# Patient Record
Sex: MALE | Race: BLACK OR AFRICAN AMERICAN | NOT HISPANIC OR LATINO | URBAN - METROPOLITAN AREA
[De-identification: names, ages, dates, MRNs, and addresses within clinical notes are randomized per-mention and may not be internally consistent; named-entity substitution may affect disease eponyms.]

---

## 2021-06-06 ENCOUNTER — EMERGENCY (EMERGENCY)
Facility: HOSPITAL | Age: 51
LOS: 1 days | Discharge: HOME | End: 2021-06-06
Attending: EMERGENCY MEDICINE | Admitting: EMERGENCY MEDICINE
Payer: COMMERCIAL

## 2021-06-06 VITALS
HEIGHT: 67 IN | OXYGEN SATURATION: 100 % | SYSTOLIC BLOOD PRESSURE: 124 MMHG | DIASTOLIC BLOOD PRESSURE: 85 MMHG | RESPIRATION RATE: 18 BRPM | HEART RATE: 105 BPM | TEMPERATURE: 99 F

## 2021-06-06 VITALS
RESPIRATION RATE: 17 BRPM | TEMPERATURE: 98 F | HEART RATE: 92 BPM | OXYGEN SATURATION: 100 % | DIASTOLIC BLOOD PRESSURE: 84 MMHG | SYSTOLIC BLOOD PRESSURE: 122 MMHG

## 2021-06-06 LAB
ALBUMIN SERPL ELPH-MCNC: 4 G/DL — SIGNIFICANT CHANGE UP (ref 3.3–5)
ALP SERPL-CCNC: 89 U/L — SIGNIFICANT CHANGE UP (ref 40–120)
ALT FLD-CCNC: 16 U/L — SIGNIFICANT CHANGE UP (ref 4–41)
ANION GAP SERPL CALC-SCNC: 11 MMOL/L — SIGNIFICANT CHANGE UP (ref 7–14)
AST SERPL-CCNC: 11 U/L — SIGNIFICANT CHANGE UP (ref 4–40)
BASOPHILS # BLD AUTO: 0.04 K/UL — SIGNIFICANT CHANGE UP (ref 0–0.2)
BASOPHILS NFR BLD AUTO: 0.4 % — SIGNIFICANT CHANGE UP (ref 0–2)
BILIRUB SERPL-MCNC: 0.8 MG/DL — SIGNIFICANT CHANGE UP (ref 0.2–1.2)
BLOOD GAS VENOUS COMPREHENSIVE RESULT: SIGNIFICANT CHANGE UP
BUN SERPL-MCNC: 15 MG/DL — SIGNIFICANT CHANGE UP (ref 7–23)
CALCIUM SERPL-MCNC: 9 MG/DL — SIGNIFICANT CHANGE UP (ref 8.4–10.5)
CHLORIDE SERPL-SCNC: 101 MMOL/L — SIGNIFICANT CHANGE UP (ref 98–107)
CO2 SERPL-SCNC: 26 MMOL/L — SIGNIFICANT CHANGE UP (ref 22–31)
CREAT SERPL-MCNC: 0.91 MG/DL — SIGNIFICANT CHANGE UP (ref 0.5–1.3)
EOSINOPHIL # BLD AUTO: 0.04 K/UL — SIGNIFICANT CHANGE UP (ref 0–0.5)
EOSINOPHIL NFR BLD AUTO: 0.4 % — SIGNIFICANT CHANGE UP (ref 0–6)
GLUCOSE SERPL-MCNC: 175 MG/DL — HIGH (ref 70–99)
HCT VFR BLD CALC: 40.1 % — SIGNIFICANT CHANGE UP (ref 39–50)
HGB BLD-MCNC: 12.7 G/DL — LOW (ref 13–17)
IANC: 6.15 K/UL — SIGNIFICANT CHANGE UP (ref 1.5–8.5)
IMM GRANULOCYTES NFR BLD AUTO: 0.3 % — SIGNIFICANT CHANGE UP (ref 0–1.5)
LYMPHOCYTES # BLD AUTO: 2.55 K/UL — SIGNIFICANT CHANGE UP (ref 1–3.3)
LYMPHOCYTES # BLD AUTO: 26.6 % — SIGNIFICANT CHANGE UP (ref 13–44)
MCHC RBC-ENTMCNC: 28 PG — SIGNIFICANT CHANGE UP (ref 27–34)
MCHC RBC-ENTMCNC: 31.7 GM/DL — LOW (ref 32–36)
MCV RBC AUTO: 88.3 FL — SIGNIFICANT CHANGE UP (ref 80–100)
MONOCYTES # BLD AUTO: 0.79 K/UL — SIGNIFICANT CHANGE UP (ref 0–0.9)
MONOCYTES NFR BLD AUTO: 8.2 % — SIGNIFICANT CHANGE UP (ref 2–14)
NEUTROPHILS # BLD AUTO: 6.15 K/UL — SIGNIFICANT CHANGE UP (ref 1.8–7.4)
NEUTROPHILS NFR BLD AUTO: 64.1 % — SIGNIFICANT CHANGE UP (ref 43–77)
NRBC # BLD: 0 /100 WBCS — SIGNIFICANT CHANGE UP
NRBC # FLD: 0 K/UL — SIGNIFICANT CHANGE UP
PLATELET # BLD AUTO: 206 K/UL — SIGNIFICANT CHANGE UP (ref 150–400)
POTASSIUM SERPL-MCNC: 3.9 MMOL/L — SIGNIFICANT CHANGE UP (ref 3.5–5.3)
POTASSIUM SERPL-SCNC: 3.9 MMOL/L — SIGNIFICANT CHANGE UP (ref 3.5–5.3)
PROT SERPL-MCNC: 7.6 G/DL — SIGNIFICANT CHANGE UP (ref 6–8.3)
RBC # BLD: 4.54 M/UL — SIGNIFICANT CHANGE UP (ref 4.2–5.8)
RBC # FLD: 12 % — SIGNIFICANT CHANGE UP (ref 10.3–14.5)
SODIUM SERPL-SCNC: 138 MMOL/L — SIGNIFICANT CHANGE UP (ref 135–145)
WBC # BLD: 9.6 K/UL — SIGNIFICANT CHANGE UP (ref 3.8–10.5)
WBC # FLD AUTO: 9.6 K/UL — SIGNIFICANT CHANGE UP (ref 3.8–10.5)

## 2021-06-06 PROCEDURE — 73630 X-RAY EXAM OF FOOT: CPT | Mod: 26,LT

## 2021-06-06 PROCEDURE — 99284 EMERGENCY DEPT VISIT MOD MDM: CPT

## 2021-06-06 PROCEDURE — 73610 X-RAY EXAM OF ANKLE: CPT | Mod: 26,LT

## 2021-06-06 RX ADMIN — Medication 100 MILLIGRAM(S): at 17:28

## 2021-06-06 NOTE — ED ADULT NURSE NOTE - OBJECTIVE STATEMENT
Pt AOX4 c/o wound to dorsal aspect of Left foot; 4x4 cm round open wound noted, wound bed is red, granulation noted; no pus noted; pt denies pain; states he had a pedicure 2 days ago and yesterday a blister appeared and grew and when it popped today the wound became visible.

## 2021-06-06 NOTE — ED PROVIDER NOTE - NSFOLLOWUPINSTRUCTIONS_ED_ALL_ED_FT
Follow up with your primary care provider within 24 hours    Follow up with Podiatry and Dermatology- referral list given      Worsening, continued or new concerning symptoms, fever, redness, discharge, return to the emergency department.

## 2021-06-06 NOTE — ED PROVIDER NOTE - SKIN WOUND TYPE
small blister noted on anterior ankle. large 1st degree ulcer on dorsal foot, no discharge, no erythema, no streaking, no crepitus. left foot with mild edema

## 2021-06-06 NOTE — ED PROVIDER NOTE - PATIENT PORTAL LINK FT
You can access the FollowMyHealth Patient Portal offered by [facility] by registering at the following website: [email or web address]. By joining [employer]’s FollowMyHealth portal, you will also be able to view your health information using other applications (apps) compatible with our system.

## 2021-06-06 NOTE — ED ADULT TRIAGE NOTE - CCCP TRG CHIEF CMPLNT
left foot wound had a pedicure and afterwards pt had blistering next morning pt denies any injury/wound check

## 2021-06-06 NOTE — ED PROVIDER NOTE - CLINICAL SUMMARY MEDICAL DECISION MAKING FREE TEXT BOX
52 y/o male with pmhx of DM not on medication making lifestyle changes, presents to ED c/o left foot blistering x 1 week. Pt states he got a pedicure 1 week ago, and next day developed two blisters on top of his foot and ankle. Pt states a large blister on his foot formed and popped on its own. Pt states he has been keeping the wound clean. Pt denies any pain or history of neuropathy. Notes some mild foot swelling. No weakness, numbness, tingling, calf pain, fevers, chills, redness. on exam, small blister noted on anterior ankle. large 1st degree ulcer on dorsal foot, no discharge, no erythema, no streaking, no crepitus. left foot with mild edema. given swelling and diabetes hx will treat for possible cellulitis with doxycycline, check labs, foot and ankle XR. will refer to podiatry and dermatology outpatient

## 2021-06-06 NOTE — ED PROVIDER NOTE - PROGRESS NOTE DETAILS
PA Gerasimou- pt afebrile. no wbc count. pt amenable for dc home on doxycyline JONA Salehu- pt afebrile. no wbc count. pt amenable for dc home on doxycyline. placed wound dressing with bacitracin

## 2021-06-06 NOTE — ED PROVIDER NOTE - ATTENDING CONTRIBUTION TO CARE
51M had pedicure 1 week ago, then L foot 2 large blisters, one large on top of foot then one on ankle.  Yellow fluid.  Does not appear infected, no foul discharge.  Foot is swollen.  No fever or chills.  Oral temp 99 mild tachycardia.  Plan labs xray.  Does not have pain.    Has good sensation and pulses.  Unusual presentation but pt does not appear acutely ill.  Given uncontrolled DM would check labs.  Given swelling would check xray r/o FB.  Unclear source of blistering, given overall benign appearance and no crepitus or rapid progression do not suspect nec fasc.  However given swelling and DM would rx course of abx in case of occult infection.  Pt should f/u with derm, rx abx and metformin given glc of 175 on labs.  Pt advised to stay out of the sun while on doxy.  VS:  unremarkable except LG temp, mild tachycardia improving    GEN - NAD;   well appearing;   A+O x3   HEAD - NC/AT     ENT - PEERL, EOMI, mucous membranes    moist , no discharge      NECK: Neck supple, non-tender without lymphadenopathy, no masses, no JVD  PULM - CTA b/l,  symmetric breath sounds  COR -  normal heart sounds    ABD - , ND, NT, soft,  BACK - no CVA tenderness, nontender spine     EXTREMS - no edema, no deformity, warm and well perfused  except mild swelling of foot diffusely.   SKIN - no rash    or bruising    Except large ulceration (deroofed blister) to dorsum of L foot.  Base of ulcer has good granulation tissue and no purulence or crepitus.  Rim of ulcer is clean. Scattered tiny ulcers to anterior aspect of ankle, nonpurulent.  Minimal ttp of foot.   NEUROLOGIC - alert, face symmetric, speech fluent, sensation nl, motor no focal deficit.

## 2021-06-06 NOTE — ED ADULT NURSE NOTE - NSIMPLEMENTINTERV_GEN_ALL_ED
Implemented All Universal Safety Interventions:  [place] to call system. Call bell, personal items and telephone within reach. Instruct patient to call for assistance. Room bathroom lighting operational. Non-slip footwear when patient is off stretcher. Physically safe environment: no spills, clutter or unnecessary equipment. Stretcher in lowest position, wheels locked, appropriate side rails in place.

## 2021-06-06 NOTE — ED PROVIDER NOTE - OBJECTIVE STATEMENT
50 y/o male with pmhx of DM not on medication making lifestyle changes, presents to ED c/o left foot blistering x 1 week. Pt states he got a pedicure 1 week ago, and next day developed two blisters on top of his foot and ankle. Pt states a large blister on his foot formed and popped on its own. Pt states he has been keeping the wound clean. Pt denies any pain or history of neuropathy. Notes some mild foot swelling. No weakness, numbness, tingling, calf pain, fevers, chills, redness.

## 2021-06-07 RX ORDER — METFORMIN HYDROCHLORIDE 850 MG/1
1 TABLET ORAL
Qty: 60 | Refills: 0
Start: 2021-06-07 | End: 2021-07-06

## 2021-06-09 PROBLEM — Z00.00 ENCOUNTER FOR PREVENTIVE HEALTH EXAMINATION: Status: ACTIVE | Noted: 2021-06-09

## 2021-06-11 ENCOUNTER — APPOINTMENT (OUTPATIENT)
Dept: WOUND CARE | Facility: CLINIC | Age: 51
End: 2021-06-11
Payer: COMMERCIAL

## 2021-06-11 VITALS
TEMPERATURE: 96.8 F | HEART RATE: 90 BPM | RESPIRATION RATE: 16 BRPM | DIASTOLIC BLOOD PRESSURE: 95 MMHG | SYSTOLIC BLOOD PRESSURE: 144 MMHG

## 2021-06-11 DIAGNOSIS — E10.49 TYPE 1 DIABETES MELLITUS WITH OTHER DIABETIC NEUROLOGICAL COMPLICATION: ICD-10-CM

## 2021-06-11 DIAGNOSIS — L97.422 NON-PRESSURE CHRONIC ULCER OF LEFT HEEL AND MIDFOOT WITH FAT LAYER EXPOSED: ICD-10-CM

## 2021-06-11 PROBLEM — E11.9 TYPE 2 DIABETES MELLITUS WITHOUT COMPLICATIONS: Chronic | Status: ACTIVE | Noted: 2021-06-06

## 2021-06-11 PROCEDURE — 99203 OFFICE O/P NEW LOW 30 MIN: CPT | Mod: 25

## 2021-06-11 PROCEDURE — 99072 ADDL SUPL MATRL&STAF TM PHE: CPT

## 2021-06-11 PROCEDURE — 11042 DBRDMT SUBQ TIS 1ST 20SQCM/<: CPT

## 2021-06-11 RX ORDER — MUPIROCIN 20 MG/G
2 OINTMENT TOPICAL
Qty: 1 | Refills: 2 | Status: ACTIVE | COMMUNITY
Start: 2021-06-11 | End: 1900-01-01

## 2021-06-11 NOTE — PLAN
[FreeTextEntry1] : Full thickness debridement of ulceration left foot with sterile # 10 blade down tot he level of the subcutaneous tissue but not past, removal of fibrotic tissue dorsal left foot\par Rx mupirocin ointment with gauze and oswaldo\par recommend patient use surgical shoe left foot\par recommend patient stay off work for 2 weeks\par no need for oral antibiosis\par return 2 weeks or PRN

## 2021-06-11 NOTE — HISTORY OF PRESENT ILLNESS
[FreeTextEntry1] : 51 years old male [presents to wound center for evaluation of ulcer dorsal left foot\par patietn went to Pedicure 2 weeks ago and noticed blisters left foot. superficial wound dorsal 2,3,4 metatarsals with no signs of drainage no edema no ertythema and no cellulitis\par DP and PT palapble pedal pulses\par Patient has diabetic neuropathy with absent protective threshold bothf eet\par Possible burn, rule out blister\par no need for oral antibiosis\par

## 2021-06-25 ENCOUNTER — APPOINTMENT (OUTPATIENT)
Dept: WOUND CARE | Facility: CLINIC | Age: 51
End: 2021-06-25

## 2021-07-07 ENCOUNTER — APPOINTMENT (OUTPATIENT)
Dept: DERMATOLOGY | Facility: CLINIC | Age: 51
End: 2021-07-07

## 2022-08-29 ENCOUNTER — EMERGENCY (EMERGENCY)
Facility: HOSPITAL | Age: 52
LOS: 1 days | Discharge: HOME | End: 2022-08-29
Attending: STUDENT IN AN ORGANIZED HEALTH CARE EDUCATION/TRAINING PROGRAM | Admitting: STUDENT IN AN ORGANIZED HEALTH CARE EDUCATION/TRAINING PROGRAM

## 2022-08-29 VITALS
RESPIRATION RATE: 18 BRPM | SYSTOLIC BLOOD PRESSURE: 109 MMHG | HEART RATE: 119 BPM | OXYGEN SATURATION: 98 % | DIASTOLIC BLOOD PRESSURE: 68 MMHG | TEMPERATURE: 98 F | HEIGHT: 67 IN

## 2022-08-29 VITALS
RESPIRATION RATE: 19 BRPM | SYSTOLIC BLOOD PRESSURE: 153 MMHG | DIASTOLIC BLOOD PRESSURE: 90 MMHG | TEMPERATURE: 98 F | OXYGEN SATURATION: 100 % | HEART RATE: 92 BPM

## 2022-08-29 LAB
ALBUMIN SERPL ELPH-MCNC: 3.7 G/DL — SIGNIFICANT CHANGE UP (ref 3.3–5)
ALP SERPL-CCNC: 73 U/L — SIGNIFICANT CHANGE UP (ref 40–120)
ALT FLD-CCNC: 21 U/L — SIGNIFICANT CHANGE UP (ref 4–41)
ANION GAP SERPL CALC-SCNC: 10 MMOL/L — SIGNIFICANT CHANGE UP (ref 7–14)
AST SERPL-CCNC: 27 U/L — SIGNIFICANT CHANGE UP (ref 4–40)
BASOPHILS # BLD AUTO: 0.04 K/UL — SIGNIFICANT CHANGE UP (ref 0–0.2)
BASOPHILS NFR BLD AUTO: 0.6 % — SIGNIFICANT CHANGE UP (ref 0–2)
BILIRUB SERPL-MCNC: 2 MG/DL — HIGH (ref 0.2–1.2)
BLOOD GAS VENOUS COMPREHENSIVE RESULT: SIGNIFICANT CHANGE UP
BUN SERPL-MCNC: 13 MG/DL — SIGNIFICANT CHANGE UP (ref 7–23)
CALCIUM SERPL-MCNC: 8.3 MG/DL — LOW (ref 8.4–10.5)
CHLORIDE SERPL-SCNC: 92 MMOL/L — LOW (ref 98–107)
CO2 SERPL-SCNC: 25 MMOL/L — SIGNIFICANT CHANGE UP (ref 22–31)
CREAT SERPL-MCNC: 1.02 MG/DL — SIGNIFICANT CHANGE UP (ref 0.5–1.3)
EGFR: 88 ML/MIN/1.73M2 — SIGNIFICANT CHANGE UP
EOSINOPHIL # BLD AUTO: 0 K/UL — SIGNIFICANT CHANGE UP (ref 0–0.5)
EOSINOPHIL NFR BLD AUTO: 0 % — SIGNIFICANT CHANGE UP (ref 0–6)
GLUCOSE SERPL-MCNC: 161 MG/DL — HIGH (ref 70–99)
HCT VFR BLD CALC: 40.1 % — SIGNIFICANT CHANGE UP (ref 39–50)
HGB BLD-MCNC: 13.5 G/DL — SIGNIFICANT CHANGE UP (ref 13–17)
IANC: 4.77 K/UL — SIGNIFICANT CHANGE UP (ref 1.8–7.4)
IMM GRANULOCYTES NFR BLD AUTO: 0.8 % — SIGNIFICANT CHANGE UP (ref 0–1.5)
LIDOCAIN IGE QN: 36 U/L — SIGNIFICANT CHANGE UP (ref 7–60)
LYMPHOCYTES # BLD AUTO: 1.35 K/UL — SIGNIFICANT CHANGE UP (ref 1–3.3)
LYMPHOCYTES # BLD AUTO: 20.3 % — SIGNIFICANT CHANGE UP (ref 13–44)
MAGNESIUM SERPL-MCNC: 1.9 MG/DL — SIGNIFICANT CHANGE UP (ref 1.6–2.6)
MCHC RBC-ENTMCNC: 28.1 PG — SIGNIFICANT CHANGE UP (ref 27–34)
MCHC RBC-ENTMCNC: 33.7 GM/DL — SIGNIFICANT CHANGE UP (ref 32–36)
MCV RBC AUTO: 83.4 FL — SIGNIFICANT CHANGE UP (ref 80–100)
MONOCYTES # BLD AUTO: 0.45 K/UL — SIGNIFICANT CHANGE UP (ref 0–0.9)
MONOCYTES NFR BLD AUTO: 6.8 % — SIGNIFICANT CHANGE UP (ref 2–14)
NEUTROPHILS # BLD AUTO: 4.77 K/UL — SIGNIFICANT CHANGE UP (ref 1.8–7.4)
NEUTROPHILS NFR BLD AUTO: 71.5 % — SIGNIFICANT CHANGE UP (ref 43–77)
NRBC # BLD: 0 /100 WBCS — SIGNIFICANT CHANGE UP (ref 0–0)
NRBC # FLD: 0 K/UL — SIGNIFICANT CHANGE UP (ref 0–0)
PHOSPHATE SERPL-MCNC: 2.6 MG/DL — SIGNIFICANT CHANGE UP (ref 2.5–4.5)
PLATELET # BLD AUTO: 158 K/UL — SIGNIFICANT CHANGE UP (ref 150–400)
POTASSIUM SERPL-MCNC: 3.8 MMOL/L — SIGNIFICANT CHANGE UP (ref 3.5–5.3)
POTASSIUM SERPL-SCNC: 3.8 MMOL/L — SIGNIFICANT CHANGE UP (ref 3.5–5.3)
PROT SERPL-MCNC: 7.5 G/DL — SIGNIFICANT CHANGE UP (ref 6–8.3)
RBC # BLD: 4.81 M/UL — SIGNIFICANT CHANGE UP (ref 4.2–5.8)
RBC # FLD: 12.2 % — SIGNIFICANT CHANGE UP (ref 10.3–14.5)
SODIUM SERPL-SCNC: 127 MMOL/L — LOW (ref 135–145)
TROPONIN T, HIGH SENSITIVITY RESULT: 19 NG/L — SIGNIFICANT CHANGE UP
WBC # BLD: 6.66 K/UL — SIGNIFICANT CHANGE UP (ref 3.8–10.5)
WBC # FLD AUTO: 6.66 K/UL — SIGNIFICANT CHANGE UP (ref 3.8–10.5)

## 2022-08-29 PROCEDURE — 99285 EMERGENCY DEPT VISIT HI MDM: CPT

## 2022-08-29 PROCEDURE — 71045 X-RAY EXAM CHEST 1 VIEW: CPT | Mod: 26

## 2022-08-29 RX ORDER — FAMOTIDINE 10 MG/ML
1 INJECTION INTRAVENOUS
Qty: 20 | Refills: 0
Start: 2022-08-29 | End: 2022-09-07

## 2022-08-29 RX ORDER — SODIUM CHLORIDE 9 MG/ML
1000 INJECTION INTRAMUSCULAR; INTRAVENOUS; SUBCUTANEOUS ONCE
Refills: 0 | Status: COMPLETED | OUTPATIENT
Start: 2022-08-29 | End: 2022-08-29

## 2022-08-29 RX ORDER — FAMOTIDINE 10 MG/ML
20 INJECTION INTRAVENOUS ONCE
Refills: 0 | Status: DISCONTINUED | OUTPATIENT
Start: 2022-08-29 | End: 2022-08-29

## 2022-08-29 RX ORDER — FAMOTIDINE 10 MG/ML
20 INJECTION INTRAVENOUS ONCE
Refills: 0 | Status: COMPLETED | OUTPATIENT
Start: 2022-08-29 | End: 2022-08-29

## 2022-08-29 RX ADMIN — SODIUM CHLORIDE 1000 MILLILITER(S): 9 INJECTION INTRAMUSCULAR; INTRAVENOUS; SUBCUTANEOUS at 14:15

## 2022-08-29 RX ADMIN — FAMOTIDINE 20 MILLIGRAM(S): 10 INJECTION INTRAVENOUS at 16:11

## 2022-08-29 RX ADMIN — Medication 30 MILLILITER(S): at 16:11

## 2022-08-29 NOTE — ED PROVIDER NOTE - NSICDXPASTMEDICALHX_GEN_ALL_CORE_FT
PAST MEDICAL HISTORY:  Diabetes     No Past Medical History Patient has no seen a Dr. in 4 years so he is unaware of [facility]

## 2022-08-29 NOTE — ED ADULT NURSE NOTE - NSICDXPASTMEDICALHX_GEN_ALL_CORE_FT
PAST MEDICAL HISTORY:  Diabetes     No Past Medical History Patient has no seen a Dr. in 4 years so he is unaware of [facility]

## 2022-08-29 NOTE — ED ADULT NURSE NOTE - OBJECTIVE STATEMENT
pt received in rm 16 AAo x 3. pt c/o weakness, intermittent midsternal chest pain and abdominal pain x 3 days. pt denies sob, n/v/d/, fevers, chills. +cough noted. respirations even and unlabored. 20g iv placed to left ac. will continue to monitor.

## 2022-08-29 NOTE — ED PROVIDER NOTE - NS ED ROS FT
GENERAL: No fever, chills. + fatigue.  EYES: no vision changes, no discharge.   ENT: no difficulty swallowing or speaking   CARDIAC: no chest pain/pressure, SOB, lower extremity swelling  PULMONARY: no cough, SOB  GI: + abdominal discomfort, no n/v/d +  appetite  : no dysuria, no hematuria  SKIN: no rashes, no ecchymosis  NEURO: no headache, lightheadedness  MSK: No joint pain, myalgia, weakness.

## 2022-08-29 NOTE — ED ADULT TRIAGE NOTE - CHIEF COMPLAINT QUOTE
Pt with PMH of DM on Metformin, c/o abd pain and generalized weakness x 3 days. Pt states that he can't eat due to pain and lack of appetite, denies nausea. Denies chest pain, denies SOB.

## 2022-08-29 NOTE — ED PROVIDER NOTE - CLINICAL SUMMARY MEDICAL DECISION MAKING FREE TEXT BOX
53 yo M hx of DM on metformin, pw 3 days periumbilical abd discomfort, loss of appetite and fatigue. non tender abdomen. Pt states pain similar to when he was initially dx with DM. , ddx: electrolyte disturbance, PNA, UTI, lower suspicion for acs, but will obtain cardiac workup. Reassess for need for further labs or imaging.

## 2022-08-29 NOTE — ED PROVIDER NOTE - OBJECTIVE STATEMENT
53 yo M hx of DM on metformin, pw 3 days periumbilical abd discomfort, loss of appetite and fatigue. Abd discomfort is fullness, "moves around" sometimes radiates to epigastrium/lower sternal chest, no sob, fever chills, vomiting diarrhea, dysuria, urinary frequency, hematuria.

## 2022-08-29 NOTE — ED PROVIDER NOTE - PATIENT PORTAL LINK FT
You can access the FollowMyHealth Patient Portal offered by [facility] by registering at the following website: [email or web address]. By joining [employer]’s FollowMyHealth portal, you will also be able to view your health information using other applications (apps) compatible with our system.

## 2022-08-29 NOTE — ED PROVIDER NOTE - NSFOLLOWUPINSTRUCTIONS_ED_ALL_ED_FT
** You are prescribed pepcid. Take as directed by your pharmacists.   ** Take over the counter maalox for your pain -- follow dosing directions on original bottle.    ** Follow up with your primary care doctor in the next 72 hours.   ** A rapid follow up appointment has been requested for gastroenterology. The coordinator will call you with an appointment time.   ** Go to the nearest Emergency Department if you experience any new or concerning symptoms, such as:   - chest pain  - difficulty breathing  - passing out  - unable to eat or drink due to pain.   - unable to move or feel part of your body  - fever, chills

## 2022-08-29 NOTE — ED PROVIDER NOTE - PROGRESS NOTE DETAILS
Angelic Mendoza M.D. Tox Fellow  Pt reassessed, non tender abdomen, feels better after maalox. agreeable to oupt GI followup. Pt tolerated PO in the ED. Angelic Mendoza M.D. Tox Fellow  Pt reassessed, non tender abdomen, feels better after maalox. agreeable to oupt GI followup. Pt endorse he is tolerting PO at home.

## 2022-08-29 NOTE — ED PROVIDER NOTE - ATTENDING CONTRIBUTION TO CARE
53 yo M hx of DM on metformin, pw 3 days of generalized weakness and "stomach weakness" , loss of appetite and fatigue. Abd discomfort is fullness, "moves around" sometimes radiates to epigastrium/lower sternal chest, no sob, fever chills, vomiting diarrhea, dysuria, urinary frequency, hematuria.n ofevers, no cough, no uri sx  pt well appearing, initialyl tachy and now rate in 90s  abd nontender, lungs clear, neuro intact  will check basic labs, xzr, ua, trop, fluids, reassess

## 2025-06-09 ENCOUNTER — INPATIENT (INPATIENT)
Facility: HOSPITAL | Age: 55
LOS: 9 days | Discharge: HOME HEALTH | End: 2025-06-19
Attending: HOSPITALIST | Admitting: HOSPITALIST
Payer: MEDICAID

## 2025-06-09 VITALS
DIASTOLIC BLOOD PRESSURE: 100 MMHG | HEART RATE: 126 BPM | OXYGEN SATURATION: 100 % | TEMPERATURE: 98 F | RESPIRATION RATE: 22 BRPM | SYSTOLIC BLOOD PRESSURE: 176 MMHG

## 2025-06-09 DIAGNOSIS — R33.9 RETENTION OF URINE, UNSPECIFIED: ICD-10-CM

## 2025-06-09 DIAGNOSIS — I10 ESSENTIAL (PRIMARY) HYPERTENSION: ICD-10-CM

## 2025-06-09 DIAGNOSIS — A41.9 SEPSIS, UNSPECIFIED ORGANISM: ICD-10-CM

## 2025-06-09 DIAGNOSIS — Z29.9 ENCOUNTER FOR PROPHYLACTIC MEASURES, UNSPECIFIED: ICD-10-CM

## 2025-06-09 DIAGNOSIS — E11.65 TYPE 2 DIABETES MELLITUS WITH HYPERGLYCEMIA: ICD-10-CM

## 2025-06-09 LAB
ADD ON TEST-SPECIMEN IN LAB: SIGNIFICANT CHANGE UP
ADD ON TEST-SPECIMEN IN LAB: SIGNIFICANT CHANGE UP
ALBUMIN SERPL ELPH-MCNC: 3.2 G/DL — LOW (ref 3.3–5)
ALBUMIN SERPL ELPH-MCNC: 3.6 G/DL — SIGNIFICANT CHANGE UP (ref 3.3–5)
ALP SERPL-CCNC: 79 U/L — SIGNIFICANT CHANGE UP (ref 40–120)
ALP SERPL-CCNC: 89 U/L — SIGNIFICANT CHANGE UP (ref 40–120)
ALT FLD-CCNC: 8 U/L — SIGNIFICANT CHANGE UP (ref 4–41)
ALT FLD-CCNC: 9 U/L — SIGNIFICANT CHANGE UP (ref 4–41)
ANION GAP SERPL CALC-SCNC: 15 MMOL/L — HIGH (ref 7–14)
ANION GAP SERPL CALC-SCNC: 20 MMOL/L — HIGH (ref 7–14)
APPEARANCE UR: ABNORMAL
AST SERPL-CCNC: 10 U/L — SIGNIFICANT CHANGE UP (ref 4–40)
AST SERPL-CCNC: 13 U/L — SIGNIFICANT CHANGE UP (ref 4–40)
B-OH-BUTYR SERPL-SCNC: <0 MMOL/L — SIGNIFICANT CHANGE UP (ref 0–0.4)
BACTERIA # UR AUTO: ABNORMAL /HPF
BASOPHILS # BLD AUTO: 0.06 K/UL — SIGNIFICANT CHANGE UP (ref 0–0.2)
BASOPHILS # BLD AUTO: 0.07 K/UL — SIGNIFICANT CHANGE UP (ref 0–0.2)
BASOPHILS NFR BLD AUTO: 0.3 % — SIGNIFICANT CHANGE UP (ref 0–2)
BASOPHILS NFR BLD AUTO: 0.5 % — SIGNIFICANT CHANGE UP (ref 0–2)
BILIRUB SERPL-MCNC: 0.4 MG/DL — SIGNIFICANT CHANGE UP (ref 0.2–1.2)
BILIRUB SERPL-MCNC: 0.6 MG/DL — SIGNIFICANT CHANGE UP (ref 0.2–1.2)
BILIRUB UR-MCNC: NEGATIVE — SIGNIFICANT CHANGE UP
BLOOD GAS VENOUS COMPREHENSIVE RESULT: SIGNIFICANT CHANGE UP
BLOOD GAS VENOUS COMPREHENSIVE RESULT: SIGNIFICANT CHANGE UP
BUN SERPL-MCNC: 14 MG/DL — SIGNIFICANT CHANGE UP (ref 7–23)
BUN SERPL-MCNC: 19 MG/DL — SIGNIFICANT CHANGE UP (ref 7–23)
CALCIUM SERPL-MCNC: 9.1 MG/DL — SIGNIFICANT CHANGE UP (ref 8.4–10.5)
CALCIUM SERPL-MCNC: 9.3 MG/DL — SIGNIFICANT CHANGE UP (ref 8.4–10.5)
CAST: 0 /LPF — SIGNIFICANT CHANGE UP (ref 0–4)
CHLORIDE SERPL-SCNC: 101 MMOL/L — SIGNIFICANT CHANGE UP (ref 98–107)
CHLORIDE SERPL-SCNC: 90 MMOL/L — LOW (ref 98–107)
CO2 SERPL-SCNC: 19 MMOL/L — LOW (ref 22–31)
CO2 SERPL-SCNC: 23 MMOL/L — SIGNIFICANT CHANGE UP (ref 22–31)
COLOR SPEC: YELLOW — SIGNIFICANT CHANGE UP
CREAT SERPL-MCNC: 0.89 MG/DL — SIGNIFICANT CHANGE UP (ref 0.5–1.3)
CREAT SERPL-MCNC: 1.16 MG/DL — SIGNIFICANT CHANGE UP (ref 0.5–1.3)
DIFF PNL FLD: ABNORMAL
EGFR: 101 ML/MIN/1.73M2 — SIGNIFICANT CHANGE UP
EGFR: 101 ML/MIN/1.73M2 — SIGNIFICANT CHANGE UP
EGFR: 74 ML/MIN/1.73M2 — SIGNIFICANT CHANGE UP
EGFR: 74 ML/MIN/1.73M2 — SIGNIFICANT CHANGE UP
EOSINOPHIL # BLD AUTO: 0.01 K/UL — SIGNIFICANT CHANGE UP (ref 0–0.5)
EOSINOPHIL # BLD AUTO: 0.07 K/UL — SIGNIFICANT CHANGE UP (ref 0–0.5)
EOSINOPHIL NFR BLD AUTO: 0.1 % — SIGNIFICANT CHANGE UP (ref 0–6)
EOSINOPHIL NFR BLD AUTO: 0.5 % — SIGNIFICANT CHANGE UP (ref 0–6)
GLUCOSE BLDC GLUCOMTR-MCNC: 107 MG/DL — HIGH (ref 70–99)
GLUCOSE BLDC GLUCOMTR-MCNC: 170 MG/DL — HIGH (ref 70–99)
GLUCOSE BLDC GLUCOMTR-MCNC: 223 MG/DL — HIGH (ref 70–99)
GLUCOSE BLDC GLUCOMTR-MCNC: 70 MG/DL — SIGNIFICANT CHANGE UP (ref 70–99)
GLUCOSE SERPL-MCNC: 178 MG/DL — HIGH (ref 70–99)
GLUCOSE SERPL-MCNC: 685 MG/DL — CRITICAL HIGH (ref 70–99)
GLUCOSE UR QL: >=1000 MG/DL
HCT VFR BLD CALC: 35.8 % — LOW (ref 39–50)
HCT VFR BLD CALC: 36.9 % — LOW (ref 39–50)
HGB BLD-MCNC: 12.2 G/DL — LOW (ref 13–17)
HGB BLD-MCNC: 12.5 G/DL — LOW (ref 13–17)
IANC: 11.17 K/UL — HIGH (ref 1.8–7.4)
IANC: 13.66 K/UL — HIGH (ref 1.8–7.4)
IMM GRANULOCYTES NFR BLD AUTO: 0.5 % — SIGNIFICANT CHANGE UP (ref 0–0.9)
IMM GRANULOCYTES NFR BLD AUTO: 0.6 % — SIGNIFICANT CHANGE UP (ref 0–0.9)
KETONES UR QL: NEGATIVE MG/DL — SIGNIFICANT CHANGE UP
LEUKOCYTE ESTERASE UR-ACNC: ABNORMAL
LYMPHOCYTES # BLD AUTO: 15.6 % — SIGNIFICANT CHANGE UP (ref 13–44)
LYMPHOCYTES # BLD AUTO: 19.5 % — SIGNIFICANT CHANGE UP (ref 13–44)
LYMPHOCYTES # BLD AUTO: 2.75 K/UL — SIGNIFICANT CHANGE UP (ref 1–3.3)
LYMPHOCYTES # BLD AUTO: 2.95 K/UL — SIGNIFICANT CHANGE UP (ref 1–3.3)
MAGNESIUM SERPL-MCNC: 2.2 MG/DL — SIGNIFICANT CHANGE UP (ref 1.6–2.6)
MCHC RBC-ENTMCNC: 28 PG — SIGNIFICANT CHANGE UP (ref 27–34)
MCHC RBC-ENTMCNC: 28.3 PG — SIGNIFICANT CHANGE UP (ref 27–34)
MCHC RBC-ENTMCNC: 33.9 G/DL — SIGNIFICANT CHANGE UP (ref 32–36)
MCHC RBC-ENTMCNC: 34.1 G/DL — SIGNIFICANT CHANGE UP (ref 32–36)
MCV RBC AUTO: 82.1 FL — SIGNIFICANT CHANGE UP (ref 80–100)
MCV RBC AUTO: 83.5 FL — SIGNIFICANT CHANGE UP (ref 80–100)
MONOCYTES # BLD AUTO: 0.76 K/UL — SIGNIFICANT CHANGE UP (ref 0–0.9)
MONOCYTES # BLD AUTO: 1.01 K/UL — HIGH (ref 0–0.9)
MONOCYTES NFR BLD AUTO: 5 % — SIGNIFICANT CHANGE UP (ref 2–14)
MONOCYTES NFR BLD AUTO: 5.7 % — SIGNIFICANT CHANGE UP (ref 2–14)
NEUTROPHILS # BLD AUTO: 11.17 K/UL — HIGH (ref 1.8–7.4)
NEUTROPHILS # BLD AUTO: 13.66 K/UL — HIGH (ref 1.8–7.4)
NEUTROPHILS NFR BLD AUTO: 74 % — SIGNIFICANT CHANGE UP (ref 43–77)
NEUTROPHILS NFR BLD AUTO: 77.7 % — HIGH (ref 43–77)
NITRITE UR-MCNC: NEGATIVE — SIGNIFICANT CHANGE UP
NRBC # BLD AUTO: 0 K/UL — SIGNIFICANT CHANGE UP (ref 0–0)
NRBC # BLD AUTO: 0 K/UL — SIGNIFICANT CHANGE UP (ref 0–0)
NRBC # FLD: 0 K/UL — SIGNIFICANT CHANGE UP (ref 0–0)
NRBC # FLD: 0 K/UL — SIGNIFICANT CHANGE UP (ref 0–0)
NRBC BLD AUTO-RTO: 0 /100 WBCS — SIGNIFICANT CHANGE UP (ref 0–0)
NRBC BLD AUTO-RTO: 0 /100 WBCS — SIGNIFICANT CHANGE UP (ref 0–0)
PH UR: 6.5 — SIGNIFICANT CHANGE UP (ref 5–8)
PHOSPHATE SERPL-MCNC: 3.3 MG/DL — SIGNIFICANT CHANGE UP (ref 2.5–4.5)
PLATELET # BLD AUTO: 305 K/UL — SIGNIFICANT CHANGE UP (ref 150–400)
PLATELET # BLD AUTO: 321 K/UL — SIGNIFICANT CHANGE UP (ref 150–400)
POTASSIUM SERPL-MCNC: 3.6 MMOL/L — SIGNIFICANT CHANGE UP (ref 3.5–5.3)
POTASSIUM SERPL-MCNC: 3.8 MMOL/L — SIGNIFICANT CHANGE UP (ref 3.5–5.3)
POTASSIUM SERPL-SCNC: 3.6 MMOL/L — SIGNIFICANT CHANGE UP (ref 3.5–5.3)
POTASSIUM SERPL-SCNC: 3.8 MMOL/L — SIGNIFICANT CHANGE UP (ref 3.5–5.3)
PROT SERPL-MCNC: 7.5 G/DL — SIGNIFICANT CHANGE UP (ref 6–8.3)
PROT SERPL-MCNC: 8.2 G/DL — SIGNIFICANT CHANGE UP (ref 6–8.3)
PROT UR-MCNC: 100 MG/DL
RBC # BLD: 4.36 M/UL — SIGNIFICANT CHANGE UP (ref 4.2–5.8)
RBC # BLD: 4.42 M/UL — SIGNIFICANT CHANGE UP (ref 4.2–5.8)
RBC # FLD: 11.8 % — SIGNIFICANT CHANGE UP (ref 10.3–14.5)
RBC # FLD: 11.9 % — SIGNIFICANT CHANGE UP (ref 10.3–14.5)
RBC CASTS # UR COMP ASSIST: 157 /HPF — HIGH (ref 0–4)
SODIUM SERPL-SCNC: 129 MMOL/L — LOW (ref 135–145)
SODIUM SERPL-SCNC: 139 MMOL/L — SIGNIFICANT CHANGE UP (ref 135–145)
SP GR SPEC: 1.02 — SIGNIFICANT CHANGE UP (ref 1–1.03)
SQUAMOUS # UR AUTO: 1 /HPF — SIGNIFICANT CHANGE UP (ref 0–5)
UROBILINOGEN FLD QL: 1 MG/DL — SIGNIFICANT CHANGE UP (ref 0.2–1)
WBC # BLD: 15.1 K/UL — HIGH (ref 3.8–10.5)
WBC # BLD: 17.59 K/UL — HIGH (ref 3.8–10.5)
WBC # FLD AUTO: 15.1 K/UL — HIGH (ref 3.8–10.5)
WBC # FLD AUTO: 17.59 K/UL — HIGH (ref 3.8–10.5)
WBC UR QL: 4389 /HPF — HIGH (ref 0–5)

## 2025-06-09 PROCEDURE — 99254 IP/OBS CNSLTJ NEW/EST MOD 60: CPT

## 2025-06-09 PROCEDURE — 99285 EMERGENCY DEPT VISIT HI MDM: CPT

## 2025-06-09 PROCEDURE — 99223 1ST HOSP IP/OBS HIGH 75: CPT

## 2025-06-09 RX ORDER — GLUCAGON 3 MG/1
1 POWDER NASAL ONCE
Refills: 0 | Status: DISCONTINUED | OUTPATIENT
Start: 2025-06-09 | End: 2025-06-19

## 2025-06-09 RX ORDER — DEXTROSE 50 % IN WATER 50 %
25 SYRINGE (ML) INTRAVENOUS ONCE
Refills: 0 | Status: DISCONTINUED | OUTPATIENT
Start: 2025-06-09 | End: 2025-06-19

## 2025-06-09 RX ORDER — DEXTROSE 50 % IN WATER 50 %
15 SYRINGE (ML) INTRAVENOUS ONCE
Refills: 0 | Status: DISCONTINUED | OUTPATIENT
Start: 2025-06-09 | End: 2025-06-19

## 2025-06-09 RX ORDER — INSULIN LISPRO 100 U/ML
5 INJECTION, SOLUTION INTRAVENOUS; SUBCUTANEOUS ONCE
Refills: 0 | Status: COMPLETED | OUTPATIENT
Start: 2025-06-09 | End: 2025-06-09

## 2025-06-09 RX ORDER — SODIUM CHLORIDE 9 G/1000ML
1000 INJECTION, SOLUTION INTRAVENOUS
Refills: 0 | Status: DISCONTINUED | OUTPATIENT
Start: 2025-06-09 | End: 2025-06-19

## 2025-06-09 RX ORDER — INSULIN GLARGINE-YFGN 100 [IU]/ML
12 INJECTION, SOLUTION SUBCUTANEOUS ONCE
Refills: 0 | Status: COMPLETED | OUTPATIENT
Start: 2025-06-09 | End: 2025-06-09

## 2025-06-09 RX ORDER — INSULIN LISPRO 100 U/ML
INJECTION, SOLUTION INTRAVENOUS; SUBCUTANEOUS
Refills: 0 | Status: DISCONTINUED | OUTPATIENT
Start: 2025-06-09 | End: 2025-06-19

## 2025-06-09 RX ORDER — INSULIN LISPRO 100 U/ML
4 INJECTION, SOLUTION INTRAVENOUS; SUBCUTANEOUS
Refills: 0 | Status: DISCONTINUED | OUTPATIENT
Start: 2025-06-09 | End: 2025-06-09

## 2025-06-09 RX ORDER — ACETAMINOPHEN 500 MG/5ML
650 LIQUID (ML) ORAL EVERY 6 HOURS
Refills: 0 | Status: DISCONTINUED | OUTPATIENT
Start: 2025-06-09 | End: 2025-06-19

## 2025-06-09 RX ORDER — INSULIN LISPRO 100 U/ML
5 INJECTION, SOLUTION INTRAVENOUS; SUBCUTANEOUS
Refills: 0 | Status: DISCONTINUED | OUTPATIENT
Start: 2025-06-09 | End: 2025-06-11

## 2025-06-09 RX ORDER — CEFTRIAXONE 500 MG/1
1000 INJECTION, POWDER, FOR SOLUTION INTRAMUSCULAR; INTRAVENOUS EVERY 24 HOURS
Refills: 0 | Status: DISCONTINUED | OUTPATIENT
Start: 2025-06-09 | End: 2025-06-12

## 2025-06-09 RX ORDER — ONDANSETRON HCL/PF 4 MG/2 ML
4 VIAL (ML) INJECTION EVERY 8 HOURS
Refills: 0 | Status: DISCONTINUED | OUTPATIENT
Start: 2025-06-09 | End: 2025-06-11

## 2025-06-09 RX ORDER — INSULIN GLARGINE-YFGN 100 [IU]/ML
15 INJECTION, SOLUTION SUBCUTANEOUS
Refills: 0 | Status: DISCONTINUED | OUTPATIENT
Start: 2025-06-10 | End: 2025-06-10

## 2025-06-09 RX ORDER — DEXTROSE 50 % IN WATER 50 %
12.5 SYRINGE (ML) INTRAVENOUS ONCE
Refills: 0 | Status: DISCONTINUED | OUTPATIENT
Start: 2025-06-09 | End: 2025-06-19

## 2025-06-09 RX ORDER — ENALAPRIL MALEATE 20 MG
5 TABLET ORAL DAILY
Refills: 0 | Status: DISCONTINUED | OUTPATIENT
Start: 2025-06-09 | End: 2025-06-11

## 2025-06-09 RX ORDER — MELATONIN 5 MG
3 TABLET ORAL AT BEDTIME
Refills: 0 | Status: DISCONTINUED | OUTPATIENT
Start: 2025-06-09 | End: 2025-06-11

## 2025-06-09 RX ORDER — CEFTRIAXONE 500 MG/1
1000 INJECTION, POWDER, FOR SOLUTION INTRAMUSCULAR; INTRAVENOUS ONCE
Refills: 0 | Status: COMPLETED | OUTPATIENT
Start: 2025-06-09 | End: 2025-06-09

## 2025-06-09 RX ORDER — MAGNESIUM, ALUMINUM HYDROXIDE 200-200 MG
30 TABLET,CHEWABLE ORAL EVERY 4 HOURS
Refills: 0 | Status: DISCONTINUED | OUTPATIENT
Start: 2025-06-09 | End: 2025-06-11

## 2025-06-09 RX ORDER — INSULIN LISPRO 100 U/ML
INJECTION, SOLUTION INTRAVENOUS; SUBCUTANEOUS AT BEDTIME
Refills: 0 | Status: DISCONTINUED | OUTPATIENT
Start: 2025-06-09 | End: 2025-06-19

## 2025-06-09 RX ADMIN — INSULIN LISPRO 5 UNIT(S): 100 INJECTION, SOLUTION INTRAVENOUS; SUBCUTANEOUS at 13:12

## 2025-06-09 RX ADMIN — Medication 1000 MILLILITER(S): at 04:43

## 2025-06-09 RX ADMIN — CEFTRIAXONE 100 MILLIGRAM(S): 500 INJECTION, POWDER, FOR SOLUTION INTRAMUSCULAR; INTRAVENOUS at 04:43

## 2025-06-09 RX ADMIN — INSULIN LISPRO 1: 100 INJECTION, SOLUTION INTRAVENOUS; SUBCUTANEOUS at 17:32

## 2025-06-09 RX ADMIN — INSULIN LISPRO 6: 100 INJECTION, SOLUTION INTRAVENOUS; SUBCUTANEOUS at 07:26

## 2025-06-09 RX ADMIN — INSULIN LISPRO 2: 100 INJECTION, SOLUTION INTRAVENOUS; SUBCUTANEOUS at 13:12

## 2025-06-09 RX ADMIN — INSULIN LISPRO 5 UNIT(S): 100 INJECTION, SOLUTION INTRAVENOUS; SUBCUTANEOUS at 17:33

## 2025-06-09 RX ADMIN — INSULIN GLARGINE-YFGN 12 UNIT(S): 100 INJECTION, SOLUTION SUBCUTANEOUS at 05:58

## 2025-06-09 RX ADMIN — INSULIN LISPRO 5 UNIT(S): 100 INJECTION, SOLUTION INTRAVENOUS; SUBCUTANEOUS at 05:39

## 2025-06-09 RX ADMIN — Medication 1000 MILLILITER(S): at 10:53

## 2025-06-09 NOTE — H&P ADULT - NSHPREVIEWOFSYSTEMS_GEN_ALL_CORE
REVIEW OF SYSTEMS:    CONSTITUTIONAL: No weakness, No fevers, +Diaphoresis and chills  EYES: No visual changes or eye discharge  ENT: No rhinorrhea or sore throat  NECK: No pain or stiffness  RESPIRATORY: No cough, wheezing, hemoptysis; No shortness of breath  CARDIOVASCULAR: No chest pain or palpitations; No lower extremity edema  GASTROINTESTINAL: +Abd pain diffuse, mild. 1 episode of nausea and vomiting, No hematemesis; No diarrhea or constipation. No melena or hematochezia.  BACK: No back pain  GENITOURINARY: +dysuria, frequency, urgency, and initial hematuria, +urinary retention  NEUROLOGICAL: No numbness or weakness  SKIN: No itching, burning, rashes, or lesions

## 2025-06-09 NOTE — H&P ADULT - HISTORY OF PRESENT ILLNESS
This is a 55M with history of DM2 and HTN (not on medications currently for either) who presents to the hospital with complaints of difficulty urinating since last night. Said that for the past 1 week he has been having urinary frequency, urgency, and dysuria. Had some hematuria initially with these symptoms but that resolved soon after it had started. Was also having chills and diaphoresis but no fevers and therefore did not seek medical attention. Said that since last night he would feel the urge to urinate but would only dribble a little and have a sensation of incomplete voiding. This continued for a while and he started to have pelvic pain and discomfort and therefore came to the hospital. Here had a darnell placed with almost immediate relief in his symptoms. Currently states he has some mild abdominal pain and had 1 episode of NBNB emesis in the ED. Also reports having chronic peripheral neuropathy x years of his feet. Otherwise denies any acute complaints.     On arrival to the ED his vitals were T 98.4, P 126, /100, RR22, O2 sat 100% RA. His lab work showed leukocytosis, hyperglycemia to 685 with A1C of 13, and a nl pH. His UA was grossly positive. He was given NS 1L, ceftriaxone 1g, admelog 5U and lantus 12U. He was admitted to medicine.

## 2025-06-09 NOTE — H&P ADULT - PROBLEM SELECTOR PLAN 2
- Likely in setting of UTI, now s/p darnell catheter    -> Would c/w darnell, TOV prior to discharge, if fails then would replace darnell and discharge with outpatient urology f/u

## 2025-06-09 NOTE — PROGRESS NOTE ADULT - SUBJECTIVE AND OBJECTIVE BOX
[facility] Division of Hospital Medicine  Martha Ortiz DO  Available via MS Teams  Pager: [phone]    Patient is a 55y old  Male who presents with a chief complaint of Urinary retention, Sepsis due to UTI, Uncontrolled DM2 (09 Jun 2025 07:14)      SUBJECTIVE / OVERNIGHT EVENTS:    Pt seen and examined this morning. Pt resting in bed stating he's tired and didn't get much sleep last night. Pt states dysuria resolved after IV medications started.     ADDITIONAL REVIEW OF SYSTEMS:  No Fever, chills, chest pain, shortness of breath.     MEDICATIONS  (STANDING):  cefTRIAXone   IVPB 1000 milliGRAM(s) IV Intermittent every 24 hours  dextrose 5%. 1000 milliLiter(s) (100 mL/Hr) IV Continuous <Continuous>  dextrose 5%. 1000 milliLiter(s) (50 mL/Hr) IV Continuous <Continuous>  dextrose 50% Injectable 25 Gram(s) IV Push once  dextrose 50% Injectable 12.5 Gram(s) IV Push once  dextrose 50% Injectable 25 Gram(s) IV Push once  enalapril 5 milliGRAM(s) Oral daily  glucagon  Injectable 1 milliGRAM(s) IntraMuscular once  insulin lispro (ADMELOG) corrective regimen sliding scale   SubCutaneous three times a day before meals  insulin lispro (ADMELOG) corrective regimen sliding scale   SubCutaneous at bedtime  insulin lispro Injectable (ADMELOG) 5 Unit(s) SubCutaneous three times a day before meals    MEDICATIONS  (PRN):  acetaminophen     Tablet .. 650 milliGRAM(s) Oral every 6 hours PRN Temp greater or equal to 38C (100.4F), Mild Pain (1 - 3)  aluminum hydroxide/magnesium hydroxide/simethicone Suspension 30 milliLiter(s) Oral every 4 hours PRN Dyspepsia  dextrose Oral Gel 15 Gram(s) Oral once PRN Blood Glucose LESS THAN 70 milliGRAM(s)/deciliter  melatonin 3 milliGRAM(s) Oral at bedtime PRN Insomnia  ondansetron Injectable 4 milliGRAM(s) IV Push every 8 hours PRN Nausea and/or Vomiting      I&O's Summary      PHYSICAL EXAM:  Vital Signs Last 24 Hrs  T(C): 36.8 (09 Jun 2025 12:37), Max: 37.1 (09 Jun 2025 06:35)  T(F): 98.3 (09 Jun 2025 12:37), Max: 98.8 (09 Jun 2025 06:35)  HR: 90 (09 Jun 2025 12:37) (89 - 126)  BP: 148/85 (09 Jun 2025 12:37) (148/85 - 176/100)  BP(mean): --  RR: 17 (09 Jun 2025 12:37) (17 - 22)  SpO2: 97% (09 Jun 2025 12:37) (97% - 100%)    Parameters below as of 09 Jun 2025 12:37  Patient On (Oxygen Delivery Method): room air      CONSTITUTIONAL: NAD  EYES: PERRLA  ENMT: Moist oral mucosa  RESPIRATORY: Normal respiratory effort; lungs are clear to auscultation bilaterally  CARDIOVASCULAR: Regular rate and rhythm, normal S1 and S2, no murmur/rub/gallop  ABDOMEN: Nontender to palpation, normoactive bowel sounds  PSYCH: A+O to person, place, and time  NEUROLOGY: CN 2-12 are intact and symmetric; no gross sensory deficits     LABS:                        12.2   17.59 )-----------( 305      ( 09 Jun 2025 11:00 )             35.8     06-09    139  |  101  |  14  ----------------------------<  178[H]  3.6   |  23  |  0.89    Ca    9.1      09 Jun 2025 11:00  Phos  3.3     06-09  Mg     2.20     06-09    TPro  7.5  /  Alb  3.2[L]  /  TBili  0.4  /  DBili  x   /  AST  10  /  ALT  8   /  AlkPhos  79  06-09          Urinalysis Basic - ( 09 Jun 2025 11:00 )    Color: x / Appearance: x / SG: x / pH: x  Gluc: 178 mg/dL / Ketone: x  / Bili: x / Urobili: x   Blood: x / Protein: x / Nitrite: x   Leuk Esterase: x / RBC: x / WBC x   Sq Epi: x / Non Sq Epi: x / Bacteria: x

## 2025-06-09 NOTE — CONSULT NOTE ADULT - SUBJECTIVE AND OBJECTIVE BOX
55M with history of DM2 and HTN (not on medications currently for either) who presents to the hospital with complaints of difficulty urinating since last night. Said that for the past 1 week he has been having urinary frequency, urgency, and dysuria. Had some hematuria initially with these symptoms but that resolved soon after it had started. Was also having chills and diaphoresis but no fevers and therefore did not seek medical attention. Said that since last night he would feel the urge to urinate but would only dribble a little and have a sensation of incomplete voiding. This continued for a while and he started to have pelvic pain and discomfort and therefore came to the hospital. Here had a darnell placed with almost immediate relief in his symptoms. Currently states he has some mild abdominal pain and had 1 episode of NBNB emesis in the ED. Also reports having chronic peripheral neuropathy x years of his feet. Otherwise denies any acute complaints. Endocrine consulted for uncontrolled diabetes, A1c 13.    On arrival to the ED his vitals were T 98.4, P 126, /100, RR22, O2 sat 100% RA. His lab work showed leukocytosis, hyperglycemia to 685 with A1C of 13, and a nl pH. His UA was grossly positive. He was given NS 1L, ceftriaxone 1g, admelog 5U and lantus 12U. He was admitted to medicine.  (09 Jun 2025 07:14)    Endocrine history:   DM dx: diagnosed with type 2 diabetes 5 to 6 years ago, thinks his A1c back then was 10  Medications: metformin 500 mg twice daily – stopped 2 to 3 years ago. Patient states that metformin was not working and was not doing anything for his diabetes since he was still hyperglycemic at home.  Fingersticks/CGM: Does not use CGM.  When he was checking his fingerstick, it was usually 200-300s.  He stopped checking his fingerstick a long time ago.   Diet: Patient stated that he tries to control his diabetes with diet.  He eats a lot of vegetables and makes his own juice–papaya.  He eats rice, bread.  Endocrinologist: None  Ophthalmologist evaluation: Last eye exam was 3 to 4 years ago.  Patient currently unemployed.  He has no medical insurance.  He used to work as pest control.  a1c 13    PAST MEDICAL & SURGICAL HISTORY:  Type 2 diabetes mellitus      Essential hypertension      Umbilical hernia          FAMILY HISTORY:  FH: CAD (coronary artery disease) (Father)    Family history of CVA (Father, Sibling, Uncle)    Mother: Diabetes    Social History:  EtOH: Social drinker.  Denies alcohol abuse  Smoking: Denies  Drugs: Denies illicit drug use    Outpatient Medications:    MEDICATIONS  (STANDING):  cefTRIAXone   IVPB 1000 milliGRAM(s) IV Intermittent every 24 hours  dextrose 5%. 1000 milliLiter(s) (100 mL/Hr) IV Continuous <Continuous>  dextrose 5%. 1000 milliLiter(s) (50 mL/Hr) IV Continuous <Continuous>  dextrose 50% Injectable 25 Gram(s) IV Push once  dextrose 50% Injectable 12.5 Gram(s) IV Push once  dextrose 50% Injectable 25 Gram(s) IV Push once  enalapril 5 milliGRAM(s) Oral daily  glucagon  Injectable 1 milliGRAM(s) IntraMuscular once  insulin lispro (ADMELOG) corrective regimen sliding scale   SubCutaneous three times a day before meals  insulin lispro (ADMELOG) corrective regimen sliding scale   SubCutaneous at bedtime  insulin lispro Injectable (ADMELOG) 5 Unit(s) SubCutaneous three times a day before meals    MEDICATIONS  (PRN):  acetaminophen     Tablet .. 650 milliGRAM(s) Oral every 6 hours PRN Temp greater or equal to 38C (100.4F), Mild Pain (1 - 3)  aluminum hydroxide/magnesium hydroxide/simethicone Suspension 30 milliLiter(s) Oral every 4 hours PRN Dyspepsia  dextrose Oral Gel 15 Gram(s) Oral once PRN Blood Glucose LESS THAN 70 milliGRAM(s)/deciliter  melatonin 3 milliGRAM(s) Oral at bedtime PRN Insomnia  ondansetron Injectable 4 milliGRAM(s) IV Push every 8 hours PRN Nausea and/or Vomiting      Allergies    No Known Allergies    Intolerances      Review of Systems:  Constitutional: No fever/chills   Eyes: No blurry vision  Neuro: No tremors  HEENT: No pain  Cardiovascular: No chest pain, no palpitations  Respiratory: No SOB, no cough  GI: No nausea, vomiting or abdominal pain  : No dysuria  Skin: no rash  Endocrine: no polyuria, polydipsia    PHYSICAL EXAM:  VITALS: T(C): 36.8 (06-09-25 @ 12:37)  T(F): 98.3 (06-09-25 @ 12:37), Max: 98.8 (06-09-25 @ 06:35)  HR: 90 (06-09-25 @ 12:37) (89 - 126)  BP: 148/85 (06-09-25 @ 12:37) (148/85 - 176/100)  RR:  (17 - 22)  SpO2:  (97% - 100%)  Wt(kg): --  GENERAL: NAD, well-groomed, well-developed  EYES: No proptosis, no lid lag, anicteric  HEENT:  Atraumatic, Normocephalic, moist mucous membranes  RESPIRATORY: non labored breathing   GI: Soft, nontender, non distended, normal bowel sounds  SKIN: Dry, intact, No rashes or lesions  MUSCULOSKELETAL: Full range of motion, normal strength  NEURO: A&Ox3      CAPILLARY BLOOD GLUCOSE      POCT Blood Glucose.: 223 mg/dL (09 Jun 2025 12:15)  POCT Blood Glucose.: 332 mg/dL (09 Jun 2025 08:26)  POCT Blood Glucose.: 445 mg/dL (09 Jun 2025 06:34)  POCT Blood Glucose.: 546 mg/dL (09 Jun 2025 05:37)                            12.2   17.59 )-----------( 305      ( 09 Jun 2025 11:00 )             35.8       06-09    139  |  101  |  14  ----------------------------<  178[H]  3.6   |  23  |  0.89    eGFR: 101    Ca    9.1      06-09  Mg     2.20     06-09  Phos  3.3     06-09    TPro  7.5  /  Alb  3.2[L]  /  TBili  0.4  /  DBili  x   /  AST  10  /  ALT  8   /  AlkPhos  79  06-09      Thyroid Function Tests:      A1C with Estimated Average Glucose Result: 13.0 % (06-09-25 @ 03:29)

## 2025-06-09 NOTE — H&P ADULT - NSHPSOCIALHISTORY_GEN_ALL_CORE
Lives alone  Ind ambulator  Denies tobacco or illicit substance use  Social EtOH use, last use >1 week ago

## 2025-06-09 NOTE — ED PROVIDER NOTE - PROGRESS NOTE DETAILS
Natasha BAIRD: (Back Charting) labs sig for hyperglycemia, no e/o dka, no cdu beds, UTI likely c/b DM, will arrange for admission, give insulin, fluids, abx.

## 2025-06-09 NOTE — ED PROVIDER NOTE - NSICDXPASTMEDICALHX_GEN_ALL_CORE_FT
PAST MEDICAL HISTORY:  Diabetes     No Past Medical History Patient has no seen a Dr. in 4 years so he is unaware of [facility]

## 2025-06-09 NOTE — ED PROVIDER NOTE - PHYSICAL EXAMINATION
Physical Exam:  Gen: NAD, uncomfortable  HEENT: normal conjunctiva, oral mucosa moist  Lung: no respiratory distress, speaking in full sentences  CV: RRR  Abd: soft, NT, distended no CVA tenderness   MSK: no visible deformities  Neuro: No focal sensory or motor deficits  Skin: Warm, well perfused

## 2025-06-09 NOTE — H&P ADULT - PROBLEM SELECTOR PLAN 4
- Not on medications  - BP elevated, initially 176/100 in setting of urinary retention, after placement of darnell still elevated to 161/93    -> Will start enalapril 5mg daily, uptitrate as needed

## 2025-06-09 NOTE — H&P ADULT - PROBLEM SELECTOR PLAN 3
- Patient states he used to take metformin and an additional daily injection for his DM but stopped taking it around 2022 preferring instead to take herbal and natural supplements  - Here hyperglycemic to 685, A1C 13  - s/p 5U agmelog and 12U lantus    -> Will c/w lantus 12U qAM, admelog 4U qAC TID  -> ISAIAH, FS qAC, CC diet  -> Endocrine c/s (emailed)

## 2025-06-09 NOTE — H&P ADULT - PROBLEM SELECTOR PLAN 1
- Meets sepsis criteria with leukocytosis and tachycardia, pt also reports chills and diaphoresis at home though no fevers  - Lactate wnl  - s/p CTX 1g in ED    -> Will place on ceftriaxone 1g, likely 5 days of abx  -> UCx in lab  -> Check BCx x2 given patient meets sepsis criteria

## 2025-06-09 NOTE — H&P ADULT - ASSESSMENT
This is a 55M with history of DM2 and HTN (not on medications currently for either) who presents to the hospital with complaints of difficulty urinating since last night found to have sepsis due to UTI and uncontrolled DM2 with significant hyperglycemia.

## 2025-06-09 NOTE — H&P ADULT - NSICDXFAMILYHX_GEN_ALL_CORE_FT
FAMILY HISTORY:  Father  Still living? Unknown  Family history of CVA, Age at diagnosis: Age Unknown  FH: CAD (coronary artery disease), Age at diagnosis: Age Unknown    Sibling  Still living? Unknown  Family history of CVA, Age at diagnosis: Age Unknown    Uncle  Still living? Unknown  Family history of CVA, Age at diagnosis: Age Unknown

## 2025-06-09 NOTE — ED ADULT NURSE NOTE - NSICDXPASTMEDICALHX_GEN_ALL_CORE_FT
PAST MEDICAL HISTORY:  Diabetes     No Past Medical History Patient has no seen a Dr. in 4 years so he is unaware of [facility]

## 2025-06-09 NOTE — ED ADULT NURSE NOTE - OBJECTIVE STATEMENT
Pt arrives to room 25 A&Ox3 and ambulatory at baseline. Denies any PH. Pt comes to ED c/o urinary retention x few hours. Pt states x1 week he has had "dribbling" and urinary frequency. Denies headache, dizziness, chest pain, SOB, fevers, chills, nausea, vomiting and diarrhea at this time. Respirations even and unlabored on room air. No acute distress noted. Pt appears uncomfortable. 14fr darnell placed using sterile technique, pt tolerated well. 1L of cloudy output noted. Pt endorsing relief of pain at this time. 20 gauge placed to L AC, labs drawn and sent. Plan of care ongoing, comfort measures provided and safety measures maintained. Awaiting results.

## 2025-06-09 NOTE — H&P ADULT - NSHPPHYSICALEXAM_GEN_ALL_CORE
Vital Signs Last 24 Hrs  T(C): 37 (09 Jun 2025 06:48), Max: 37.1 (09 Jun 2025 06:35)  T(F): 98.6 (09 Jun 2025 06:48), Max: 98.8 (09 Jun 2025 06:35)  HR: 89 (09 Jun 2025 06:48) (89 - 126)  BP: 161/93 (09 Jun 2025 06:48) (153/97 - 176/100)  BP(mean): --  RR: 18 (09 Jun 2025 06:48) (18 - 22)  SpO2: 99% (09 Jun 2025 06:48) (99% - 100%)    Parameters below as of 09 Jun 2025 06:48  Patient On (Oxygen Delivery Method): room air    GENERAL: No acute distress, well-developed  EYES/ENT: EOMI, PERRL, conjunctiva and sclera clear, Neck supple, moist mucosa  CHEST/LUNG: Clear to auscultation bilaterally; No wheeze, equal breath sounds bilaterally   BACK: No spinal tenderness  HEART: Regular rate and rhythm; No murmurs, rubs, or gallops  ABDOMEN: Soft, Mild TTP over the suprapubic area, Nondistended; Bowel sounds present  EXTREMITIES: +DP/PT/Radial pulses, No LE edema or asymmetry  PSYCH: Nl behavior, nl affect  NEUROLOGY: AAOx3, non-focal  SKIN: Normal color, No rashes or lesions

## 2025-06-09 NOTE — H&P ADULT - PROBLEM SELECTOR PLAN 5
DVT ppx - SCDs  Diet - DASH/CC regular diet  Activity - OOB with assistance, increase as tolerated    Fall and aspiration precautions

## 2025-06-09 NOTE — H&P ADULT - NSVTERISKASSESS_GEN_ALL_CORE FT
NOSE: vertical linear laceration to nose, no active bleeding, gaping, 1.5 cm  Right hand: mild swelling, ttp over 2nd MCP, FROM, NVI, sensate intact, no ecchymosis or erythema
Medical Assessment Completed on: 09-Jun-2025 07:06

## 2025-06-09 NOTE — ED ADULT NURSE REASSESSMENT NOTE - NS ED NURSE REASSESS COMMENT FT1
Break RN: received report from WESTON Miller. pt stable resting in bed in non acute distress. Respirations even and unlabored, chest rise symmetrical b/l. Patient awaiting VBG and medication administration. Comfort measures maintained. Bed in lowest position. Safety maintained.

## 2025-06-09 NOTE — CONSULT NOTE ADULT - ASSESSMENT
55M with history of DM2 and HTN (not on medications currently for either) who presents to the hospital with complaints of difficulty urinating since last night. Said that for the past 1 week he has been having urinary frequency, urgency, and dysuria. Had some hematuria initially with these symptoms but that resolved soon after it had started. Was also having chills and diaphoresis but no fevers and therefore did not seek medical attention. Said that since last night he would feel the urge to urinate but would only dribble a little and have a sensation of incomplete voiding. This continued for a while and he started to have pelvic pain and discomfort and therefore came to the hospital. Here had a darnell placed with almost immediate relief in his symptoms. Currently states he has some mild abdominal pain and had 1 episode of NBNB emesis in the ED. Also reports having chronic peripheral neuropathy x years of his feet. Otherwise denies any acute complaints. Endocrine consulted for uncontrolled diabetes, A1c 13.    On arrival to the ED his vitals were T 98.4, P 126, /100, RR22, O2 sat 100% RA. His lab work showed leukocytosis, hyperglycemia to 685 with A1C of 13, and a nl pH. His UA was grossly positive. He was given NS 1L, ceftriaxone 1g, admelog 5U and lantus 12U. He was admitted to medicine.  (09 Jun 2025 07:14)    Endocrine history:   DM dx: diagnosed with type 2 diabetes 5 to 6 years ago, thinks his A1c back then was 10  Medications: metformin 500 mg twice daily – stopped 2 to 3 years ago. Patient states that metformin was not working and was not doing anything for his diabetes since he was still hyperglycemic at home.  Fingersticks/CGM: Does not use CGM.  When he was checking his fingerstick, it was usually 200-300s.  He stopped checking his fingerstick a long time ago.   Diet: Patient stated that he tries to control his diabetes with diet.  He eats a lot of vegetables and makes his own juice–papaya.  He eats rice, bread.  Endocrinologist: None  Ophthalmologist evaluation: Last eye exam was 3 to 4 years ago.  Patient currently unemployed.  He has no medical insurance.  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  Patient not in DKA.     Inpatient plan   - FS goal 100-180 mg/dl   - FS above goal   - Patient received Lantus 12 units this morning  - Continue Lantus 15 units daily at 7 AM.  Will move Lantus 2 hours each day until bedtime reached  - Start Admelog 5 units TID AC.  Hold Admelog if not eating/NPO.  - continue low Admelog correctional scale TID AC  - continue separate low Admelog correctional scale at HS  - FS TID AC & HS ---> q6 if NPO   - hypoglycemia protocol PRN   - consistent carbohydrate diet  - RD consult  - provider to RN - insulin pen teaching    Discharge plan  - Patient uninsured.  - Discussed with patient regarding basal/bolus insulin at home.  Patient tentatively agreed to using insulin, stating "I will try my best".  - Discharge on basal/bolus insulin + metformin 500 mg PO BID for 7 days then increase to 1000 mg PO BID.   - Please send prescription for Basaglar pen + Humalog pen  Coupon code:  LV: [id number]  Group: [id number]  Vein: [id number]  PCN: [id number]  - please send prescription for glucometer and supplies - test strip, lancets, alcohol pads and insulin pen needles.   - Please send prescription for glucose tablets 4G (take 4 tablets) or 15G tablets for blood sugar less than 70 mG/dL, repeat fingerstick in 15 minutes. Call your provider for dose adjustment if needed.   - Follow up at [facility]: [address] [place] [address]; Ph # [phone]         HTN  - outpatient BP goal <130/80   - continue enalapril   - management per primary team         HLD   - LDL goal <70   - Check lipid panel if not done recently   - management per primary team         Juani Kwok, CHERYL-BC  Nurse Practitioner  Division of Endocrinology & Diabetes  Available via Microsoft Teams

## 2025-06-09 NOTE — ED PROVIDER NOTE - ATTENDING CONTRIBUTION TO CARE
I have personally performed a face to face medical and diagnostic evaluation of the patient. I have discussed with and reviewed the Resident's note and agree with the History, ROS, Physical Exam and MDM unless otherwise indicated. A brief summary of my personal evaluation and impression can be found below.    Natasha BAIRD: 55m pmh diabetes pw urinary retention for past 3 hours a/w pain, dysuria. Denies f/c, lightheadness, sob, cp, n/v, h/o bph. Admits to dribbling for several days now, prior to this last week never had episode like this before, NO fever, No CP SOB or BARBER but is having chills no bowel issues, no new back pain he can move and feel everything.     All other ROS negative, except as above and as per HPI and ROS section.    Pt seen and examined after Darnell placed.     VITALS: Initial triage and subsequent vitals have been reviewed by me.  GEN APPEARANCE: Alert, non-toxic, well-appearing, NAD.  HEAD: Atraumatic.  EYES: PERRLa, EOMI, vision grossly intact.   NECK: Supple  CV: RRR, S1S2, no c/r/m/g. Cap refill <2 seconds. No bruits.   LUNGS: CTAB. No abnormal breath sounds.  ABDOMEN: Soft, NTND. No guarding or rebound.   MSK/EXT: No spinal or extremity point tenderness. No CVA ttp. Pelvis stable. No peripheral edema.  NEURO: Alert, follows commands. Weight bearing normal. Speech normal. Sensation and motor normal x4 extremities.   SKIN: Warm, dry and intact. No rash.  PSYCH: Appropriate   Exam (Male): Un-Circumcised penis, external anatomy appears normal, no e/o priapism, no e/o trauma. No testicular swelling, erythema, or tenderness. No urethral discharge or bleeding. Cremasteric reflex intact b/l. darnell now in place  EXAM WITH: Izabela HYATT        Plan/MDM:  exam vss non toxic w PE as above ddx c/f likely urinary retention 2/2 un-dx'd BPH possibly c/b UTI, no new back pain, no fever, can move and feel everything no issue w bowel movements, low c/f spinal pathology abdomen soft ntnd, will check darnell, labs, urine, give meds as needed, reassess, anticipate likely dc.

## 2025-06-10 LAB
ANION GAP SERPL CALC-SCNC: 12 MMOL/L — SIGNIFICANT CHANGE UP (ref 7–14)
BUN SERPL-MCNC: 11 MG/DL — SIGNIFICANT CHANGE UP (ref 7–23)
CALCIUM SERPL-MCNC: 8.5 MG/DL — SIGNIFICANT CHANGE UP (ref 8.4–10.5)
CHLORIDE SERPL-SCNC: 96 MMOL/L — LOW (ref 98–107)
CO2 SERPL-SCNC: 26 MMOL/L — SIGNIFICANT CHANGE UP (ref 22–31)
CREAT SERPL-MCNC: 0.83 MG/DL — SIGNIFICANT CHANGE UP (ref 0.5–1.3)
EGFR: 103 ML/MIN/1.73M2 — SIGNIFICANT CHANGE UP
EGFR: 103 ML/MIN/1.73M2 — SIGNIFICANT CHANGE UP
GLUCOSE BLDC GLUCOMTR-MCNC: 113 MG/DL — HIGH (ref 70–99)
GLUCOSE BLDC GLUCOMTR-MCNC: 176 MG/DL — HIGH (ref 70–99)
GLUCOSE BLDC GLUCOMTR-MCNC: 190 MG/DL — HIGH (ref 70–99)
GLUCOSE BLDC GLUCOMTR-MCNC: 214 MG/DL — HIGH (ref 70–99)
GLUCOSE BLDC GLUCOMTR-MCNC: 224 MG/DL — HIGH (ref 70–99)
GLUCOSE SERPL-MCNC: 186 MG/DL — HIGH (ref 70–99)
HCT VFR BLD CALC: 31.9 % — LOW (ref 39–50)
HGB BLD-MCNC: 10.6 G/DL — LOW (ref 13–17)
MAGNESIUM SERPL-MCNC: 2 MG/DL — SIGNIFICANT CHANGE UP (ref 1.6–2.6)
MCHC RBC-ENTMCNC: 27.7 PG — SIGNIFICANT CHANGE UP (ref 27–34)
MCHC RBC-ENTMCNC: 33.2 G/DL — SIGNIFICANT CHANGE UP (ref 32–36)
MCV RBC AUTO: 83.3 FL — SIGNIFICANT CHANGE UP (ref 80–100)
NRBC # BLD AUTO: 0 K/UL — SIGNIFICANT CHANGE UP (ref 0–0)
NRBC # FLD: 0 K/UL — SIGNIFICANT CHANGE UP (ref 0–0)
NRBC BLD AUTO-RTO: 0 /100 WBCS — SIGNIFICANT CHANGE UP (ref 0–0)
PHOSPHATE SERPL-MCNC: 3.5 MG/DL — SIGNIFICANT CHANGE UP (ref 2.5–4.5)
PLATELET # BLD AUTO: 290 K/UL — SIGNIFICANT CHANGE UP (ref 150–400)
POTASSIUM SERPL-MCNC: 3.3 MMOL/L — LOW (ref 3.5–5.3)
POTASSIUM SERPL-SCNC: 3.3 MMOL/L — LOW (ref 3.5–5.3)
RBC # BLD: 3.83 M/UL — LOW (ref 4.2–5.8)
RBC # FLD: 11.9 % — SIGNIFICANT CHANGE UP (ref 10.3–14.5)
SODIUM SERPL-SCNC: 134 MMOL/L — LOW (ref 135–145)
WBC # BLD: 17.32 K/UL — HIGH (ref 3.8–10.5)
WBC # FLD AUTO: 17.32 K/UL — HIGH (ref 3.8–10.5)

## 2025-06-10 PROCEDURE — 99232 SBSQ HOSP IP/OBS MODERATE 35: CPT

## 2025-06-10 RX ORDER — INSULIN GLARGINE-YFGN 100 [IU]/ML
17 INJECTION, SOLUTION SUBCUTANEOUS
Refills: 0 | Status: DISCONTINUED | OUTPATIENT
Start: 2025-06-10 | End: 2025-06-11

## 2025-06-10 RX ORDER — METFORMIN HYDROCHLORIDE 850 MG/1
1 TABLET ORAL
Qty: 98 | Refills: 0
Start: 2025-06-10 | End: 2025-07-09

## 2025-06-10 RX ORDER — INSULIN LISPRO 100 U/ML
5 INJECTION, SOLUTION INTRAVENOUS; SUBCUTANEOUS
Qty: 5 | Refills: 0
Start: 2025-06-10 | End: 2025-07-09

## 2025-06-10 RX ORDER — INSULIN GLARGINE-YFGN 100 [IU]/ML
17 INJECTION, SOLUTION SUBCUTANEOUS
Qty: 5 | Refills: 0
Start: 2025-06-10 | End: 2025-07-09

## 2025-06-10 RX ORDER — ISOPROPYL ALCOHOL, BENZOCAINE .7; .06 ML/ML; ML/ML
0 SWAB TOPICAL
Qty: 100 | Refills: 1
Start: 2025-06-10

## 2025-06-10 RX ADMIN — INSULIN GLARGINE-YFGN 15 UNIT(S): 100 INJECTION, SOLUTION SUBCUTANEOUS at 09:34

## 2025-06-10 RX ADMIN — INSULIN LISPRO 5 UNIT(S): 100 INJECTION, SOLUTION INTRAVENOUS; SUBCUTANEOUS at 12:32

## 2025-06-10 RX ADMIN — INSULIN LISPRO 1: 100 INJECTION, SOLUTION INTRAVENOUS; SUBCUTANEOUS at 12:32

## 2025-06-10 RX ADMIN — Medication 40 MILLIEQUIVALENT(S): at 09:26

## 2025-06-10 RX ADMIN — Medication 5 MILLIGRAM(S): at 05:22

## 2025-06-10 RX ADMIN — INSULIN LISPRO 2: 100 INJECTION, SOLUTION INTRAVENOUS; SUBCUTANEOUS at 09:20

## 2025-06-10 RX ADMIN — INSULIN LISPRO 5 UNIT(S): 100 INJECTION, SOLUTION INTRAVENOUS; SUBCUTANEOUS at 09:21

## 2025-06-10 RX ADMIN — Medication 40 MILLIEQUIVALENT(S): at 15:23

## 2025-06-10 RX ADMIN — CEFTRIAXONE 100 MILLIGRAM(S): 500 INJECTION, POWDER, FOR SOLUTION INTRAMUSCULAR; INTRAVENOUS at 03:57

## 2025-06-10 RX ADMIN — INSULIN LISPRO 5 UNIT(S): 100 INJECTION, SOLUTION INTRAVENOUS; SUBCUTANEOUS at 17:50

## 2025-06-10 NOTE — PATIENT PROFILE ADULT - FALL HARM RISK - HARM RISK INTERVENTIONS

## 2025-06-10 NOTE — PROGRESS NOTE ADULT - SUBJECTIVE AND OBJECTIVE BOX
[facility] Division of Hospital Medicine  Samuelnegritaobinna Ortiz DO  Available via MS Teams  Pager: [phone]    Patient is a 55y old  Male who presents with a chief complaint of Urinary retention, Sepsis due to UTI, Uncontrolled DM2 (10 Noble 2025 12:07)      SUBJECTIVE / OVERNIGHT EVENTS:    Pt seen and examined this morning. Pt states he feels much better today.    ADDITIONAL REVIEW OF SYSTEMS:  No Fever, chills, chest pain, shortness of breath.     MEDICATIONS  (STANDING):  cefTRIAXone   IVPB 1000 milliGRAM(s) IV Intermittent every 24 hours  dextrose 5%. 1000 milliLiter(s) (100 mL/Hr) IV Continuous <Continuous>  dextrose 5%. 1000 milliLiter(s) (50 mL/Hr) IV Continuous <Continuous>  dextrose 50% Injectable 25 Gram(s) IV Push once  dextrose 50% Injectable 12.5 Gram(s) IV Push once  dextrose 50% Injectable 25 Gram(s) IV Push once  enalapril 5 milliGRAM(s) Oral daily  glucagon  Injectable 1 milliGRAM(s) IntraMuscular once  insulin glargine Injectable (LANTUS) 17 Unit(s) SubCutaneous <User Schedule>  insulin lispro (ADMELOG) corrective regimen sliding scale   SubCutaneous three times a day before meals  insulin lispro (ADMELOG) corrective regimen sliding scale   SubCutaneous at bedtime  insulin lispro Injectable (ADMELOG) 5 Unit(s) SubCutaneous three times a day before meals  potassium chloride    Tablet ER 40 milliEquivalent(s) Oral every 4 hours    MEDICATIONS  (PRN):  acetaminophen     Tablet .. 650 milliGRAM(s) Oral every 6 hours PRN Temp greater or equal to 38C (100.4F), Mild Pain (1 - 3)  aluminum hydroxide/magnesium hydroxide/simethicone Suspension 30 milliLiter(s) Oral every 4 hours PRN Dyspepsia  dextrose Oral Gel 15 Gram(s) Oral once PRN Blood Glucose LESS THAN 70 milliGRAM(s)/deciliter  melatonin 3 milliGRAM(s) Oral at bedtime PRN Insomnia  ondansetron Injectable 4 milliGRAM(s) IV Push every 8 hours PRN Nausea and/or Vomiting      I&O's Summary    09 Jun 2025 07:01  -  10 Noble 2025 07:00  --------------------------------------------------------  IN: 290 mL / OUT: 450 mL / NET: -160 mL    10 Noble 2025 07:01  -  10 Noble 2025 12:24  --------------------------------------------------------  IN: 0 mL / OUT: 325 mL / NET: -325 mL        PHYSICAL EXAM:  Vital Signs Last 24 Hrs  T(C): 36.7 (10 Noble 2025 09:42), Max: 37.3 (09 Jun 2025 21:30)  T(F): 98 (10 Noble 2025 09:42), Max: 99.1 (09 Jun 2025 21:30)  HR: 85 (10 Noble 2025 09:42) (77 - 91)  BP: 146/92 (10 Noble 2025 09:42) (126/84 - 148/85)  BP(mean): --  RR: 16 (10 Noble 2025 09:42) (16 - 18)  SpO2: 100% (10 Noble 2025 09:42) (95% - 100%)    Parameters below as of 10 Noble 2025 09:42  Patient On (Oxygen Delivery Method): room air      CONSTITUTIONAL: NAD  EYES: PERRLA  ENMT: Moist oral mucosa  RESPIRATORY: Normal respiratory effort; lungs are clear to auscultation bilaterally  CARDIOVASCULAR: Regular rate and rhythm, normal S1 and S2, no murmur/rub/gallop  ABDOMEN: Nontender to palpation, normoactive bowel sounds  PSYCH: A+O to person, place, and time  NEUROLOGY: CN 2-12 are intact and symmetric; no gross sensory deficits     LABS:                        10.6   17.32 )-----------( 290      ( 10 Noble 2025 03:56 )             31.9     06-10    134[L]  |  96[L]  |  11  ----------------------------<  186[H]  3.3[L]   |  26  |  0.83    Ca    8.5      10 Noble 2025 03:56  Phos  3.5     06-10  Mg     2.00     06-10    TPro  7.5  /  Alb  3.2[L]  /  TBili  0.4  /  DBili  x   /  AST  10  /  ALT  8   /  AlkPhos  79  06-09          Urinalysis Basic - ( 10 Noble 2025 03:56 )    Color: x / Appearance: x / SG: x / pH: x  Gluc: 186 mg/dL / Ketone: x  / Bili: x / Urobili: x   Blood: x / Protein: x / Nitrite: x   Leuk Esterase: x / RBC: x / WBC x   Sq Epi: x / Non Sq Epi: x / Bacteria: x        Culture - Urine (collected 09 Jun 2025 03:27)  Source: Clean Catch Clean Catch (Midstream)  Preliminary Report (10 Noble 2025 08:40):    Culture positive, >100,000 CFU/ml . Identification to follow.

## 2025-06-10 NOTE — PROGRESS NOTE ADULT - SUBJECTIVE AND OBJECTIVE BOX
Chief Complaint: type 2 DM    History: Tolerating PO diet. Good appetite. FS at goal last night. Serum glucose 186 this am and first morning FS was 190, at 9am FS was 224 and FS this afternoon was 176.     MEDICATIONS  (STANDING):  cefTRIAXone   IVPB 1000 milliGRAM(s) IV Intermittent every 24 hours  dextrose 5%. 1000 milliLiter(s) (100 mL/Hr) IV Continuous <Continuous>  dextrose 5%. 1000 milliLiter(s) (50 mL/Hr) IV Continuous <Continuous>  dextrose 50% Injectable 25 Gram(s) IV Push once  dextrose 50% Injectable 12.5 Gram(s) IV Push once  dextrose 50% Injectable 25 Gram(s) IV Push once  enalapril 5 milliGRAM(s) Oral daily  glucagon  Injectable 1 milliGRAM(s) IntraMuscular once  insulin lispro (ADMELOG) corrective regimen sliding scale   SubCutaneous three times a day before meals  insulin lispro (ADMELOG) corrective regimen sliding scale   SubCutaneous at bedtime  insulin lispro Injectable (ADMELOG) 5 Unit(s) SubCutaneous three times a day before meals  potassium chloride    Tablet ER 40 milliEquivalent(s) Oral every 4 hours    MEDICATIONS  (PRN):  acetaminophen     Tablet .. 650 milliGRAM(s) Oral every 6 hours PRN Temp greater or equal to 38C (100.4F), Mild Pain (1 - 3)  aluminum hydroxide/magnesium hydroxide/simethicone Suspension 30 milliLiter(s) Oral every 4 hours PRN Dyspepsia  dextrose Oral Gel 15 Gram(s) Oral once PRN Blood Glucose LESS THAN 70 milliGRAM(s)/deciliter  melatonin 3 milliGRAM(s) Oral at bedtime PRN Insomnia  ondansetron Injectable 4 milliGRAM(s) IV Push every 8 hours PRN Nausea and/or Vomiting      Allergies    No Known Allergies    Intolerances      Review of Systems:  Cardiovascular: No chest pain, palpitations  Respiratory: No SOB, no cough  GI: No nausea, vomiting, abdominal pain  : No dysuria  Endocrine: no polyuria, polydipsia      PHYSICAL EXAM:  VITALS: T(C): 36.7 (06-10-25 @ 09:42)  T(F): 98 (06-10-25 @ 09:42), Max: 99.1 (06-09-25 @ 21:30)  HR: 85 (06-10-25 @ 09:42) (77 - 91)  BP: 146/92 (06-10-25 @ 09:42) (126/84 - 148/85)  RR:  (16 - 18)  SpO2:  (95% - 100%)  Wt(kg): --  GENERAL: NAD, well-groomed, well-developed  EYES: No proptosis  HEENT:  Atraumatic, Normocephalic  RESPIRATORY: non labored breathing   CARDIOVASCULAR: Regular rate and rhythm  GI: Soft, nontender, non distended  PSYCH: Alert and oriented x 3, normal affect, normal mood       CAPILLARY BLOOD GLUCOSE      POCT Blood Glucose.: 176 mg/dL (10 Noble 2025 11:52)  POCT Blood Glucose.: 224 mg/dL (10 Noble 2025 09:09)  POCT Blood Glucose.: 190 mg/dL (10 Noble 2025 07:11)  POCT Blood Glucose.: 107 mg/dL (09 Jun 2025 22:36)  POCT Blood Glucose.: 70 mg/dL (09 Jun 2025 21:41)  POCT Blood Glucose.: 170 mg/dL (09 Jun 2025 17:07)  POCT Blood Glucose.: 223 mg/dL (09 Jun 2025 12:15)      06-10    134[L]  |  96[L]  |  11  ----------------------------<  186[H]  3.3[L]   |  26  |  0.83    eGFR: 103    Ca    8.5      06-10  Mg     2.00     06-10  Phos  3.5     06-10    TPro  7.5  /  Alb  3.2[L]  /  TBili  0.4  /  DBili  x   /  AST  10  /  ALT  8   /  AlkPhos  79  06-09          Thyroid Function Tests:        A1C with Estimated Average Glucose Result: 13.0 % (06-09-25 @ 03:29)          Diet, Regular:   Consistent Carbohydrate Evening Snack (CSTCHOSN)  DASH/TLC Sodium & Cholesterol Restricted (DASH) (06-09-25 @ 07:12)

## 2025-06-11 ENCOUNTER — TRANSCRIPTION ENCOUNTER (OUTPATIENT)
Age: 55
End: 2025-06-11

## 2025-06-11 LAB
ANION GAP SERPL CALC-SCNC: 14 MMOL/L — SIGNIFICANT CHANGE UP (ref 7–14)
BUN SERPL-MCNC: 11 MG/DL — SIGNIFICANT CHANGE UP (ref 7–23)
CALCIUM SERPL-MCNC: 8.8 MG/DL — SIGNIFICANT CHANGE UP (ref 8.4–10.5)
CHLORIDE SERPL-SCNC: 96 MMOL/L — LOW (ref 98–107)
CO2 SERPL-SCNC: 24 MMOL/L — SIGNIFICANT CHANGE UP (ref 22–31)
CREAT SERPL-MCNC: 0.95 MG/DL — SIGNIFICANT CHANGE UP (ref 0.5–1.3)
EGFR: 94 ML/MIN/1.73M2 — SIGNIFICANT CHANGE UP
EGFR: 94 ML/MIN/1.73M2 — SIGNIFICANT CHANGE UP
GLUCOSE BLDC GLUCOMTR-MCNC: 179 MG/DL — HIGH (ref 70–99)
GLUCOSE BLDC GLUCOMTR-MCNC: 200 MG/DL — HIGH (ref 70–99)
GLUCOSE BLDC GLUCOMTR-MCNC: 201 MG/DL — HIGH (ref 70–99)
GLUCOSE BLDC GLUCOMTR-MCNC: 251 MG/DL — HIGH (ref 70–99)
GLUCOSE BLDC GLUCOMTR-MCNC: 326 MG/DL — HIGH (ref 70–99)
GLUCOSE SERPL-MCNC: 177 MG/DL — HIGH (ref 70–99)
HCT VFR BLD CALC: 34.9 % — LOW (ref 39–50)
HGB BLD-MCNC: 11.6 G/DL — LOW (ref 13–17)
MAGNESIUM SERPL-MCNC: 2 MG/DL — SIGNIFICANT CHANGE UP (ref 1.6–2.6)
MCHC RBC-ENTMCNC: 28 PG — SIGNIFICANT CHANGE UP (ref 27–34)
MCHC RBC-ENTMCNC: 33.2 G/DL — SIGNIFICANT CHANGE UP (ref 32–36)
MCV RBC AUTO: 84.1 FL — SIGNIFICANT CHANGE UP (ref 80–100)
NRBC # BLD AUTO: 0 K/UL — SIGNIFICANT CHANGE UP (ref 0–0)
NRBC # FLD: 0 K/UL — SIGNIFICANT CHANGE UP (ref 0–0)
NRBC BLD AUTO-RTO: 0 /100 WBCS — SIGNIFICANT CHANGE UP (ref 0–0)
PHOSPHATE SERPL-MCNC: 3.7 MG/DL — SIGNIFICANT CHANGE UP (ref 2.5–4.5)
PLATELET # BLD AUTO: 321 K/UL — SIGNIFICANT CHANGE UP (ref 150–400)
POTASSIUM SERPL-MCNC: 3.7 MMOL/L — SIGNIFICANT CHANGE UP (ref 3.5–5.3)
POTASSIUM SERPL-SCNC: 3.7 MMOL/L — SIGNIFICANT CHANGE UP (ref 3.5–5.3)
RBC # BLD: 4.15 M/UL — LOW (ref 4.2–5.8)
RBC # FLD: 11.9 % — SIGNIFICANT CHANGE UP (ref 10.3–14.5)
SODIUM SERPL-SCNC: 134 MMOL/L — LOW (ref 135–145)
WBC # BLD: 12.69 K/UL — HIGH (ref 3.8–10.5)
WBC # FLD AUTO: 12.69 K/UL — HIGH (ref 3.8–10.5)

## 2025-06-11 PROCEDURE — 99233 SBSQ HOSP IP/OBS HIGH 50: CPT

## 2025-06-11 PROCEDURE — 99232 SBSQ HOSP IP/OBS MODERATE 35: CPT

## 2025-06-11 RX ORDER — ENALAPRIL MALEATE 20 MG
5 TABLET ORAL ONCE
Refills: 0 | Status: COMPLETED | OUTPATIENT
Start: 2025-06-11 | End: 2025-06-11

## 2025-06-11 RX ORDER — INSULIN GLARGINE-YFGN 100 [IU]/ML
19 INJECTION, SOLUTION SUBCUTANEOUS
Refills: 0 | Status: DISCONTINUED | OUTPATIENT
Start: 2025-06-12 | End: 2025-06-12

## 2025-06-11 RX ORDER — INSULIN LISPRO 100 U/ML
5 INJECTION, SOLUTION INTRAVENOUS; SUBCUTANEOUS
Qty: 0 | Refills: 0 | DISCHARGE
Start: 2025-06-11

## 2025-06-11 RX ORDER — ENALAPRIL MALEATE 20 MG
1 TABLET ORAL
Qty: 0 | Refills: 0 | DISCHARGE
Start: 2025-06-11

## 2025-06-11 RX ORDER — INSULIN LISPRO 100 U/ML
6 INJECTION, SOLUTION INTRAVENOUS; SUBCUTANEOUS
Refills: 0 | Status: DISCONTINUED | OUTPATIENT
Start: 2025-06-11 | End: 2025-06-12

## 2025-06-11 RX ORDER — INSULIN LISPRO 100 U/ML
5 INJECTION, SOLUTION INTRAVENOUS; SUBCUTANEOUS
Qty: 6 | Refills: 0
Start: 2025-06-11 | End: 2025-07-10

## 2025-06-11 RX ORDER — INSULIN GLARGINE-YFGN 100 [IU]/ML
19 INJECTION, SOLUTION SUBCUTANEOUS
Qty: 7 | Refills: 0
Start: 2025-06-11 | End: 2025-07-10

## 2025-06-11 RX ORDER — ENALAPRIL MALEATE 20 MG
10 TABLET ORAL DAILY
Refills: 0 | Status: DISCONTINUED | OUTPATIENT
Start: 2025-06-12 | End: 2025-06-19

## 2025-06-11 RX ORDER — ENALAPRIL MALEATE 20 MG
1 TABLET ORAL
Qty: 90 | Refills: 0
Start: 2025-06-11 | End: 2025-09-08

## 2025-06-11 RX ORDER — INSULIN LISPRO 100 U/ML
6 INJECTION, SOLUTION INTRAVENOUS; SUBCUTANEOUS
Qty: 6 | Refills: 0
Start: 2025-06-11 | End: 2025-07-10

## 2025-06-11 RX ORDER — PHENAZOPYRIDINE HCL 100 MG
100 TABLET ORAL EVERY 8 HOURS
Refills: 0 | Status: COMPLETED | OUTPATIENT
Start: 2025-06-11 | End: 2025-06-13

## 2025-06-11 RX ORDER — INSULIN GLARGINE-YFGN 100 [IU]/ML
17 INJECTION, SOLUTION SUBCUTANEOUS
Qty: 0 | Refills: 0 | DISCHARGE
Start: 2025-06-11

## 2025-06-11 RX ADMIN — CEFTRIAXONE 100 MILLIGRAM(S): 500 INJECTION, POWDER, FOR SOLUTION INTRAMUSCULAR; INTRAVENOUS at 02:57

## 2025-06-11 RX ADMIN — INSULIN LISPRO 1: 100 INJECTION, SOLUTION INTRAVENOUS; SUBCUTANEOUS at 21:18

## 2025-06-11 RX ADMIN — INSULIN GLARGINE-YFGN 17 UNIT(S): 100 INJECTION, SOLUTION SUBCUTANEOUS at 09:19

## 2025-06-11 RX ADMIN — Medication 100 MILLIGRAM(S): at 21:12

## 2025-06-11 RX ADMIN — INSULIN LISPRO 5 UNIT(S): 100 INJECTION, SOLUTION INTRAVENOUS; SUBCUTANEOUS at 09:18

## 2025-06-11 RX ADMIN — Medication 5 MILLIGRAM(S): at 05:00

## 2025-06-11 RX ADMIN — Medication 5 MILLIGRAM(S): at 15:50

## 2025-06-11 RX ADMIN — INSULIN LISPRO 1: 100 INJECTION, SOLUTION INTRAVENOUS; SUBCUTANEOUS at 17:56

## 2025-06-11 RX ADMIN — INSULIN LISPRO 2: 100 INJECTION, SOLUTION INTRAVENOUS; SUBCUTANEOUS at 09:18

## 2025-06-11 RX ADMIN — INSULIN LISPRO 4: 100 INJECTION, SOLUTION INTRAVENOUS; SUBCUTANEOUS at 12:44

## 2025-06-11 RX ADMIN — INSULIN LISPRO 6 UNIT(S): 100 INJECTION, SOLUTION INTRAVENOUS; SUBCUTANEOUS at 17:57

## 2025-06-11 RX ADMIN — INSULIN LISPRO 5 UNIT(S): 100 INJECTION, SOLUTION INTRAVENOUS; SUBCUTANEOUS at 12:45

## 2025-06-11 NOTE — PROGRESS NOTE ADULT - SUBJECTIVE AND OBJECTIVE BOX
Chief Complaint: type 2 DM    History: Tolerating PO diet. FS right at higher end of goal this morning and above goal this afternoon.     MEDICATIONS  (STANDING):  cefTRIAXone   IVPB 1000 milliGRAM(s) IV Intermittent every 24 hours  dextrose 5%. 1000 milliLiter(s) (100 mL/Hr) IV Continuous <Continuous>  dextrose 5%. 1000 milliLiter(s) (50 mL/Hr) IV Continuous <Continuous>  dextrose 50% Injectable 25 Gram(s) IV Push once  dextrose 50% Injectable 12.5 Gram(s) IV Push once  dextrose 50% Injectable 25 Gram(s) IV Push once  enalapril 5 milliGRAM(s) Oral once  glucagon  Injectable 1 milliGRAM(s) IntraMuscular once  insulin lispro (ADMELOG) corrective regimen sliding scale   SubCutaneous three times a day before meals  insulin lispro (ADMELOG) corrective regimen sliding scale   SubCutaneous at bedtime  insulin lispro Injectable (ADMELOG) 6 Unit(s) SubCutaneous three times a day before meals    MEDICATIONS  (PRN):  acetaminophen     Tablet .. 650 milliGRAM(s) Oral every 6 hours PRN Temp greater or equal to 38C (100.4F), Mild Pain (1 - 3)  aluminum hydroxide/magnesium hydroxide/simethicone Suspension 30 milliLiter(s) Oral every 4 hours PRN Dyspepsia  dextrose Oral Gel 15 Gram(s) Oral once PRN Blood Glucose LESS THAN 70 milliGRAM(s)/deciliter  melatonin 3 milliGRAM(s) Oral at bedtime PRN Insomnia  ondansetron Injectable 4 milliGRAM(s) IV Push every 8 hours PRN Nausea and/or Vomiting      Allergies    No Known Allergies    Intolerances      Review of Systems:  Cardiovascular: No chest pain, palpitations  Respiratory: No SOB, no cough  GI: No nausea, vomiting, abdominal pain  : No dysuria  Endocrine: no polyuria, polydipsia      PHYSICAL EXAM:  VITALS: T(C): 36.8 (06-11-25 @ 07:15)  T(F): 98.2 (06-11-25 @ 07:15), Max: 98.9 (06-10-25 @ 22:00)  HR: 91 (06-11-25 @ 07:15) (83 - 100)  BP: 150/105 (06-11-25 @ 07:15) (139/93 - 150/105)  RR:  (17 - 18)  SpO2:  (98% - 100%)  Wt(kg): --  GENERAL: NAD, well-groomed, well-developed  EYES: No proptosis  HEENT:  Atraumatic, Normocephalic  RESPIRATORY: non labored breathing   CARDIOVASCULAR: Regular rate and rhythm  GI: Soft, nontender, non distended  PSYCH: Alert and oriented x 3, normal affect, normal mood     CAPILLARY BLOOD GLUCOSE      POCT Blood Glucose.: 326 mg/dL (11 Jun 2025 12:42)  POCT Blood Glucose.: 201 mg/dL (11 Jun 2025 09:09)  POCT Blood Glucose.: 179 mg/dL (11 Jun 2025 06:41)  POCT Blood Glucose.: 214 mg/dL (10 Noble 2025 22:07)  POCT Blood Glucose.: 113 mg/dL (10 Noble 2025 17:14)      06-11    134[L]  |  96[L]  |  11  ----------------------------<  177[H]  3.7   |  24  |  0.95    eGFR: 94    Ca    8.8      06-11  Mg     2.00     06-11  Phos  3.7     06-11    TPro  7.5  /  Alb  3.2[L]  /  TBili  0.4  /  DBili  x   /  AST  10  /  ALT  8   /  AlkPhos  79  06-09          Thyroid Function Tests:        A1C with Estimated Average Glucose Result: 13.0 % (06-09-25 @ 03:29)          Diet, Regular:   Consistent Carbohydrate Evening Snack (CSTCHOSN)  DASH/TLC Sodium & Cholesterol Restricted (DASH) (06-09-25 @ 07:12)

## 2025-06-11 NOTE — DISCHARGE NOTE PROVIDER - NSDCMRMEDTOKEN_GEN_ALL_CORE_FT
enalapril 10 mg oral tablet: 1 tab(s) orally once a day  insulin glargine 100 units/mL subcutaneous solution: 17 unit(s) subcutaneous once a day at 9AM during day  insulin lispro 100 units/mL injectable solution: 5 unit(s) injectable 3 times a day (before meals) Hold for hypoglycemia or if not eating meal  metFORMIN 500 mg oral tablet: 1 tab(s) orally 2 times a day Take 1 tablet twice daily for 7 days, then take 2 tablets twice daily thereafter.   enalapril 10 mg oral tablet: 1 tab(s) orally once a day  insulin glargine 100 units/mL subcutaneous solution: 19 unit(s) subcutaneous once a day At 11AM on 6/12/2025, then move it by 2 hours every day till you make it the nighttime dose (1PM on 6/13/25, 3PM on 6/14/25 and so on till it becomes nighttime dosing)  insulin lispro 100 units/mL injectable solution: 5 unit(s) injectable 3 times a day (before meals) Hold for hypoglycemia or if not eating meal  metFORMIN 500 mg oral tablet: 1 tab(s) orally 2 times a day Take 1 tablet twice daily for 7 days, then take 2 tablets twice daily thereafter.   ceFAZolin 2 g intravenous injection: 2 gram(s) intravenous every 8 hours Until 7/11/25  enalapril 10 mg oral tablet: 1 tab(s) orally once a day  insulin glargine 100 units/mL subcutaneous solution: 19 unit(s) subcutaneous once a day At 11AM on 6/12/2025, then move it by 2 hours every day till you make it the nighttime dose (1PM on 6/13/25, 3PM on 6/14/25 and so on till it becomes nighttime dosing)  insulin lispro 100 units/mL injectable solution: 5 unit(s) injectable 3 times a day (before meals) Hold for hypoglycemia or if not eating meal  metFORMIN 500 mg oral tablet: 1 tab(s) orally 2 times a day Take 1 tablet twice daily for 7 days, then take 2 tablets twice daily thereafter.  Normal Saline 0.9 % 10 cc flush pre and post IV abx infusion: Until 7/11/25  PICC line Supplies and Care: Until 7/11/25   ceFAZolin 2 g intravenous injection: 2 gram(s) intravenous every 8 hours Until 7/11/25  enalapril 10 mg oral tablet: 1 tab(s) orally once a day  finasteride 5 mg oral tablet: 1 tab(s) orally once a day  insulin glargine 100 units/mL subcutaneous solution: 19 unit(s) subcutaneous once a day At 11AM on 6/12/2025, then move it by 2 hours every day till you make it the nighttime dose (1PM on 6/13/25, 3PM on 6/14/25 and so on till it becomes nighttime dosing)  insulin lispro 100 units/mL injectable solution: 5 unit(s) injectable 3 times a day (before meals) Hold for hypoglycemia or if not eating meal  metFORMIN 500 mg oral tablet: 1 tab(s) orally 2 times a day Take 1 tablet twice daily for 7 days, then take 2 tablets twice daily thereafter.  Normal Saline 0.9 % 10 cc flush pre and post IV abx infusion: Until 7/11/25  PICC line Supplies and Care: Until 7/11/25  tamsulosin 0.4 mg oral capsule: 1 cap(s) orally once a day (at bedtime)   Basaglar KwikPen 100 units/mL subcutaneous solution: 15 unit(s) subcutaneous once a day (at bedtime) unit(s) subcutaneous once a day  ceFAZolin 2 g intravenous injection: 2 gram(s) intravenous every 8 hours Until 7/11/25  enalapril 10 mg oral tablet: 1 tab(s) orally once a day  finasteride 5 mg oral tablet: 1 tab(s) orally once a day  metFORMIN 500 mg oral tablet: 1 tab(s) orally 2 times a day Take 1 tablet twice daily for 7 days, then take 2 tablets twice daily thereafter.  Normal Saline 0.9 % 10 cc flush pre and post IV abx infusion: Until 7/11/25  PICC line Supplies and Care: Until 7/11/25  tamsulosin 0.4 mg oral capsule: 1 cap(s) orally once a day (at bedtime)   ceFAZolin 2 g intravenous injection: 2 gram(s) intravenous every 8 hours Until 7/11/25  enalapril 10 mg oral tablet: 1 tab(s) orally once a day  finasteride 5 mg oral tablet: 1 tab(s) orally once a day  Lantus Solostar Pen 100 units/mL subcutaneous solution: 15 unit(s) subcutaneous once a day (at bedtime)  metFORMIN 500 mg oral tablet: 1 tab(s) orally 2 times a day Take 1 tablet twice daily for 7 days, then take 2 tablets twice daily thereafter.  Normal Saline 0.9 % 10 cc flush pre and post IV abx infusion: Until 7/11/25  PICC line Supplies and Care: Until 7/11/25  tamsulosin 0.4 mg oral capsule: 1 cap(s) orally once a day (at bedtime)

## 2025-06-11 NOTE — PROGRESS NOTE ADULT - SUBJECTIVE AND OBJECTIVE BOX
[facility] Division of Hospital Medicine  Martha Ortiz DO  Available via MS Teams  Pager: [phone]    Patient is a 55y old  Male who presents with a chief complaint of Urinary retention, Sepsis due to UTI, Uncontrolled DM2 (11 Jun 2025 13:07)      SUBJECTIVE / OVERNIGHT EVENTS:    Pt seen and examined this morning. Pt resting in bed and states he feels well except still having trouble urinating.    ADDITIONAL REVIEW OF SYSTEMS:  No Fever, chills, chest pain, shortness of breath.     MEDICATIONS  (STANDING):  cefTRIAXone   IVPB 1000 milliGRAM(s) IV Intermittent every 24 hours  dextrose 5%. 1000 milliLiter(s) (100 mL/Hr) IV Continuous <Continuous>  dextrose 5%. 1000 milliLiter(s) (50 mL/Hr) IV Continuous <Continuous>  dextrose 50% Injectable 25 Gram(s) IV Push once  dextrose 50% Injectable 12.5 Gram(s) IV Push once  dextrose 50% Injectable 25 Gram(s) IV Push once  enalapril 5 milliGRAM(s) Oral once  glucagon  Injectable 1 milliGRAM(s) IntraMuscular once  insulin lispro (ADMELOG) corrective regimen sliding scale   SubCutaneous three times a day before meals  insulin lispro (ADMELOG) corrective regimen sliding scale   SubCutaneous at bedtime  insulin lispro Injectable (ADMELOG) 6 Unit(s) SubCutaneous three times a day before meals    MEDICATIONS  (PRN):  acetaminophen     Tablet .. 650 milliGRAM(s) Oral every 6 hours PRN Temp greater or equal to 38C (100.4F), Mild Pain (1 - 3)  aluminum hydroxide/magnesium hydroxide/simethicone Suspension 30 milliLiter(s) Oral every 4 hours PRN Dyspepsia  dextrose Oral Gel 15 Gram(s) Oral once PRN Blood Glucose LESS THAN 70 milliGRAM(s)/deciliter  melatonin 3 milliGRAM(s) Oral at bedtime PRN Insomnia  ondansetron Injectable 4 milliGRAM(s) IV Push every 8 hours PRN Nausea and/or Vomiting      I&O's Summary    10 Noble 2025 07:01  -  11 Jun 2025 07:00  --------------------------------------------------------  IN: 720 mL / OUT: 1625 mL / NET: -905 mL        PHYSICAL EXAM:  Vital Signs Last 24 Hrs  T(C): 36.8 (11 Jun 2025 07:15), Max: 37.2 (10 Noble 2025 22:00)  T(F): 98.2 (11 Jun 2025 07:15), Max: 98.9 (10 Noble 2025 22:00)  HR: 91 (11 Jun 2025 07:15) (83 - 100)  BP: 150/105 (11 Jun 2025 07:15) (139/93 - 150/105)  BP(mean): --  RR: 18 (11 Jun 2025 07:15) (17 - 18)  SpO2: 99% (11 Jun 2025 07:15) (98% - 100%)    Parameters below as of 11 Jun 2025 07:15  Patient On (Oxygen Delivery Method): room air      CONSTITUTIONAL: NAD  EYES: PERRLA  ENMT: Moist oral mucosa  RESPIRATORY: Normal respiratory effort; lungs are clear to auscultation bilaterally  CARDIOVASCULAR: Regular rate and rhythm, normal S1 and S2, no murmur/rub/gallop  ABDOMEN: Nontender to palpation, normoactive bowel sounds  PSYCH: A+O to person, place, and time  NEUROLOGY: CN 2-12 are intact and symmetric; no gross sensory deficits     LABS:                        11.6   12.69 )-----------( 321      ( 11 Jun 2025 04:10 )             34.9     06-11    134[L]  |  96[L]  |  11  ----------------------------<  177[H]  3.7   |  24  |  0.95    Ca    8.8      11 Jun 2025 04:10  Phos  3.7     06-11  Mg     2.00     06-11            Urinalysis Basic - ( 11 Jun 2025 04:10 )    Color: x / Appearance: x / SG: x / pH: x  Gluc: 177 mg/dL / Ketone: x  / Bili: x / Urobili: x   Blood: x / Protein: x / Nitrite: x   Leuk Esterase: x / RBC: x / WBC x   Sq Epi: x / Non Sq Epi: x / Bacteria: x        Culture - Blood (collected 10 Noble 2025 09:47)  Source: Blood Blood-Peripheral  Preliminary Report (11 Jun 2025 13:02):    No growth at 24 hours    Culture - Blood (collected 10 Noble 2025 09:40)  Source: Blood Blood-Peripheral  Preliminary Report (11 Jun 2025 13:02):    No growth at 24 hours    Culture - Urine (collected 09 Jun 2025 03:27)  Source: Clean Catch Clean Catch (Midstream)  Preliminary Report (10 Noble 2025 08:40):    Culture positive, >100,000 CFU/ml . Identification to follow.

## 2025-06-11 NOTE — DISCHARGE NOTE PROVIDER - NSDCFUSCHEDAPPT_GEN_ALL_CORE_FT
[facility]  INTMED [id number]  Scheduled Appointment: 06/24/2025     [facility]  INTMED [id number]  Scheduled Appointment: 06/30/2025    [facility]  UROLOGY [address]  Scheduled Appointment: 07/14/2025

## 2025-06-11 NOTE — DISCHARGE NOTE PROVIDER - PROVIDER TOKENS
PROVIDER:[TOKEN:[[id number]],FOLLOWUP:[1 week]],PROVIDER:[TOKEN:[[id number]],FOLLOWUP:[1-3 days]],FREE:[LAST:[Endocrine],FIRST:[Specialty Practice],PHONE:[phone]],FAX:[(   )    -],ADDRESS:[Follow up at Endocrine Practice at [facility] [place]: [address] [place] [address]; Ph # [phone]],FOLLOWUP:[1 week]]

## 2025-06-11 NOTE — DISCHARGE NOTE PROVIDER - NSDCFUADDINST_GEN_ALL_CORE_FT
PLEASE FOLLOW UP WITH A DOCTOR AT [facility] [place] (YOU NEW PCP) AS THEY HAVE SPECIAL PROGRAMS TO GET YOU INSULIN FOR FREE (ASK ABOUT [facility])

## 2025-06-11 NOTE — DISCHARGE NOTE PROVIDER - NSFOLLOWUPCLINICS_GEN_ALL_ED_FT
[facility]  Urology  [address] [place] [address] - 3rd [place] [address]  Phone: [phone]  Fax:      [facility]  [facility] [address] [place] [address]  Phone: [phone]  Fax:     [facility] - Infectious Disease  Infectious Disease  [address], [place] [address]  Phone: [phone]  Fax:

## 2025-06-11 NOTE — DISCHARGE NOTE PROVIDER - CARE PROVIDER_API CALL
[facility]  Internal Medicine  [address] [place] [address]  Phone: [phone]  Fax: [phone]  Follow Up Time: 1 week    [facility]  Internal Medicine  [address] [place] [address]  Phone: [phone]  Fax: [phone]  Follow Up Time: 1-3 days    Endocrine, Specialty Practice  Follow up at Endocrine Practice at [facility] [place]: [address] [place] [address];  # [phone]  Phone: [phone]  Fax: (   )    -  Follow Up Time: 1 week

## 2025-06-11 NOTE — DISCHARGE NOTE PROVIDER - HOSPITAL COURSE
A 55-year-old male with a history of type 2 diabetes mellitus (DM2) and hypertension (HTN), both currently untreated, was admitted with difficulty urinating and was found to have sepsis secondary to a urinary tract infection (UTI) and uncontrolled DM2 marked by significant hyperglycemia. For the UTI-induced sepsis, his treatment includes ceftriaxone and cultures to better target the infection- he will be discharged on oral antibiotics. He also presented with urinary retention, which is being managed with a Ho catheter, and a trial of voiding planned for 6/11/25 to determine need for Ho Catheter on discharge. Notably, the patient's diabetes was unmanaged, resulting in a blood glucose level of 685 and an A1C of 13; he is now on a basal-bolus insulin regimen with plans to reintroduce metformin. His hypertension, exacerbated by urinary retention and untreated, is being addressed with enalapril. He is hemodynamically stable on day of discharge with outpatient follow up with PCP and Endocrine.    A 55-year-old male with a history of type 2 diabetes mellitus (DM2) and hypertension (HTN), both currently untreated, was admitted with difficulty urinating and was found to have sepsis secondary to a urinary tract infection (UTI) and uncontrolled DM2 marked by significant hyperglycemia.     Hospital course    # Sepsis 2/2 staph aureus UTI  # MSSA bacteremia  - Meets sepsis criteria with leukocytosis and tachycardia, pt also reports chills and diaphoresis at home though no fevers  - Lactate wnl  - BCX gram positive bacteremia, ucx + staph aureus  -> continued on ceftriaxone 1g (6/9-6/12  - started on cefazolin 2g q8 once mssa resulted  - f/u TTE  - ID consulted    # Urinary retention.   - Likely in setting of UTI, now s/p darnell catheter placed in ED  - passed TOV.    # Uncontrolled type 2 diabetes mellitus with hyperglycemia.   - Patient states he used to take metformin and an additional daily injection for his DM but stopped taking it around 2022 preferring instead to take herbal and natural supplements  - Here hyperglycemic to 685, A1C 13  - Endocrine consulted: recs appreciated - insulin adjusted daily    Discharge on basal/bolus insulin _______+ metformin 500 mg PO BID for 7 days then increase to 1000 mg PO BID  - pt unable to afford, team attempting to get funding for current medications and pt instructed to follow up with resident clinic for dispensary of home program.    # Essential hypertension.   - Not on medications prior to admission  - BP elevated, initially 176/100 in setting of urinary retention, after placement of darnell still elevated to 161/93  - Stared enalapril 5mg on admission, however bp still elevated so dose increased to Enalapril 10 mg. A 55-year-old male with a history of type 2 diabetes mellitus (DM2) and hypertension (HTN), both currently untreated, was admitted with difficulty urinating and was found to have sepsis secondary to a urinary tract infection (UTI) and uncontrolled DM2 marked by significant hyperglycemia.     Hospital course    # Sepsis 2/2 staph aureus UTI  # MSSA bacteremia  - Meets sepsis criteria with leukocytosis and tachycardia, pt also reports chills and diaphoresis at home though no fevers  - BCX gram positive bacteremia, ucx + staph aureus  - BCX gram positive bacteremia, ucx + staph aureus  -> continued on ceftriaxone 1g (6/9-6/12)  - started on cefazolin 2g q8 once mssa resulted  - TTE without any signs of endocarditis, will hold off on reva for now  - ID consulted, recs appreciated, f/u abx recs for d/c  - CT a/p showing possible scrotal abscess, obtain u/s  - scrotal u/s showing: Mild hyperemia of the left testis and mild prominence of the left epididymis with mild hypoechogenicity. Question left epididymoorchitis.  - urology consulted, recs appreciated, started on flomax and finasteride, IR consulted, at this time abscess too small to drain, cw treatment with abx for now, wbc continues to downtrend so d/c planning  - 6/14 bcx: no growth to date 48 hours.  - PICC line/midline placement for IV cefazolin until 7/11. PICC line placed on 6/19.    # Urinary retention.   - Likely in setting of UTI, now s/p darnell catheter placed in ED  - TOV failed x1 early in admission, and failed TOV again on 6/17  - pt to go home with darnell will need urology outpatient.      # Uncontrolled type 2 diabetes mellitus with hyperglycemia.   - Patient states he used to take metformin and an additional daily injection for his DM but stopped taking it around 2022 preferring instead to take herbal and natural supplements  - Here hyperglycemic to 685, A1C 13  - Endocrine consulted: recs appreciated - insulin adjusted daily    Discharge on basal/bolus insulin + metformin 500 mg PO BID for 7 days then increase to 1000 mg PO BID  - pt unable to afford, team attempting to get funding for current medications and pt instructed to follow up with resident clinic for dispensary of home program.    # Essential hypertension.   - Not on medications prior to admission  - BP elevated, initially 176/100 in setting of urinary retention, after placement of darnell still elevated to 161/93  - Stared enalapril 5mg on admission, however bp still elevated so dose increased to Enalapril 10 mg.

## 2025-06-11 NOTE — DISCHARGE NOTE PROVIDER - NSDCFUADDAPPT_GEN_ALL_CORE_FT
APPTS ARE READY TO BE MADE: [x] YES    Best Family or Patient Contact (if needed):    Additional Information about above appointments (if needed):    1: Follow up at Endocrine Practice at [facility] [place]: [address] [place] [address]; Ph # [phone]   2:   3:     Other comments or requests:    APPTS ARE READY TO BE MADE: [x] YES    Best Family or Patient Contact (if needed):    Additional Information about above appointments (if needed):    1: Follow up at Endocrine Practice at [facility] [place]: [address] [place] [address];  # [phone]   2:   3:     Other comments or requests:   Patient was outreached but did not answer. A voicemail was left for the patient to return our call.   APPTS ARE READY TO BE MADE: [x] YES    Best Family or Patient Contact (if needed):    Additional Information about above appointments (if needed):    1: Follow up at Endocrine Practice at [facility] [place]: [address] [place] [address];  # [phone]   2:   3:     Other comments or requests:     Patient still admitted, was outreached but did not answer. A voicemail was left for the patient to return our call. X2   APPTS ARE READY TO BE MADE: [x] YES    Best Family or Patient Contact (if needed):    Additional Information about above appointments (if needed):    1: Follow up at Endocrine Practice at [facility] [place]: [address] [place] [address];  # [phone]   2: Dr. Maia Pierre  3: ID    Other comments or requests:     Patient still admitted, was outreached but did not answer. A voicemail was left for the patient to return our call. X2   APPTS ARE READY TO BE MADE: [x] YES    Best Family or Patient Contact (if needed):    Additional Information about above appointments (if needed):    1: Follow up at Endocrine Practice at [facility] [place]: [address] [place] [address];  # [phone]   2: Dr. Maia Pierre  3: ID    Other comments or requests:     Patient still admitted, was outreached but did not answer. A voicemail was left for the patient to return our call. X3   APPTS ARE READY TO BE MADE: [x] YES    Best Family or Patient Contact (if needed):    Additional Information about above appointments (if needed):    1: Follow up at Endocrine Practice at Endocrine Clinic at [facility]: [address] [place] [address];  # [phone]   2: Dr. Maia Pierre  3: ID    Other comments or requests:   Appointment was scheduled in [place] Dr. Pierre on 6/24 at 10am    Setting as a callback and continue, so I have time to coordinate the patients appointments. I will follow up with the patient once the appointments have been secured. Uro, ID, Endo, PCP   APPTS ARE READY TO BE MADE: [x] YES    Best Family or Patient Contact (if needed):    Additional Information about above appointments (if needed):    1: Follow up at Endocrine Practice at Endocrine Clinic at [facility]: [address] [place] [address];  # [phone]   2: Dr. Maia Pierre  3: ID    Other comments or requests:   Appointment was scheduled in [place] Dr. Pierre on 6/24 at 10am    URO Appointment was scheduled in [facility]  on 7/14 at 8am sent task for sooner appt    ENDO Appointment was scheduled in [facility]  on 10/21 at 9am sent task for sooner appt    Prior to outreaching the patient, it was visible that the patient has secured a follow up appointment which was not scheduled by our team. Dr. Pettit on 6/30 at 3:30pm    Setting as a callback and continue, so I have time to coordinate the patients appointments. I will follow up with the patient once the appointments have been secured. ID   APPTS ARE READY TO BE MADE: [x] YES    Best Family or Patient Contact (if needed):    Additional Information about above appointments (if needed):    1: Follow up at Endocrine Practice at Endocrine Clinic at [facility]: [address] [place] [address];  # [phone]   2: Dr. Maia Pierre  3: ID    Other comments or requests:   Appointment was scheduled in [facility] Dr. Pierre on 6/24 at 10am    URO Appointment was scheduled in [facility]  on 7/14 at 8am sent task for sooner appt    ENDO Appointment was scheduled in [facility]  on 10/21 at 9am sent task for sooner appt    Prior to outreaching the patient, it was visible that the patient has secured a follow up appointment which was not scheduled by our team. Dr. Pettit on 6/30 at 3:30pm    Appointment was scheduled in [facility] Dr. Farfan on 7/3 AT 2:30PM

## 2025-06-11 NOTE — DISCHARGE NOTE PROVIDER - NSDCCPCAREPLAN_GEN_ALL_CORE_FT
PRINCIPAL DISCHARGE DIAGNOSIS  Diagnosis: Sepsis due to urinary tract infection  Assessment and Plan of Treatment: During your hospital stay, we identified that you have a urinary tract infection (UTI) that led to a condition called sepsis. This means the infection had spread to your bloodstream, which is why you were feeling very unwell. We have treated you with antibiotics to clear the infection and stabilize your condition.  Discharge Follow-up Instructions:  Antibiotic Medication: Continue taking the prescribed antibiotics at home. Ensure you complete the entire course, even if you start feeling better.  Monitor Symptoms: If you experience any fever, chills, increased heart rate, or any other concerns, please contact your healthcare provider immediately or go to the emergency room.  Follow-up Appointment: Schedule a follow-up appointment with your primary care physician within the next week to ensure the infection has fully resolved.  Hydration: Drink plenty of fluids to help flush the bacteria from your system.  Please return to the ED if you cannot urinate, or have decreased urination as it may be a sign of urinary retention.      SECONDARY DISCHARGE DIAGNOSES  Diagnosis: Uncontrolled type 2 diabetes mellitus with hyperglycemia  Assessment and Plan of Treatment: We also discovered that your diabetes is not under control, leading to very high blood sugar levels. This can lead to serious complications if not managed properly. You've been started on insulin and metformin to help control your blood sugar levels.  Discharge Follow-up Instructions:  Medication: You will be discharged on a basal/bolus insulin regimen and metformin. Take metformin 500 mg twice daily for one week, then increase to 1000 mg twice daily as directed.  Blood Sugar Monitoring: Check your blood sugar levels before meals and record them. Bring this record to your follow-up appointments.  Diet: Follow the carbohydrate-controlled (CC) diet as advised by the dietitian, focusing on healthy, balanced meals.  Exercise: Gradually increase your physical activity as tolerated, aiming for at least 30 minutes most days.  Endocrinologist Follow-Up: Schedule an appointment with an endocrinologist soon after discharge to adjust your diabetes management plan as needed.  Emergency Signs: If you experience symptoms of high blood sugar like excessive thirst, frequent urination, or fatigue, or symptoms of low blood sugar such as dizziness or confusion, seek medical attention immediately.     PRINCIPAL DISCHARGE DIAGNOSIS  Diagnosis: Sepsis due to urinary tract infection  Assessment and Plan of Treatment: During your hospital stay, we identified that you have a urinary tract infection (UTI) that led to a condition called sepsis. This means the infection had spread to your bloodstream, which is why you were feeling very unwell. We have treated you with antibiotics to clear the infection and stabilize your condition.  Discharge Follow-up Instructions:  Antibiotic Medication: Continue taking the prescribed antibiotics at home. Ensure you complete the entire course, even if you start feeling better.  Monitor Symptoms: If you experience any fever, chills, increased heart rate, or any other concerns, please contact your healthcare provider immediately or go to the emergency room.  Follow-up Appointment: Schedule a follow-up appointment with your primary care physician within the next week to ensure the infection has fully resolved.  Hydration: Drink plenty of fluids to help flush the bacteria from your system.  Please return to the ED if you cannot urinate, or have decreased urination as it may be a sign of urinary retention.      SECONDARY DISCHARGE DIAGNOSES  Diagnosis: Uncontrolled type 2 diabetes mellitus with hyperglycemia  Assessment and Plan of Treatment: We also discovered that your diabetes is not under control, leading to very high blood sugar levels. This can lead to serious complications if not managed properly. You've been started on insulin and metformin to help control your blood sugar levels.  Discharge Follow-up Instructions:  Medication: You will be discharged on a basal/bolus insulin regimen and metformin. Take metformin 500 mg twice daily for one week, then increase to 1000 mg twice daily as directed.  Blood Sugar Monitoring: Check your blood sugar levels before meals and record them. Bring this record to your follow-up appointments.  Diet: Follow the carbohydrate-controlled (CC) diet as advised by the dietitian, focusing on healthy, balanced meals.  Exercise: Gradually increase your physical activity as tolerated, aiming for at least 30 minutes most days.  Endocrinologist Follow-Up: Schedule an appointment with an endocrinologist soon after discharge to adjust your diabetes management plan as needed.  Emergency Signs: If you experience symptoms of high blood sugar like excessive thirst, frequent urination, or fatigue, or symptoms of low blood sugar such as dizziness or confusion, seek medical attention immediately.  PLEASE FOLLOW UP WITH A DOCTOR AT [facility] [place] (YOU NEW PCP) AS THEY HAVE SPECIAL PROGRAMS TO GET YOU INSULIN FOR FREE (ASK ABOUT [facility])     PRINCIPAL DISCHARGE DIAGNOSIS  Diagnosis: Sepsis due to urinary tract infection  Assessment and Plan of Treatment: During your hospital stay, we identified that you have a urinary tract infection (UTI) that led to a condition called sepsis. This means the infection had spread to your bloodstream, which is why you were feeling very unwell. We have treated you with antibiotics to clear the infection and stabilize your condition.  Discharge Follow-up Instructions:  Antibiotic Medication: Continue taking the prescribed antibiotics at home. Ensure you complete the entire course, even if you start feeling better.  Monitor Symptoms: If you experience any fever, chills, increased heart rate, or any other concerns, please contact your healthcare provider immediately or go to the emergency room.  Follow-up Appointment: Schedule a follow-up appointment with your primary care physician within the next week to ensure the infection has fully resolved.  Hydration: Drink plenty of fluids to help flush the bacteria from your system.  Please return to the ED if you cannot urinate, or have decreased urination as it may be a sign of urinary retention.      SECONDARY DISCHARGE DIAGNOSES  Diagnosis: Uncontrolled type 2 diabetes mellitus with hyperglycemia  Assessment and Plan of Treatment: We also discovered that your diabetes is not under control, leading to very high blood sugar levels. This can lead to serious complications if not managed properly. You've been started on insulin and metformin to help control your blood sugar levels.  Discharge Follow-up Instructions:  Medication: You will be discharged on a basal/bolus insulin regimen and metformin. Take metformin 500 mg twice daily for one week, then increase to 1000 mg twice daily as directed.  Blood Sugar Monitoring: Check your blood sugar levels before meals and record them. Bring this record to your follow-up appointments.  Diet: Follow the carbohydrate-controlled (CC) diet as advised by the dietitian, focusing on healthy, balanced meals.  Exercise: Gradually increase your physical activity as tolerated, aiming for at least 30 minutes most days.  Endocrinologist Follow-Up: Schedule an appointment with an endocrinologist soon after discharge to adjust your diabetes management plan as needed.  Emergency Signs: If you experience symptoms of high blood sugar like excessive thirst, frequent urination, or fatigue, or symptoms of low blood sugar such as dizziness or confusion, seek medical attention immediately.  PLEASE FOLLOW UP WITH A DOCTOR AT [facility] [place] (YOU NEW PCP) AS THEY HAVE SPECIAL PROGRAMS TO GET YOU INSULIN FOR FREE (ASK ABOUT [facility])    Diagnosis: Essential hypertension  Assessment and Plan of Treatment: You had elevatd blood pressure while you were in the hospital. We started a new medication called Enalapril for you. Continue to follow with your pcp.

## 2025-06-12 ENCOUNTER — RESULT REVIEW (OUTPATIENT)
Age: 55
End: 2025-06-12

## 2025-06-12 LAB
-  GENTAMICIN: SIGNIFICANT CHANGE UP
-  NITROFURANTOIN: SIGNIFICANT CHANGE UP
-  OXACILLIN: SIGNIFICANT CHANGE UP
-  RIFAMPIN: SIGNIFICANT CHANGE UP
-  TETRACYCLINE: SIGNIFICANT CHANGE UP
-  TRIMETHOPRIM/SULFAMETHOXAZOLE: SIGNIFICANT CHANGE UP
-  VANCOMYCIN: SIGNIFICANT CHANGE UP
ALBUMIN SERPL ELPH-MCNC: 3.1 G/DL — LOW (ref 3.3–5)
ALP SERPL-CCNC: 66 U/L — SIGNIFICANT CHANGE UP (ref 40–120)
ALT FLD-CCNC: 7 U/L — SIGNIFICANT CHANGE UP (ref 4–41)
ANION GAP SERPL CALC-SCNC: 12 MMOL/L — SIGNIFICANT CHANGE UP (ref 7–14)
AST SERPL-CCNC: 8 U/L — SIGNIFICANT CHANGE UP (ref 4–40)
BASOPHILS # BLD AUTO: 0.05 K/UL — SIGNIFICANT CHANGE UP (ref 0–0.2)
BASOPHILS NFR BLD AUTO: 0.4 % — SIGNIFICANT CHANGE UP (ref 0–2)
BILIRUB SERPL-MCNC: 0.9 MG/DL — SIGNIFICANT CHANGE UP (ref 0.2–1.2)
BUN SERPL-MCNC: 19 MG/DL — SIGNIFICANT CHANGE UP (ref 7–23)
CALCIUM SERPL-MCNC: 9.1 MG/DL — SIGNIFICANT CHANGE UP (ref 8.4–10.5)
CHLORIDE SERPL-SCNC: 96 MMOL/L — LOW (ref 98–107)
CO2 SERPL-SCNC: 24 MMOL/L — SIGNIFICANT CHANGE UP (ref 22–31)
CREAT SERPL-MCNC: 0.84 MG/DL — SIGNIFICANT CHANGE UP (ref 0.5–1.3)
CULTURE RESULTS: ABNORMAL
EGFR: 102 ML/MIN/1.73M2 — SIGNIFICANT CHANGE UP
EGFR: 102 ML/MIN/1.73M2 — SIGNIFICANT CHANGE UP
EOSINOPHIL # BLD AUTO: 0.09 K/UL — SIGNIFICANT CHANGE UP (ref 0–0.5)
EOSINOPHIL NFR BLD AUTO: 0.8 % — SIGNIFICANT CHANGE UP (ref 0–6)
GLUCOSE BLDC GLUCOMTR-MCNC: 150 MG/DL — HIGH (ref 70–99)
GLUCOSE BLDC GLUCOMTR-MCNC: 177 MG/DL — HIGH (ref 70–99)
GLUCOSE BLDC GLUCOMTR-MCNC: 231 MG/DL — HIGH (ref 70–99)
GLUCOSE BLDC GLUCOMTR-MCNC: 254 MG/DL — HIGH (ref 70–99)
GLUCOSE SERPL-MCNC: 244 MG/DL — HIGH (ref 70–99)
GRAM STN FLD: ABNORMAL
HCT VFR BLD CALC: 31.3 % — LOW (ref 39–50)
HGB BLD-MCNC: 10.7 G/DL — LOW (ref 13–17)
IANC: 8.15 K/UL — HIGH (ref 1.8–7.4)
IMM GRANULOCYTES NFR BLD AUTO: 0.8 % — SIGNIFICANT CHANGE UP (ref 0–0.9)
LYMPHOCYTES # BLD AUTO: 2.71 K/UL — SIGNIFICANT CHANGE UP (ref 1–3.3)
LYMPHOCYTES # BLD AUTO: 22.8 % — SIGNIFICANT CHANGE UP (ref 13–44)
MAGNESIUM SERPL-MCNC: 2 MG/DL — SIGNIFICANT CHANGE UP (ref 1.6–2.6)
MCHC RBC-ENTMCNC: 28.5 PG — SIGNIFICANT CHANGE UP (ref 27–34)
MCHC RBC-ENTMCNC: 34.2 G/DL — SIGNIFICANT CHANGE UP (ref 32–36)
MCV RBC AUTO: 83.2 FL — SIGNIFICANT CHANGE UP (ref 80–100)
METHOD TYPE: SIGNIFICANT CHANGE UP
METHOD TYPE: SIGNIFICANT CHANGE UP
MONOCYTES # BLD AUTO: 0.78 K/UL — SIGNIFICANT CHANGE UP (ref 0–0.9)
MONOCYTES NFR BLD AUTO: 6.6 % — SIGNIFICANT CHANGE UP (ref 2–14)
MRSA PCR RESULT.: SIGNIFICANT CHANGE UP
MSSA DNA SPEC QL NAA+PROBE: SIGNIFICANT CHANGE UP
NEUTROPHILS # BLD AUTO: 8.15 K/UL — HIGH (ref 1.8–7.4)
NEUTROPHILS NFR BLD AUTO: 68.6 % — SIGNIFICANT CHANGE UP (ref 43–77)
NRBC # BLD AUTO: 0 K/UL — SIGNIFICANT CHANGE UP (ref 0–0)
NRBC # FLD: 0 K/UL — SIGNIFICANT CHANGE UP (ref 0–0)
NRBC BLD AUTO-RTO: 0 /100 WBCS — SIGNIFICANT CHANGE UP (ref 0–0)
ORGANISM # SPEC MICROSCOPIC CNT: ABNORMAL
ORGANISM # SPEC MICROSCOPIC CNT: ABNORMAL
PHOSPHATE SERPL-MCNC: 4.2 MG/DL — SIGNIFICANT CHANGE UP (ref 2.5–4.5)
PLATELET # BLD AUTO: 339 K/UL — SIGNIFICANT CHANGE UP (ref 150–400)
POTASSIUM SERPL-MCNC: 3.6 MMOL/L — SIGNIFICANT CHANGE UP (ref 3.5–5.3)
POTASSIUM SERPL-SCNC: 3.6 MMOL/L — SIGNIFICANT CHANGE UP (ref 3.5–5.3)
PROT SERPL-MCNC: 7.1 G/DL — SIGNIFICANT CHANGE UP (ref 6–8.3)
RBC # BLD: 3.76 M/UL — LOW (ref 4.2–5.8)
RBC # FLD: 11.8 % — SIGNIFICANT CHANGE UP (ref 10.3–14.5)
S AUREUS DNA NOSE QL NAA+PROBE: SIGNIFICANT CHANGE UP
SODIUM SERPL-SCNC: 132 MMOL/L — LOW (ref 135–145)
SPECIMEN SOURCE: SIGNIFICANT CHANGE UP
WBC # BLD: 11.88 K/UL — HIGH (ref 3.8–10.5)
WBC # FLD AUTO: 11.88 K/UL — HIGH (ref 3.8–10.5)

## 2025-06-12 PROCEDURE — 71045 X-RAY EXAM CHEST 1 VIEW: CPT | Mod: 26

## 2025-06-12 PROCEDURE — 99232 SBSQ HOSP IP/OBS MODERATE 35: CPT

## 2025-06-12 PROCEDURE — 99233 SBSQ HOSP IP/OBS HIGH 50: CPT

## 2025-06-12 PROCEDURE — 74177 CT ABD & PELVIS W/CONTRAST: CPT | Mod: 26

## 2025-06-12 PROCEDURE — 93356 MYOCRD STRAIN IMG SPCKL TRCK: CPT

## 2025-06-12 PROCEDURE — 93306 TTE W/DOPPLER COMPLETE: CPT | Mod: 26

## 2025-06-12 PROCEDURE — 76376 3D RENDER W/INTRP POSTPROCES: CPT | Mod: 26

## 2025-06-12 PROCEDURE — 99223 1ST HOSP IP/OBS HIGH 75: CPT | Mod: GC

## 2025-06-12 RX ORDER — CEFAZOLIN SODIUM IN 0.9 % NACL 3 G/100 ML
2000 INTRAVENOUS SOLUTION, PIGGYBACK (ML) INTRAVENOUS EVERY 8 HOURS
Refills: 0 | Status: COMPLETED | OUTPATIENT
Start: 2025-06-12 | End: 2025-06-19

## 2025-06-12 RX ORDER — INSULIN GLARGINE-YFGN 100 [IU]/ML
23 INJECTION, SOLUTION SUBCUTANEOUS
Refills: 0 | Status: DISCONTINUED | OUTPATIENT
Start: 2025-06-13 | End: 2025-06-13

## 2025-06-12 RX ORDER — INSULIN LISPRO 100 U/ML
7 INJECTION, SOLUTION INTRAVENOUS; SUBCUTANEOUS
Refills: 0 | Status: DISCONTINUED | OUTPATIENT
Start: 2025-06-12 | End: 2025-06-13

## 2025-06-12 RX ADMIN — Medication 100 MILLIGRAM(S): at 22:30

## 2025-06-12 RX ADMIN — Medication 100 MILLIGRAM(S): at 05:54

## 2025-06-12 RX ADMIN — INSULIN LISPRO 7 UNIT(S): 100 INJECTION, SOLUTION INTRAVENOUS; SUBCUTANEOUS at 17:35

## 2025-06-12 RX ADMIN — Medication 10 MILLIGRAM(S): at 05:53

## 2025-06-12 RX ADMIN — Medication 1 APPLICATION(S): at 11:27

## 2025-06-12 RX ADMIN — CEFTRIAXONE 100 MILLIGRAM(S): 500 INJECTION, POWDER, FOR SOLUTION INTRAMUSCULAR; INTRAVENOUS at 03:13

## 2025-06-12 RX ADMIN — Medication 100 MILLIGRAM(S): at 14:54

## 2025-06-12 RX ADMIN — INSULIN LISPRO 2: 100 INJECTION, SOLUTION INTRAVENOUS; SUBCUTANEOUS at 09:09

## 2025-06-12 RX ADMIN — INSULIN GLARGINE-YFGN 19 UNIT(S): 100 INJECTION, SOLUTION SUBCUTANEOUS at 11:27

## 2025-06-12 RX ADMIN — INSULIN LISPRO 3: 100 INJECTION, SOLUTION INTRAVENOUS; SUBCUTANEOUS at 12:21

## 2025-06-12 RX ADMIN — INSULIN LISPRO 6 UNIT(S): 100 INJECTION, SOLUTION INTRAVENOUS; SUBCUTANEOUS at 09:10

## 2025-06-12 RX ADMIN — Medication 100 MILLIGRAM(S): at 12:37

## 2025-06-12 RX ADMIN — INSULIN LISPRO 6 UNIT(S): 100 INJECTION, SOLUTION INTRAVENOUS; SUBCUTANEOUS at 12:22

## 2025-06-12 NOTE — CONSULT NOTE ADULT - ASSESSMENT
Patient is a 55 year old male with PMH of DM2 and HTN who presents for urinary sx. Patient states for a little over a week he has been experiencing dysuria, hesitancy, urgency associated with suprapubic abdominal pain. No fevers, chills or flank pain. No recent dental work, prosthetic joints/devices.     On presentation, vitals notable for tachycardia.   Labs notable for leukocytosis to 15.10, CMP with hyperglycemia to 685. A1C 13.     Infectious workup/imaging:   -U/A with >4000 WBCs and urine culture with MSSA.   -1/4 blood cultures with MSSA.     Abx:   Cefazolin (6/12-)   -s/p Ceftriaxone ( 6/9-6/12)    #UTI with MSSA in urine   #MSSA bacteremia   #uncontrolled diabetes   -continue with Cefazolin 2 g IVPB q8h   -obtain TTE   -obtain CT abdomen pelvis to evaluate for  pathology for source of bacteremia (i.e. prostatic abscess)   -obtain CXR   -obtain 2 sets of blood cultures   -f/u all culture data  -monitor WBC and fever curve     Case seen and discussed with Dr. Gonzalez who agrees with assessment and plan. Note not final until attending addendum.

## 2025-06-12 NOTE — PROGRESS NOTE ADULT - SUBJECTIVE AND OBJECTIVE BOX
[facility] Division of [facility] Medicine  Samuelnegritaobinna Ortiz DO  Available via MS Teams  Pager: [phone]    Patient is a 55y old  Male who presents with a chief complaint of Urinary retention, Sepsis due to UTI, Uncontrolled DM2 (12 Jun 2025 12:18)      SUBJECTIVE / OVERNIGHT EVENTS:    Pt seen and examined this morning. Pt denies dental abscesses/pain, any bodily wounds, prior IV drug use.     ADDITIONAL REVIEW OF SYSTEMS:  No Fever, chills, chest pain, shortness of breath.     MEDICATIONS  (STANDING):  ceFAZolin   IVPB 2000 milliGRAM(s) IV Intermittent every 8 hours  chlorhexidine 2% Cloths 1 Application(s) Topical daily  dextrose 5%. 1000 milliLiter(s) (100 mL/Hr) IV Continuous <Continuous>  dextrose 5%. 1000 milliLiter(s) (50 mL/Hr) IV Continuous <Continuous>  dextrose 50% Injectable 25 Gram(s) IV Push once  dextrose 50% Injectable 12.5 Gram(s) IV Push once  dextrose 50% Injectable 25 Gram(s) IV Push once  enalapril 10 milliGRAM(s) Oral daily  glucagon  Injectable 1 milliGRAM(s) IntraMuscular once  insulin glargine Injectable (LANTUS) 19 Unit(s) SubCutaneous <User Schedule>  insulin lispro (ADMELOG) corrective regimen sliding scale   SubCutaneous three times a day before meals  insulin lispro (ADMELOG) corrective regimen sliding scale   SubCutaneous at bedtime  insulin lispro Injectable (ADMELOG) 6 Unit(s) SubCutaneous three times a day before meals  phenazopyridine 100 milliGRAM(s) Oral every 8 hours    MEDICATIONS  (PRN):  acetaminophen     Tablet .. 650 milliGRAM(s) Oral every 6 hours PRN Temp greater or equal to 38C (100.4F), Mild Pain (1 - 3)  dextrose Oral Gel 15 Gram(s) Oral once PRN Blood Glucose LESS THAN 70 milliGRAM(s)/deciliter      I&O's Summary    11 Jun 2025 07:01  -  12 Jun 2025 07:00  --------------------------------------------------------  IN: 960 mL / OUT: 1230 mL / NET: -270 mL        PHYSICAL EXAM:  Vital Signs Last 24 Hrs  T(C): 37.1 (12 Jun 2025 06:33), Max: 37.1 (11 Jun 2025 23:19)  T(F): 98.7 (12 Jun 2025 06:33), Max: 98.8 (11 Jun 2025 23:19)  HR: 97 (12 Jun 2025 06:33) (87 - 97)  BP: 170/98 (12 Jun 2025 06:33) (142/94 - 174/101)  BP(mean): --  RR: 17 (12 Jun 2025 06:33) (17 - 18)  SpO2: 100% (12 Jun 2025 06:33) (98% - 100%)    Parameters below as of 12 Jun 2025 06:33  Patient On (Oxygen Delivery Method): room air      CONSTITUTIONAL: NAD  EYES: PERRLA  ENMT: Moist oral mucosa  RESPIRATORY: Normal respiratory effort; lungs are clear to auscultation bilaterally  CARDIOVASCULAR: Regular rate and rhythm, normal S1 and S2, no murmur/rub/gallop  ABDOMEN: Nontender to palpation, normoactive bowel sounds  PSYCH: A+O to person, place, and time  NEUROLOGY: CN 2-12 are intact and symmetric; no gross sensory deficits     LABS:                        10.7   11.88 )-----------( 339      ( 12 Jun 2025 06:53 )             31.3     06-12    132[L]  |  96[L]  |  19  ----------------------------<  244[H]  3.6   |  24  |  0.84    Ca    9.1      12 Jun 2025 06:53  Phos  4.2     06-12  Mg     2.00     06-12    TPro  7.1  /  Alb  3.1[L]  /  TBili  0.9  /  DBili  x   /  AST  8   /  ALT  7   /  AlkPhos  66  06-12          Urinalysis Basic - ( 12 Jun 2025 06:53 )    Color: x / Appearance: x / SG: x / pH: x  Gluc: 244 mg/dL / Ketone: x  / Bili: x / Urobili: x   Blood: x / Protein: x / Nitrite: x   Leuk Esterase: x / RBC: x / WBC x   Sq Epi: x / Non Sq Epi: x / Bacteria: x        Culture - Blood (collected 10 Noble 2025 09:47)  Source: Blood Blood-Peripheral  Gram Stain (12 Jun 2025 04:49):    Growth in anaerobic bottle: Gram Positive Cocci in Clusters  Preliminary Report (12 Jun 2025 04:49):    Growth in anaerobic bottle: Gram Positive Cocci in Clusters    Direct identification is available within approximately 3-5    hours either by Blood Panel Multiplexed PCR or Direct    MALDI-TOF. Details: [email or web address]  Organism: Blood Culture PCR (12 Jun 2025 06:34)  Organism: Blood Culture PCR (12 Jun 2025 06:34)    Culture - Blood (collected 10 Noble 2025 09:40)  Source: Blood Blood-Peripheral  Preliminary Report (12 Jun 2025 13:02):    No growth at 48 Hours

## 2025-06-12 NOTE — PROGRESS NOTE ADULT - SUBJECTIVE AND OBJECTIVE BOX
Chief Complaint: type 2 DM    History: Tolerating PO diet. pt stated he had a half turkey sandwich late last night. FS above goal today.    MEDICATIONS  (STANDING):  ceFAZolin   IVPB 2000 milliGRAM(s) IV Intermittent every 8 hours  chlorhexidine 2% Cloths 1 Application(s) Topical daily  dextrose 5%. 1000 milliLiter(s) (100 mL/Hr) IV Continuous <Continuous>  dextrose 5%. 1000 milliLiter(s) (50 mL/Hr) IV Continuous <Continuous>  dextrose 50% Injectable 25 Gram(s) IV Push once  dextrose 50% Injectable 12.5 Gram(s) IV Push once  dextrose 50% Injectable 25 Gram(s) IV Push once  enalapril 10 milliGRAM(s) Oral daily  glucagon  Injectable 1 milliGRAM(s) IntraMuscular once  insulin lispro (ADMELOG) corrective regimen sliding scale   SubCutaneous three times a day before meals  insulin lispro (ADMELOG) corrective regimen sliding scale   SubCutaneous at bedtime  insulin lispro Injectable (ADMELOG) 7 Unit(s) SubCutaneous three times a day before meals  phenazopyridine 100 milliGRAM(s) Oral every 8 hours    MEDICATIONS  (PRN):  acetaminophen     Tablet .. 650 milliGRAM(s) Oral every 6 hours PRN Temp greater or equal to 38C (100.4F), Mild Pain (1 - 3)  dextrose Oral Gel 15 Gram(s) Oral once PRN Blood Glucose LESS THAN 70 milliGRAM(s)/deciliter      Allergies    No Known Allergies    Intolerances      Review of Systems:  Cardiovascular: No chest pain, palpitations  Respiratory: No SOB, no cough  GI: No nausea, vomiting, abdominal pain  : No dysuria  Endocrine: no polyuria, polydipsia      PHYSICAL EXAM:  VITALS: T(C): 37.1 (06-12-25 @ 06:33)  T(F): 98.7 (06-12-25 @ 06:33), Max: 98.8 (06-11-25 @ 23:19)  HR: 97 (06-12-25 @ 06:33) (87 - 97)  BP: 170/98 (06-12-25 @ 06:33) (142/94 - 174/101)  RR:  (17 - 18)  SpO2:  (98% - 100%)  Wt(kg): --  GENERAL: NAD, well-groomed, well-developed  EYES: No proptosis  HEENT:  Atraumatic, Normocephalic  RESPIRATORY: non labored breathing   CARDIOVASCULAR: Regular rate and rhythm  GI: Soft, nontender, non distended  PSYCH: Alert and oriented x 3, normal affect, normal mood       CAPILLARY BLOOD GLUCOSE      POCT Blood Glucose.: 254 mg/dL (12 Jun 2025 11:26)  POCT Blood Glucose.: 231 mg/dL (12 Jun 2025 08:53)  POCT Blood Glucose.: 251 mg/dL (11 Jun 2025 21:14)  POCT Blood Glucose.: 200 mg/dL (11 Jun 2025 17:44)      06-12    132[L]  |  96[L]  |  19  ----------------------------<  244[H]  3.6   |  24  |  0.84    eGFR: 102    Ca    9.1      06-12  Mg     2.00     06-12  Phos  4.2     06-12    TPro  7.1  /  Alb  3.1[L]  /  TBili  0.9  /  DBili  x   /  AST  8   /  ALT  7   /  AlkPhos  66  06-12          Thyroid Function Tests:        A1C with Estimated Average Glucose Result: 13.0 % (06-09-25 @ 03:29)          Diet, Regular:   Consistent Carbohydrate Evening Snack (CSTCHOSN)  DASH/TLC Sodium & Cholesterol Restricted (DASH) (06-09-25 @ 07:12)

## 2025-06-12 NOTE — CONSULT NOTE ADULT - TIME BILLING
Reviewing the chart, interpreting lab data, discussing case with fellow, interview and examination of patient, and documentation.

## 2025-06-12 NOTE — PROGRESS NOTE ADULT - ASSESSMENT
55M with history of DM2 and HTN (not on medications currently for either) who presents to the hospital with complaints of difficulty urinating since last night. Said that for the past 1 week he has been having urinary frequency, urgency, and dysuria. Had some hematuria initially with these symptoms but that resolved soon after it had started. Was also having chills and diaphoresis but no fevers and therefore did not seek medical attention. Said that since last night he would feel the urge to urinate but would only dribble a little and have a sensation of incomplete voiding. This continued for a while and he started to have pelvic pain and discomfort and therefore came to the hospital. Here had a darnell placed with almost immediate relief in his symptoms. Currently states he has some mild abdominal pain and had 1 episode of NBNB emesis in the ED. Also reports having chronic peripheral neuropathy x years of his feet. Otherwise denies any acute complaints. Endocrine consulted for uncontrolled diabetes, A1c 13.    Endocrine history:   DM dx: diagnosed with type 2 diabetes 5 to 6 years ago, thinks his A1c back then was 10  Medications: metformin 500 mg twice daily – stopped 2 to 3 years ago. Patient states that metformin was not working and was not doing anything for his diabetes since he was still hyperglycemic at home.  Fingersticks/CGM: Does not use CGM.  When he was checking his fingerstick, it was usually 200-300s.  He stopped checking his fingerstick a long time ago.   Diet: Patient stated that he tries to control his diabetes with diet.  He eats a lot of vegetables and makes his own juice–papaya.  He eats rice, bread.  Endocrinologist: None  Ophthalmologist evaluation: Last eye exam was 3 to 4 years ago.  Patient currently unemployed.  He has no medical insurance.  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  Patient not in DKA.     Inpatient plan   - FS goal 100-180 mg/dl   - FS above goal   - Increase Lantus to 23 units daily, moved to 1pm tomorrow. Will move Lantus 2 hours each day until bedtime reached  - Increase Admelog to 7 units TID AC.  Hold Admelog if not eating/NPO.  - continue low Admelog correctional scale TID AC  - continue separate low Admelog correctional scale at HS  - FS TID AC & HS ---> q6 if NPO   - hypoglycemia protocol PRN   - consistent carbohydrate diet  - RD consult  - provider to RN - insulin pen teaching    Discharge plan  - Patient uninsured.  - Discussed with patient regarding basal/bolus insulin at home.  Patient tentatively agreed to using insulin, stating "I will try my best".  - Discharge on Basaglar 21 units daily - can move 2 hours each day to get to bedtime dosing + Humalog 6 units premeal TID + metformin 500 mg PO BID for 7 days then increase to 1000 mg PO BID.   - Please send prescription for Basaglar pen + Humalog pen  Coupon code:  LV: [id number]  Group: [id number]  Vein: [id number]  PCN: SSN  - please send prescription for glucometer and supplies - test strip, lancets, alcohol pads and insulin pen needles.   - Please send prescription for glucose tablets 4G (take 4 tablets) or 15G tablets for blood sugar less than 70 mG/dL, repeat fingerstick in 15 minutes. Call your provider for dose adjustment if needed.   - Follow up at [facility] [place]: [address] [place] [address]; Ph # [phone]     HTN  - outpatient BP goal <130/80   - continue enalapril   - management per primary team     HLD   - LDL goal <70   - Check lipid panel if not done recently   - management per primary team       CHERYL Lao-BC  Nurse Practitioner  Division of Endocrinology & Diabetes  Available on Microsoft Teams      55M with history of DM2 and HTN (not on medications currently for either) who presents to the hospital with complaints of difficulty urinating since last night. Said that for the past 1 week he has been having urinary frequency, urgency, and dysuria. Had some hematuria initially with these symptoms but that resolved soon after it had started. Was also having chills and diaphoresis but no fevers and therefore did not seek medical attention. Said that since last night he would feel the urge to urinate but would only dribble a little and have a sensation of incomplete voiding. This continued for a while and he started to have pelvic pain and discomfort and therefore came to the hospital. Here had a darnell placed with almost immediate relief in his symptoms. Currently states he has some mild abdominal pain and had 1 episode of NBNB emesis in the ED. Also reports having chronic peripheral neuropathy x years of his feet. Otherwise denies any acute complaints. Endocrine consulted for uncontrolled diabetes, A1c 13.    Endocrine history:   DM dx: diagnosed with type 2 diabetes 5 to 6 years ago, thinks his A1c back then was 10  Medications: metformin 500 mg twice daily – stopped 2 to 3 years ago. Patient states that metformin was not working and was not doing anything for his diabetes since he was still hyperglycemic at home.  Fingersticks/CGM: Does not use CGM.  When he was checking his fingerstick, it was usually 200-300s.  He stopped checking his fingerstick a long time ago.   Diet: Patient stated that he tries to control his diabetes with diet.  He eats a lot of vegetables and makes his own juice–papaya.  He eats rice, bread.  Endocrinologist: None  Ophthalmologist evaluation: Last eye exam was 3 to 4 years ago.  Patient currently unemployed.  He has no medical insurance.  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  Patient not in DKA.     Inpatient plan   - FS goal 100-180 mg/dl   - FS above goal   - Continue Lantus 19 units daily, moved to 3pm tomorrow. Will move Lantus 2 hours each day until bedtime reached  - Increase Admelog to 7 units TID AC.  Hold Admelog if not eating/NPO.  - continue low Admelog correctional scale TID AC  - continue separate low Admelog correctional scale at HS  - FS TID AC & HS ---> q6 if NPO   - hypoglycemia protocol PRN   - consistent carbohydrate diet  - RD consult  - provider to RN - insulin pen teaching    Discharge plan  - Patient uninsured.  - Discussed with patient regarding basal/bolus insulin at home.  Patient tentatively agreed to using insulin, stating "I will try my best".  - Discharge on Basaglar 21 units daily - can move 2 hours each day to get to bedtime dosing + Humalog 6 units premeal TID + metformin 500 mg PO BID for 7 days then increase to 1000 mg PO BID.   - Please send prescription for Basaglar pen + Humalog pen  Coupon code:  LV: [id number]  Group: [id number]  Vein: [id number]  PCN: SSN  - please send prescription for glucometer and supplies - test strip, lancets, alcohol pads and insulin pen needles.   - Please send prescription for glucose tablets 4G (take 4 tablets) or 15G tablets for blood sugar less than 70 mG/dL, repeat fingerstick in 15 minutes. Call your provider for dose adjustment if needed.   - Follow up at [facility]: [address] [place] [address]; Ph # [phone]     HTN  - outpatient BP goal <130/80   - continue enalapril   - management per primary team     HLD   - LDL goal <70   - Check lipid panel if not done recently   - management per primary team       CHERYL Lao-BC  Nurse Practitioner  Division of Endocrinology & Diabetes  Available on Microsoft Teams      55M with history of DM2 and HTN (not on medications currently for either) who presents to the hospital with complaints of difficulty urinating since last night. Said that for the past 1 week he has been having urinary frequency, urgency, and dysuria. Had some hematuria initially with these symptoms but that resolved soon after it had started. Was also having chills and diaphoresis but no fevers and therefore did not seek medical attention. Said that since last night he would feel the urge to urinate but would only dribble a little and have a sensation of incomplete voiding. This continued for a while and he started to have pelvic pain and discomfort and therefore came to the hospital. Here had a darnell placed with almost immediate relief in his symptoms. Currently states he has some mild abdominal pain and had 1 episode of NBNB emesis in the ED. Also reports having chronic peripheral neuropathy x years of his feet. Otherwise denies any acute complaints. Endocrine consulted for uncontrolled diabetes, A1c 13.    Endocrine history:   DM dx: diagnosed with type 2 diabetes 5 to 6 years ago, thinks his A1c back then was 10  Medications: metformin 500 mg twice daily – stopped 2 to 3 years ago. Patient states that metformin was not working and was not doing anything for his diabetes since he was still hyperglycemic at home.  Fingersticks/CGM: Does not use CGM.  When he was checking his fingerstick, it was usually 200-300s.  He stopped checking his fingerstick a long time ago.   Diet: Patient stated that he tries to control his diabetes with diet.  He eats a lot of vegetables and makes his own juice–papaya.  He eats rice, bread.  Endocrinologist: None  Ophthalmologist evaluation: Last eye exam was 3 to 4 years ago.  Patient currently unemployed.  He has no medical insurance.  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  Patient not in DKA.     Inpatient plan   - FS goal 100-180 mg/dl   - FS above goal   - Increase Lantus to 23 units daily, moved to 3pm tomorrow. Will move Lantus 2 hours each day until bedtime reached  - Increase Admelog to 7 units TID AC.  Hold Admelog if not eating/NPO.  - continue low Admelog correctional scale TID AC  - continue separate low Admelog correctional scale at HS  - FS TID AC & HS ---> q6 if NPO   - hypoglycemia protocol PRN   - consistent carbohydrate diet  - RD consult  - provider to RN - insulin pen teaching    Discharge plan  - Patient uninsured.  - Discussed with patient regarding basal/bolus insulin at home.  Patient tentatively agreed to using insulin, stating "I will try my best".  - Discharge on Basaglar 21 units daily - can move 2 hours each day to get to bedtime dosing + Humalog 6 units premeal TID + metformin 500 mg PO BID for 7 days then increase to 1000 mg PO BID.   - Please send prescription for Basaglar pen + Humalog pen  Coupon code:  LV: [id number]  Group: [id number]  Vein: [id number]  PCN: SSN  - please send prescription for glucometer and supplies - test strip, lancets, alcohol pads and insulin pen needles.   - Please send prescription for glucose tablets 4G (take 4 tablets) or 15G tablets for blood sugar less than 70 mG/dL, repeat fingerstick in 15 minutes. Call your provider for dose adjustment if needed.   - Follow up at [facility] [place]: [address] [place] [address]; Ph # [phone]     HTN  - outpatient BP goal <130/80   - continue enalapril   - management per primary team     HLD   - LDL goal <70   - Check lipid panel if not done recently   - management per primary team       CHERYL Lao-BC  Nurse Practitioner  Division of Endocrinology & Diabetes  Available on [employer] Teams

## 2025-06-12 NOTE — CONSULT NOTE ADULT - SUBJECTIVE AND OBJECTIVE BOX
Patient is a 55 year old male with PMH of DM2 and HTN who presents for urinary sx. Patient states for a little over a week he has been experiencing dysuria, hesitancy, urgency associated with suprapubic abdominal pain. No fevers, chills or flank pain but endorses sweats when he tries to go to the bathroom as it is very difficult to urinate. Never had sx like this before and denies ever being treated for a UTI. No recent dental work, prosthetic joints/devices.     In the ER:   Vitals notable for tachycardia.   Labs: CBC with leukcoytosis to 15.10 and CMP notable for hyperglycemia with serum glucose 685. A1C 13. U/A with >4000 WBCs and urine culture with MSSA. 1/4 blood cultures with MSSA. Patient states his urinary sx were intiially improved when he had the darnell placed but since removal yesterday, he has had worsening sx again.     Abx:   Cefazolin (6/12-)   -s/p Ceftriaxone ( 6/9-6/12)     REVIEW OF SYSTEMS  Constitutional: No fevers, No chills  Respiratory: No cough, no SOB  Cardiovascular:  No chest pain, No palpitations   Gastrointestinal: + pain, + nausea, No vomiting, No diarrhea, No constipation	  Genitourinary: + dysuria, No frequency, + hesitancy, No flank pain  MSK: No Joint pain, No back pain, No edema  Neurological: No HA, no weakness, no seizures, no AMS     prior hospital charts reviewed [V]  primary team notes reviewed [V]  other consultant notes reviewed [V]    PAST MEDICAL & SURGICAL HISTORY:  Type 2 diabetes mellitus      Essential hypertension      Umbilical hernia          SOCIAL HISTORY:  Denied smoking/vaping/alcohol/recreational drug use    FAMILY HISTORY:  FH: CAD (coronary artery disease) (Father)    Family history of CVA (Father, Sibling, Uncle)        Allergies  No Known Allergies        ANTIMICROBIALS:  ceFAZolin   IVPB 2000 every 8 hours      ANTIMICROBIALS (past 90 days):  MEDICATIONS  (STANDING):    cefTRIAXone   IVPB   100 mL/Hr IV Intermittent (06-12-25 @ 03:13)   100 mL/Hr IV Intermittent (06-11-25 @ 02:57)   100 mL/Hr IV Intermittent (06-10-25 @ 03:57)    cefTRIAXone   IVPB   100 mL/Hr IV Intermittent (06-09-25 @ 04:43)        OTHER MEDS:   MEDICATIONS  (STANDING):  acetaminophen     Tablet .. 650 every 6 hours PRN  dextrose 50% Injectable 25 once  dextrose 50% Injectable 12.5 once  dextrose 50% Injectable 25 once  dextrose Oral Gel 15 once PRN  enalapril 10 daily  glucagon  Injectable 1 once  insulin glargine Injectable (LANTUS) 19 <User Schedule>  insulin lispro (ADMELOG) corrective regimen sliding scale  three times a day before meals  insulin lispro (ADMELOG) corrective regimen sliding scale  at bedtime  insulin lispro Injectable (ADMELOG) 6 three times a day before meals  phenazopyridine 100 every 8 hours      VITALS:  Vital Signs Last 24 Hrs  T(F): 98.7 (06-12-25 @ 06:33), Max: 99.1 (06-09-25 @ 21:30)    Vital Signs Last 24 Hrs  HR: 97 (06-12-25 @ 06:33) (87 - 97)  BP: 170/98 (06-12-25 @ 06:33) (142/94 - 174/101)  RR: 17 (06-12-25 @ 06:33)  SpO2: 100% (06-12-25 @ 06:33) (98% - 100%)  Wt(kg): --    EXAM:  General: Patient appears comfortable, no acute distress  HEENT: NCAT, PERRL, anicteric sclera, mucous membranes moist and intact  CV: +S1/S2, +tachycardic   Lungs: No respiratory distress, CTA b/l, no wheezing, rales or rhonchi  Abd:  BS4+, Soft, NTND, no guarding  : + suprapubic tenderness  Neuro: AAOx3. No focal deficits noted.   Ext: No cyanosis, no edema  Msk: freely moving upper and lower extremities  Skin: No rash, no phlebitis, No erythema       Labs:                        10.7   11.88 )-----------( 339      ( 12 Jun 2025 06:53 )             31.3     06-12    132[L]  |  96[L]  |  19  ----------------------------<  244[H]  3.6   |  24  |  0.84    Ca    9.1      12 Jun 2025 06:53  Phos  4.2     06-12  Mg     2.00     06-12    TPro  7.1  /  Alb  3.1[L]  /  TBili  0.9  /  DBili  x   /  AST  8   /  ALT  7   /  AlkPhos  66  06-12      WBC Trend:  WBC Count: 11.88 (06-12-25 @ 06:53)  WBC Count: 12.69 (06-11-25 @ 04:10)  WBC Count: 17.32 (06-10-25 @ 03:56)  WBC Count: 17.59 (06-09-25 @ 11:00)          Creatine Trend:  Creatinine: 0.84 (06-12)  Creatinine: 0.95 (06-11)  Creatinine: 0.83 (06-10)  Creatinine: 0.89 (06-09)      Liver Biochemical Testing Trend:  Alanine Aminotransferase (ALT/SGPT): 7 (06-12)  Alanine Aminotransferase (ALT/SGPT): 8 (06-09)  Alanine Aminotransferase (ALT/SGPT): 9 (06-09)  Aspartate Aminotransferase (AST/SGOT): 8 (06-12-25 @ 06:53)  Aspartate Aminotransferase (AST/SGOT): 10 (06-09-25 @ 11:00)  Aspartate Aminotransferase (AST/SGOT): 13 (06-09-25 @ 03:29)  Bilirubin Total: 0.9 (06-12)  Bilirubin Total: 0.4 (06-09)  Bilirubin Total: 0.6 (06-09)      Trend LDH          MICROBIOLOGY:    MRSA PCR Result.: NotDetec (06-11-25 @ 16:09)      Culture - Blood (collected 10 Noble 2025 09:47)  Source: Blood Blood-Peripheral  Preliminary Report:    Growth in anaerobic bottle: Gram Positive Cocci in Clusters    Direct identification is available within approximately 3-5    hours either by Blood Panel Multiplexed PCR or Direct    MALDI-TOF. Details: [email or web address]  Organism: Blood Culture PCR  Organism: Blood Culture PCR    Sensitivities:      Method Type: PCR      -  Methicillin SENSITIVE Staphylococcus aureus (MSSA): Detec Any isolate of Staphylococcus aureus from a blood culture is NOT considered a contaminant.    Culture - Blood (collected 10 Noble 2025 09:40)  Source: Blood Blood-Peripheral  Preliminary Report:    No growth at 24 hours    Culture - Urine (collected 09 Jun 2025 03:27)  Source: Clean Catch Clean Catch (Midstream)  Final Report:    >100,000 CFU/ml Staphylococcus aureus  Organism: Staphylococcus aureus  Organism: Staphylococcus aureus    Sensitivities:      -  Nitrofurantoin: S <=32      -  Oxacillin: S <=0.25 Oxacillin predicts susceptibility for dicloxacillin, methicillin, and nafcillin      -  Gentamicin: S <=4 Should not be used as monotherapy      -  Vancomycin: S 0.5      -  Tetracycline: S <=4      Method Type: WILL      -  Rifampin: S <=1 Should not be used as monotherapy      -  Trimethoprim/Sulfamethoxazole: S <=0.5/9.5                                                A1C with Estimated Average Glucose Result: 13.0 % (06-09-25 @ 03:29)      RADIOLOGY:  No imaging to review    Patient is a 55 year old male with PMH of DM2 and HTN who presents for urinary sx. Patient states for a little over a week he has been experiencing dysuria, hesitancy, urgency associated with suprapubic abdominal pain. No fevers, chills or flank pain but endorses sweats when he tries to go to the bathroom as it is very difficult to urinate. Never had sx like this before and denies ever being treated for a UTI. No recent dental work, prosthetic joints/devices.     In the ER:   Vitals notable for tachycardia.   Labs: CBC with leukocytosis to 15.10 and CMP notable for hyperglycemia with serum glucose 685. A1C 13. U/A with >4000 WBCs and urine culture with MSSA. 1/4 blood cultures with MSSA. Patient states his urinary sx were intiially improved when he had the darnell placed but since removal yesterday, he has had worsening sx again.     Abx:   Cefazolin (6/12-)   -s/p Ceftriaxone ( 6/9-6/12)     REVIEW OF SYSTEMS  Constitutional: No fevers, No chills  Respiratory: No cough, no SOB  Cardiovascular:  No chest pain, No palpitations   Gastrointestinal: + pain, + nausea, No vomiting, No diarrhea, No constipation	  Genitourinary: + dysuria, No frequency, + hesitancy, No flank pain  MSK: No Joint pain, No back pain, No edema  Neurological: No HA, no weakness, no seizures, no AMS     prior hospital charts reviewed [V]  primary team notes reviewed [V]  other consultant notes reviewed [V]    PAST MEDICAL & SURGICAL HISTORY:  Type 2 diabetes mellitus      Essential hypertension      Umbilical hernia          SOCIAL HISTORY:  Denied smoking/vaping/alcohol/recreational drug use    FAMILY HISTORY:  FH: CAD (coronary artery disease) (Father)    Family history of CVA (Father, Sibling, Uncle)        Allergies  No Known Allergies        ANTIMICROBIALS:  ceFAZolin   IVPB 2000 every 8 hours      ANTIMICROBIALS (past 90 days):  MEDICATIONS  (STANDING):    cefTRIAXone   IVPB   100 mL/Hr IV Intermittent (06-12-25 @ 03:13)   100 mL/Hr IV Intermittent (06-11-25 @ 02:57)   100 mL/Hr IV Intermittent (06-10-25 @ 03:57)    cefTRIAXone   IVPB   100 mL/Hr IV Intermittent (06-09-25 @ 04:43)        OTHER MEDS:   MEDICATIONS  (STANDING):  acetaminophen     Tablet .. 650 every 6 hours PRN  dextrose 50% Injectable 25 once  dextrose 50% Injectable 12.5 once  dextrose 50% Injectable 25 once  dextrose Oral Gel 15 once PRN  enalapril 10 daily  glucagon  Injectable 1 once  insulin glargine Injectable (LANTUS) 19 <User Schedule>  insulin lispro (ADMELOG) corrective regimen sliding scale  three times a day before meals  insulin lispro (ADMELOG) corrective regimen sliding scale  at bedtime  insulin lispro Injectable (ADMELOG) 6 three times a day before meals  phenazopyridine 100 every 8 hours      VITALS:  Vital Signs Last 24 Hrs  T(F): 98.7 (06-12-25 @ 06:33), Max: 99.1 (06-09-25 @ 21:30)    Vital Signs Last 24 Hrs  HR: 97 (06-12-25 @ 06:33) (87 - 97)  BP: 170/98 (06-12-25 @ 06:33) (142/94 - 174/101)  RR: 17 (06-12-25 @ 06:33)  SpO2: 100% (06-12-25 @ 06:33) (98% - 100%)  Wt(kg): --    EXAM:  General: Patient appears comfortable, no acute distress  HEENT: NCAT, PERRL, anicteric sclera, mucous membranes moist and intact  CV: +S1/S2, +tachycardic   Lungs: No respiratory distress, CTA b/l, no wheezing, rales or rhonchi  Abd:  BS4+, Soft, NTND, no guarding  : + suprapubic tenderness  Neuro: AAOx3. No focal deficits noted.   Ext: No cyanosis, no edema  Msk: freely moving upper and lower extremities  Skin: No rash, no phlebitis, No erythema       Labs:                        10.7   11.88 )-----------( 339      ( 12 Jun 2025 06:53 )             31.3     06-12    132[L]  |  96[L]  |  19  ----------------------------<  244[H]  3.6   |  24  |  0.84    Ca    9.1      12 Jun 2025 06:53  Phos  4.2     06-12  Mg     2.00     06-12    TPro  7.1  /  Alb  3.1[L]  /  TBili  0.9  /  DBili  x   /  AST  8   /  ALT  7   /  AlkPhos  66  06-12      WBC Trend:  WBC Count: 11.88 (06-12-25 @ 06:53)  WBC Count: 12.69 (06-11-25 @ 04:10)  WBC Count: 17.32 (06-10-25 @ 03:56)  WBC Count: 17.59 (06-09-25 @ 11:00)          Creatine Trend:  Creatinine: 0.84 (06-12)  Creatinine: 0.95 (06-11)  Creatinine: 0.83 (06-10)  Creatinine: 0.89 (06-09)      Liver Biochemical Testing Trend:  Alanine Aminotransferase (ALT/SGPT): 7 (06-12)  Alanine Aminotransferase (ALT/SGPT): 8 (06-09)  Alanine Aminotransferase (ALT/SGPT): 9 (06-09)  Aspartate Aminotransferase (AST/SGOT): 8 (06-12-25 @ 06:53)  Aspartate Aminotransferase (AST/SGOT): 10 (06-09-25 @ 11:00)  Aspartate Aminotransferase (AST/SGOT): 13 (06-09-25 @ 03:29)  Bilirubin Total: 0.9 (06-12)  Bilirubin Total: 0.4 (06-09)  Bilirubin Total: 0.6 (06-09)      Trend LDH          MICROBIOLOGY:    MRSA PCR Result.: NotDetec (06-11-25 @ 16:09)      Culture - Blood (collected 10 Noble 2025 09:47)  Source: Blood Blood-Peripheral  Preliminary Report:    Growth in anaerobic bottle: Gram Positive Cocci in Clusters    Direct identification is available within approximately 3-5    hours either by Blood Panel Multiplexed PCR or Direct    MALDI-TOF. Details: [email or web address]  Organism: Blood Culture PCR  Organism: Blood Culture PCR    Sensitivities:      Method Type: PCR      -  Methicillin SENSITIVE Staphylococcus aureus (MSSA): Detec Any isolate of Staphylococcus aureus from a blood culture is NOT considered a contaminant.    Culture - Blood (collected 10 Noble 2025 09:40)  Source: Blood Blood-Peripheral  Preliminary Report:    No growth at 24 hours    Culture - Urine (collected 09 Jun 2025 03:27)  Source: Clean Catch Clean Catch (Midstream)  Final Report:    >100,000 CFU/ml Staphylococcus aureus  Organism: Staphylococcus aureus  Organism: Staphylococcus aureus    Sensitivities:      -  Nitrofurantoin: S <=32      -  Oxacillin: S <=0.25 Oxacillin predicts susceptibility for dicloxacillin, methicillin, and nafcillin      -  Gentamicin: S <=4 Should not be used as monotherapy      -  Vancomycin: S 0.5      -  Tetracycline: S <=4      Method Type: WILL      -  Rifampin: S <=1 Should not be used as monotherapy      -  Trimethoprim/Sulfamethoxazole: S <=0.5/9.5                                                A1C with Estimated Average Glucose Result: 13.0 % (06-09-25 @ 03:29)      RADIOLOGY:  No imaging to review

## 2025-06-12 NOTE — PHARMACOTHERAPY INTERVENTION NOTE - COMMENTS
Recommended ID consult for patient with Staphylococcus aureus (MSSA) bacteremia.       Asia Chase PharmD   Clinical Pharmacy Specialist, Infectious Diseases  Tele-Antimicrobial Stewardship Program (Tele-ASP)  Tele-ASP Phone: [phone]  
Recommended to de-escalate from ceftriaxone to cefazolin 2g q8h for Staphylococcus aureus (MSSA) bacteremia. eGFR 94.       Cristopher LockwoodD   Clinical Pharmacy Specialist, Infectious Diseases  Tele-Antimicrobial Stewardship Program (Tele-ASP)  Tele-ASP Phone: [phone]

## 2025-06-13 LAB
-  CLINDAMYCIN: SIGNIFICANT CHANGE UP
-  ERYTHROMYCIN: SIGNIFICANT CHANGE UP
-  GENTAMICIN: SIGNIFICANT CHANGE UP
-  OXACILLIN: SIGNIFICANT CHANGE UP
-  RIFAMPIN: SIGNIFICANT CHANGE UP
-  TETRACYCLINE: SIGNIFICANT CHANGE UP
-  TRIMETHOPRIM/SULFAMETHOXAZOLE: SIGNIFICANT CHANGE UP
-  VANCOMYCIN: SIGNIFICANT CHANGE UP
ALBUMIN SERPL ELPH-MCNC: 3.1 G/DL — LOW (ref 3.3–5)
ALP SERPL-CCNC: 61 U/L — SIGNIFICANT CHANGE UP (ref 40–120)
ALT FLD-CCNC: 5 U/L — SIGNIFICANT CHANGE UP (ref 4–41)
ANION GAP SERPL CALC-SCNC: 13 MMOL/L — SIGNIFICANT CHANGE UP (ref 7–14)
APPEARANCE UR: CLEAR — SIGNIFICANT CHANGE UP
AST SERPL-CCNC: 9 U/L — SIGNIFICANT CHANGE UP (ref 4–40)
BACTERIA # UR AUTO: NEGATIVE /HPF — SIGNIFICANT CHANGE UP
BASOPHILS # BLD AUTO: 0.08 K/UL — SIGNIFICANT CHANGE UP (ref 0–0.2)
BASOPHILS NFR BLD AUTO: 0.6 % — SIGNIFICANT CHANGE UP (ref 0–2)
BILIRUB SERPL-MCNC: 0.8 MG/DL — SIGNIFICANT CHANGE UP (ref 0.2–1.2)
BILIRUB UR-MCNC: NEGATIVE — SIGNIFICANT CHANGE UP
BUN SERPL-MCNC: 19 MG/DL — SIGNIFICANT CHANGE UP (ref 7–23)
CALCIUM SERPL-MCNC: 9.2 MG/DL — SIGNIFICANT CHANGE UP (ref 8.4–10.5)
CAST: 0 /LPF — SIGNIFICANT CHANGE UP (ref 0–4)
CHLORIDE SERPL-SCNC: 99 MMOL/L — SIGNIFICANT CHANGE UP (ref 98–107)
CO2 SERPL-SCNC: 26 MMOL/L — SIGNIFICANT CHANGE UP (ref 22–31)
COLOR SPEC: SIGNIFICANT CHANGE UP
CREAT SERPL-MCNC: 0.92 MG/DL — SIGNIFICANT CHANGE UP (ref 0.5–1.3)
CULTURE RESULTS: ABNORMAL
DIFF PNL FLD: NEGATIVE — SIGNIFICANT CHANGE UP
EGFR: 98 ML/MIN/1.73M2 — SIGNIFICANT CHANGE UP
EGFR: 98 ML/MIN/1.73M2 — SIGNIFICANT CHANGE UP
EOSINOPHIL # BLD AUTO: 0.1 K/UL — SIGNIFICANT CHANGE UP (ref 0–0.5)
EOSINOPHIL NFR BLD AUTO: 0.8 % — SIGNIFICANT CHANGE UP (ref 0–6)
GLUCOSE BLDC GLUCOMTR-MCNC: 119 MG/DL — HIGH (ref 70–99)
GLUCOSE BLDC GLUCOMTR-MCNC: 159 MG/DL — HIGH (ref 70–99)
GLUCOSE BLDC GLUCOMTR-MCNC: 204 MG/DL — HIGH (ref 70–99)
GLUCOSE BLDC GLUCOMTR-MCNC: 81 MG/DL — SIGNIFICANT CHANGE UP (ref 70–99)
GLUCOSE SERPL-MCNC: 133 MG/DL — HIGH (ref 70–99)
GLUCOSE UR QL: 250 MG/DL
HCT VFR BLD CALC: 31.5 % — LOW (ref 39–50)
HGB BLD-MCNC: 10.3 G/DL — LOW (ref 13–17)
IANC: 7.33 K/UL — SIGNIFICANT CHANGE UP (ref 1.8–7.4)
IMM GRANULOCYTES NFR BLD AUTO: 0.7 % — SIGNIFICANT CHANGE UP (ref 0–0.9)
KETONES UR QL: NEGATIVE MG/DL — SIGNIFICANT CHANGE UP
LEUKOCYTE ESTERASE UR-ACNC: ABNORMAL
LYMPHOCYTES # BLD AUTO: 3.97 K/UL — HIGH (ref 1–3.3)
LYMPHOCYTES # BLD AUTO: 31.9 % — SIGNIFICANT CHANGE UP (ref 13–44)
MCHC RBC-ENTMCNC: 28.5 PG — SIGNIFICANT CHANGE UP (ref 27–34)
MCHC RBC-ENTMCNC: 32.7 G/DL — SIGNIFICANT CHANGE UP (ref 32–36)
MCV RBC AUTO: 87 FL — SIGNIFICANT CHANGE UP (ref 80–100)
METHOD TYPE: SIGNIFICANT CHANGE UP
MONOCYTES # BLD AUTO: 0.87 K/UL — SIGNIFICANT CHANGE UP (ref 0–0.9)
MONOCYTES NFR BLD AUTO: 7 % — SIGNIFICANT CHANGE UP (ref 2–14)
NEUTROPHILS # BLD AUTO: 7.33 K/UL — SIGNIFICANT CHANGE UP (ref 1.8–7.4)
NEUTROPHILS NFR BLD AUTO: 59 % — SIGNIFICANT CHANGE UP (ref 43–77)
NITRITE UR-MCNC: POSITIVE
NRBC # BLD AUTO: 0 K/UL — SIGNIFICANT CHANGE UP (ref 0–0)
NRBC # FLD: 0 K/UL — SIGNIFICANT CHANGE UP (ref 0–0)
NRBC BLD AUTO-RTO: 0 /100 WBCS — SIGNIFICANT CHANGE UP (ref 0–0)
ORGANISM # SPEC MICROSCOPIC CNT: ABNORMAL
PH UR: 5.5 — SIGNIFICANT CHANGE UP (ref 5–8)
PLATELET # BLD AUTO: 357 K/UL — SIGNIFICANT CHANGE UP (ref 150–400)
POTASSIUM SERPL-MCNC: 3.6 MMOL/L — SIGNIFICANT CHANGE UP (ref 3.5–5.3)
POTASSIUM SERPL-SCNC: 3.6 MMOL/L — SIGNIFICANT CHANGE UP (ref 3.5–5.3)
PROT SERPL-MCNC: 7 G/DL — SIGNIFICANT CHANGE UP (ref 6–8.3)
PROT UR-MCNC: SIGNIFICANT CHANGE UP MG/DL
RBC # BLD: 3.62 M/UL — LOW (ref 4.2–5.8)
RBC # FLD: 12 % — SIGNIFICANT CHANGE UP (ref 10.3–14.5)
RBC CASTS # UR COMP ASSIST: 1 /HPF — SIGNIFICANT CHANGE UP (ref 0–4)
SODIUM SERPL-SCNC: 138 MMOL/L — SIGNIFICANT CHANGE UP (ref 135–145)
SP GR SPEC: 1.04 — HIGH (ref 1–1.03)
SPECIMEN SOURCE: SIGNIFICANT CHANGE UP
SQUAMOUS # UR AUTO: 0 /HPF — SIGNIFICANT CHANGE UP (ref 0–5)
UROBILINOGEN FLD QL: 1 MG/DL — SIGNIFICANT CHANGE UP (ref 0.2–1)
WBC # BLD: 12.44 K/UL — HIGH (ref 3.8–10.5)
WBC # FLD AUTO: 12.44 K/UL — HIGH (ref 3.8–10.5)
WBC UR QL: 4 /HPF — SIGNIFICANT CHANGE UP (ref 0–5)

## 2025-06-13 PROCEDURE — 99233 SBSQ HOSP IP/OBS HIGH 50: CPT

## 2025-06-13 PROCEDURE — 76870 US EXAM SCROTUM: CPT | Mod: 26

## 2025-06-13 PROCEDURE — 99252 IP/OBS CONSLTJ NEW/EST SF 35: CPT

## 2025-06-13 PROCEDURE — 99232 SBSQ HOSP IP/OBS MODERATE 35: CPT

## 2025-06-13 RX ORDER — TAMSULOSIN HYDROCHLORIDE 0.4 MG/1
0.4 CAPSULE ORAL AT BEDTIME
Refills: 0 | Status: DISCONTINUED | OUTPATIENT
Start: 2025-06-13 | End: 2025-06-19

## 2025-06-13 RX ORDER — INSULIN LISPRO 100 U/ML
2 INJECTION, SOLUTION INTRAVENOUS; SUBCUTANEOUS ONCE
Refills: 0 | Status: COMPLETED | OUTPATIENT
Start: 2025-06-13 | End: 2025-06-13

## 2025-06-13 RX ORDER — INSULIN LISPRO 100 U/ML
8 INJECTION, SOLUTION INTRAVENOUS; SUBCUTANEOUS
Refills: 0 | Status: DISCONTINUED | OUTPATIENT
Start: 2025-06-13 | End: 2025-06-14

## 2025-06-13 RX ORDER — INSULIN GLARGINE-YFGN 100 [IU]/ML
19 INJECTION, SOLUTION SUBCUTANEOUS
Refills: 0 | Status: DISCONTINUED | OUTPATIENT
Start: 2025-06-14 | End: 2025-06-15

## 2025-06-13 RX ORDER — INSULIN GLARGINE-YFGN 100 [IU]/ML
19 INJECTION, SOLUTION SUBCUTANEOUS
Refills: 0 | Status: DISCONTINUED | OUTPATIENT
Start: 2025-06-13 | End: 2025-06-13

## 2025-06-13 RX ORDER — FINASTERIDE 1 MG/1
5 TABLET, FILM COATED ORAL DAILY
Refills: 0 | Status: DISCONTINUED | OUTPATIENT
Start: 2025-06-13 | End: 2025-06-19

## 2025-06-13 RX ADMIN — INSULIN LISPRO 7 UNIT(S): 100 INJECTION, SOLUTION INTRAVENOUS; SUBCUTANEOUS at 13:15

## 2025-06-13 RX ADMIN — Medication 100 MILLIGRAM(S): at 13:16

## 2025-06-13 RX ADMIN — Medication 100 MILLIGRAM(S): at 05:42

## 2025-06-13 RX ADMIN — Medication 100 MILLIGRAM(S): at 22:28

## 2025-06-13 RX ADMIN — INSULIN GLARGINE-YFGN 19 UNIT(S): 100 INJECTION, SOLUTION SUBCUTANEOUS at 13:15

## 2025-06-13 RX ADMIN — INSULIN LISPRO 7 UNIT(S): 100 INJECTION, SOLUTION INTRAVENOUS; SUBCUTANEOUS at 09:10

## 2025-06-13 RX ADMIN — Medication 100 MILLIGRAM(S): at 05:41

## 2025-06-13 RX ADMIN — Medication 10 MILLIGRAM(S): at 05:41

## 2025-06-13 RX ADMIN — TAMSULOSIN HYDROCHLORIDE 0.4 MILLIGRAM(S): 0.4 CAPSULE ORAL at 22:27

## 2025-06-13 RX ADMIN — INSULIN LISPRO 2: 100 INJECTION, SOLUTION INTRAVENOUS; SUBCUTANEOUS at 13:15

## 2025-06-13 RX ADMIN — Medication 1 APPLICATION(S): at 13:16

## 2025-06-13 RX ADMIN — INSULIN LISPRO 2 UNIT(S): 100 INJECTION, SOLUTION INTRAVENOUS; SUBCUTANEOUS at 18:18

## 2025-06-13 NOTE — CONSULT NOTE ADULT - ATTENDING COMMENTS
This is a 56 y/o M w/ PMHx of DM2, HTN,  admitted to [facility] on 6/9 for difficulty urinating, w/ some dysuria, hematuria.   Pt was afebrile, noted to have WBC ot 17.6, elevated glucose, positive U/A. UCx and 1/4 BCx with MSSA.     #MSSA bacteremia w/o clear source   #Urinary retention   #Hematuria   #Leukocytosis, improved     Unclear source of MSSA bacteremia, CXR negative, no cellulitis, no symptoms of PNA, no lines, no HW, no cardiac devices. Pt symptoms are primarily urinary, UTI is uncommon source of MSSA bacteremia without introduction of infection w/ darnell. Would obtain CT A/P to r/o prostatitis, prostatic abscess.     Recommendations:   Cefazolin 2 g q8  Repeat BCx x 2   TTE   Would obtain CT A/P w/ IV contrast to r/o source of MSSA bacteremia, including potential prostatic abscess     Thank you for consulting us and involving us in the management of this patient's case. In addition to reviewing history, imaging, documents, labs, microbiology, and infection control strategies and potential issues.     ID will continue to follow    Alberto Gonzalez M.D.  Attending Physician  Division of Infectious Diseases  Department of Medicine    Please contact through MS Teams message.  Office: [phone] (after 5 PM or weekend)
Agree with above  Imaging reviewed. Large apical abscesses  Recommend aspiration (IR consult). TURP with risk of bleeding, incontinence and high risk of ejaculatory dysfunction (patient young)  Continue darnell  Continue abx

## 2025-06-13 NOTE — CHART NOTE - NSCHARTNOTEFT_GEN_A_CORE
Endocrine follow up  type 2 DM  chart reviewed  see last progress note for full plan of care    Interval hx: FS at goal this morning and above goal this afternoon.     CAPILLARY BLOOD GLUCOSE      POCT Blood Glucose.: 204 mg/dL (13 Jun 2025 12:18)  POCT Blood Glucose.: 119 mg/dL (13 Jun 2025 08:52)  POCT Blood Glucose.: 177 mg/dL (12 Jun 2025 22:30)  POCT Blood Glucose.: 150 mg/dL (12 Jun 2025 17:03)    MEDICATIONS  (STANDING):  ceFAZolin   IVPB 2000 milliGRAM(s) IV Intermittent every 8 hours  chlorhexidine 2% Cloths 1 Application(s) Topical daily  dextrose 5%. 1000 milliLiter(s) (100 mL/Hr) IV Continuous <Continuous>  dextrose 5%. 1000 milliLiter(s) (50 mL/Hr) IV Continuous <Continuous>  dextrose 50% Injectable 25 Gram(s) IV Push once  dextrose 50% Injectable 12.5 Gram(s) IV Push once  dextrose 50% Injectable 25 Gram(s) IV Push once  enalapril 10 milliGRAM(s) Oral daily  glucagon  Injectable 1 milliGRAM(s) IntraMuscular once  insulin glargine Injectable (LANTUS) 19 Unit(s) SubCutaneous <User Schedule>  insulin lispro (ADMELOG) corrective regimen sliding scale   SubCutaneous three times a day before meals  insulin lispro (ADMELOG) corrective regimen sliding scale   SubCutaneous at bedtime  insulin lispro Injectable (ADMELOG) 8 Unit(s) SubCutaneous three times a day before meals    Diet, Regular:   Consistent Carbohydrate {Evening Snack} (CSTCHOSN)  DASH/TLC {Sodium & Cholesterol Restricted} (DASH) (06-09-25 @ 07:12) [Active]      55M with history of DM2 and HTN (not on medications currently for either) who presents to the hospital with complaints of difficulty urinating since last night. Said that for the past 1 week he has been having urinary frequency, urgency, and dysuria. Had some hematuria initially with these symptoms but that resolved soon after it had started. Was also having chills and diaphoresis but no fevers and therefore did not seek medical attention. Said that since last night he would feel the urge to urinate but would only dribble a little and have a sensation of incomplete voiding. This continued for a while and he started to have pelvic pain and discomfort and therefore came to the hospital. Here had a darnell placed with almost immediate relief in his symptoms. Currently states he has some mild abdominal pain and had 1 episode of NBNB emesis in the ED. Also reports having chronic peripheral neuropathy x years of his feet. Otherwise denies any acute complaints. Endocrine consulted for uncontrolled diabetes, A1c 13.    Endocrine history:   DM dx: diagnosed with type 2 diabetes 5 to 6 years ago, thinks his A1c back then was 10  Medications: metformin 500 mg twice daily – stopped 2 to 3 years ago. Patient states that metformin was not working and was not doing anything for his diabetes since he was still hyperglycemic at home.  Fingersticks/CGM: Does not use CGM.  When he was checking his fingerstick, it was usually 200-300s.  He stopped checking his fingerstick a long time ago.   Diet: Patient stated that he tries to control his diabetes with diet.  He eats a lot of vegetables and makes his own juice–papaya.  He eats rice, bread.  Endocrinologist: None  Ophthalmologist evaluation: Last eye exam was 3 to 4 years ago.  Patient currently unemployed.  He has no medical insurance.  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  Patient not in DKA.     Inpatient plan   - FS goal 100-180 mg/dl   - FS at goal this morning and above goal this afternoon.   - Continue Lantus 19 units daily, moved to 5pm tomorrow. Will move Lantus 2 hours each day until bedtime reached  - Increase Admelog to 8 units TID AC.  Hold Admelog if not eating/NPO.  - continue low Admelog correctional scale TID AC  - continue separate low Admelog correctional scale at HS  - FS TID AC & HS ---> q6 if NPO   - hypoglycemia protocol PRN   - consistent carbohydrate diet  - RD consult  - provider to RN - insulin pen teaching    Discharge plan  - Patient uninsured.  - Discussed with patient regarding basal/bolus insulin at home.  Patient tentatively agreed to using insulin, stating "I will try my best".  - Discharge on Basaglar 21 units daily - can move 2 hours each day to get to bedtime dosing + Humalog 6 units premeal TID + metformin 500 mg PO BID for 7 days then increase to 1000 mg PO BID.   - Please send prescription for Basaglar pen + Humalog pen  Coupon code:  LV: [id number]  Group: [id number]  Vein: [id number]  PCN: SSN  - please send prescription for glucometer and supplies - test strip, lancets, alcohol pads and insulin pen needles.   - Please send prescription for glucose tablets 4G (take 4 tablets) or 15G tablets for blood sugar less than 70 mG/dL, repeat fingerstick in 15 minutes. Call your provider for dose adjustment if needed.   - Follow up at [facility]ocrine [facility]: [address] [place] [address]; Ph # [phone]     CHERYL Lao-BC  Nurse Practitioner  Division of Endocrinology & Diabetes  Available on Microsoft Teams Endocrine follow up  type 2 DM  chart reviewed  see last progress note for full plan of care    Interval hx: FS at goal this morning and above goal this afternoon.     CAPILLARY BLOOD GLUCOSE      POCT Blood Glucose.: 204 mg/dL (13 Jun 2025 12:18)  POCT Blood Glucose.: 119 mg/dL (13 Jun 2025 08:52)  POCT Blood Glucose.: 177 mg/dL (12 Jun 2025 22:30)  POCT Blood Glucose.: 150 mg/dL (12 Jun 2025 17:03)    MEDICATIONS  (STANDING):  ceFAZolin   IVPB 2000 milliGRAM(s) IV Intermittent every 8 hours  chlorhexidine 2% Cloths 1 Application(s) Topical daily  dextrose 5%. 1000 milliLiter(s) (100 mL/Hr) IV Continuous <Continuous>  dextrose 5%. 1000 milliLiter(s) (50 mL/Hr) IV Continuous <Continuous>  dextrose 50% Injectable 25 Gram(s) IV Push once  dextrose 50% Injectable 12.5 Gram(s) IV Push once  dextrose 50% Injectable 25 Gram(s) IV Push once  enalapril 10 milliGRAM(s) Oral daily  glucagon  Injectable 1 milliGRAM(s) IntraMuscular once  insulin glargine Injectable (LANTUS) 19 Unit(s) SubCutaneous <User Schedule>  insulin lispro (ADMELOG) corrective regimen sliding scale   SubCutaneous three times a day before meals  insulin lispro (ADMELOG) corrective regimen sliding scale   SubCutaneous at bedtime  insulin lispro Injectable (ADMELOG) 8 Unit(s) SubCutaneous three times a day before meals    Diet, Regular:   Consistent Carbohydrate {Evening Snack} (CSTCHOSN)  DASH/TLC {Sodium & Cholesterol Restricted} (DASH) (06-09-25 @ 07:12) [Active]      55M with history of DM2 and HTN (not on medications currently for either) who presents to the hospital with complaints of difficulty urinating since last night. Said that for the past 1 week he has been having urinary frequency, urgency, and dysuria. Had some hematuria initially with these symptoms but that resolved soon after it had started. Was also having chills and diaphoresis but no fevers and therefore did not seek medical attention. Said that since last night he would feel the urge to urinate but would only dribble a little and have a sensation of incomplete voiding. This continued for a while and he started to have pelvic pain and discomfort and therefore came to the hospital. Here had a darnell placed with almost immediate relief in his symptoms. Currently states he has some mild abdominal pain and had 1 episode of NBNB emesis in the ED. Also reports having chronic peripheral neuropathy x years of his feet. Otherwise denies any acute complaints. Endocrine consulted for uncontrolled diabetes, A1c 13.    Endocrine history:   DM dx: diagnosed with type 2 diabetes 5 to 6 years ago, thinks his A1c back then was 10  Medications: metformin 500 mg twice daily – stopped 2 to 3 years ago. Patient states that metformin was not working and was not doing anything for his diabetes since he was still hyperglycemic at home.  Fingersticks/CGM: Does not use CGM.  When he was checking his fingerstick, it was usually 200-300s.  He stopped checking his fingerstick a long time ago.   Diet: Patient stated that he tries to control his diabetes with diet.  He eats a lot of vegetables and makes his own juice–papaya.  He eats rice, bread.  Endocrinologist: None  Ophthalmologist evaluation: Last eye exam was 3 to 4 years ago.  Patient currently unemployed.  He has no medical insurance.  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  Patient not in DKA.     Inpatient plan   - FS goal 100-180 mg/dl   - FS at goal this morning and above goal this afternoon.   - Can continue Lantus 19 units daily, moved to 5pm tomorrow. Will move Lantus 2 hours each day until bedtime reached  - Increase Admelog to 8 units TID AC.  Hold Admelog if not eating/NPO.  - continue low Admelog correctional scale TID AC  - continue separate low Admelog correctional scale at HS  - FS TID AC & HS ---> q6 if NPO   - hypoglycemia protocol PRN   - consistent carbohydrate diet  - RD consult  - provider to RN - insulin pen teaching    Discharge plan  - Patient uninsured.  - Discussed with patient regarding basal/bolus insulin at home.  Patient tentatively agreed to using insulin, stating "I will try my best".  - Discharge on Basaglar 21 units daily - can move 2 hours each day to get to bedtime dosing + Humalog 6 units premeal TID + metformin 500 mg PO BID for 7 days then increase to 1000 mg PO BID.   - Please send prescription for Basaglar pen + Humalog pen  Coupon code:  LV: [id number]  Group: [id number]  Vein: [id number]  PCN: SSN  - please send prescription for glucometer and supplies - test strip, lancets, alcohol pads and insulin pen needles.   - Please send prescription for glucose tablets 4G (take 4 tablets) or 15G tablets for blood sugar less than 70 mG/dL, repeat fingerstick in 15 minutes. Call your provider for dose adjustment if needed.   - Follow up at [facility]: [address] [place] [address]; Ph # [phone]     CHERYL Lao-BC  Nurse Practitioner  Division of Endocrinology & Diabetes  Available on Microsoft Teams

## 2025-06-13 NOTE — PROGRESS NOTE ADULT - SUBJECTIVE AND OBJECTIVE BOX
[facility] Division of Hospital Medicine  Martha Ortiz DO  Available via MS Teams  Pager: [phone]    Patient is a 55y old  Male who presents with a chief complaint of Urinary retention, Sepsis due to UTI, Uncontrolled DM2 (12 Jun 2025 14:19)      SUBJECTIVE / OVERNIGHT EVENTS:    Pt seen and examined this morning. Pt feels well today and denies any acute complaints. He understands plan for u/s and urology evaluation.    ADDITIONAL REVIEW OF SYSTEMS:  No Fever, chills, chest pain, shortness of breath.     MEDICATIONS  (STANDING):  ceFAZolin   IVPB 2000 milliGRAM(s) IV Intermittent every 8 hours  chlorhexidine 2% Cloths 1 Application(s) Topical daily  dextrose 5%. 1000 milliLiter(s) (100 mL/Hr) IV Continuous <Continuous>  dextrose 5%. 1000 milliLiter(s) (50 mL/Hr) IV Continuous <Continuous>  dextrose 50% Injectable 25 Gram(s) IV Push once  dextrose 50% Injectable 12.5 Gram(s) IV Push once  dextrose 50% Injectable 25 Gram(s) IV Push once  enalapril 10 milliGRAM(s) Oral daily  glucagon  Injectable 1 milliGRAM(s) IntraMuscular once  insulin glargine Injectable (LANTUS) 19 Unit(s) SubCutaneous <User Schedule>  insulin lispro (ADMELOG) corrective regimen sliding scale   SubCutaneous three times a day before meals  insulin lispro (ADMELOG) corrective regimen sliding scale   SubCutaneous at bedtime  insulin lispro Injectable (ADMELOG) 7 Unit(s) SubCutaneous three times a day before meals    MEDICATIONS  (PRN):  acetaminophen     Tablet .. 650 milliGRAM(s) Oral every 6 hours PRN Temp greater or equal to 38C (100.4F), Mild Pain (1 - 3)  dextrose Oral Gel 15 Gram(s) Oral once PRN Blood Glucose LESS THAN 70 milliGRAM(s)/deciliter      I&O's Summary    12 Jun 2025 07:01  -  13 Jun 2025 07:00  --------------------------------------------------------  IN: 0 mL / OUT: 2600 mL / NET: -2600 mL        PHYSICAL EXAM:  Vital Signs Last 24 Hrs  T(C): 36.6 (13 Jun 2025 12:00), Max: 36.8 (12 Jun 2025 15:07)  T(F): 97.8 (13 Jun 2025 12:00), Max: 98.2 (12 Jun 2025 15:07)  HR: 81 (13 Jun 2025 12:00) (81 - 101)  BP: 122/78 (13 Jun 2025 12:00) (122/78 - 172/95)  BP(mean): 109 (13 Jun 2025 04:11) (109 - 109)  RR: 18 (13 Jun 2025 12:00) (18 - 23)  SpO2: 100% (13 Jun 2025 12:00) (98% - 100%)    Parameters below as of 13 Jun 2025 12:00  Patient On (Oxygen Delivery Method): room air      CONSTITUTIONAL: NAD  EYES: PERRLA  ENMT: Moist oral mucosa  RESPIRATORY: Normal respiratory effort; lungs are clear to auscultation bilaterally  CARDIOVASCULAR: Regular rate and rhythm, normal S1 and S2, no murmur/rub/gallop  ABDOMEN: Nontender to palpation, normoactive bowel sounds  PSYCH: A+O to person, place, and time  NEUROLOGY: CN 2-12 are intact and symmetric; no gross sensory deficits     LABS:                        10.3   12.44 )-----------( 357      ( 13 Jun 2025 04:18 )             31.5     06-13    138  |  99  |  19  ----------------------------<  133[H]  3.6   |  26  |  0.92    Ca    9.2      13 Jun 2025 04:18  Phos  4.2     06-12  Mg     2.00     06-12    TPro  7.0  /  Alb  3.1[L]  /  TBili  0.8  /  DBili  x   /  AST  9   /  ALT  5   /  AlkPhos  61  06-13          Urinalysis Basic - ( 13 Jun 2025 04:18 )    Color: x / Appearance: x / SG: x / pH: x  Gluc: 133 mg/dL / Ketone: x  / Bili: x / Urobili: x   Blood: x / Protein: x / Nitrite: x   Leuk Esterase: x / RBC: x / WBC x   Sq Epi: x / Non Sq Epi: x / Bacteria: x        Urinalysis with Rflx Culture (collected 13 Jun 2025 01:01)

## 2025-06-13 NOTE — PROGRESS NOTE ADULT - ASSESSMENT
56 y/o M w/ PMHx of DM2, HTN,  admitted to [facility] on 6/9 for difficulty urinating, w/ some dysuria, hematuria  afebrile, noted to have WBC ot 17.6  positive U/A. UCx and 1/4 BCx with MSSA  Per patient has had dysuria since admission, then had darnell placed during admission  CT urinary bladder thickening/cystitis; lesion of prostate, abscesses  With no other source of MSSA, could this be a rare MSSA UTI/prostate abscess? (still very unusual)  TTE reassuring  Overall, MSSA Bacteremia, urinary retention  - Cefazolin 2g q 8  - Trend WBC to normal  - Would obtain urology evaluation--prostate abscesses, role for drainage?  - Repeat BCXs q 48 until clear  - Hold on NASIR, suspecting  source  - Close monitoring, e.g. could Prostatic abscess be from a GN organism?    Jonathan Farfan MD  Contact on TEAMS messaging from 9am - 5pm  From 5pm-9am, on weekends, or if no response call [phone]    Antibiotic decision making based on local antibiogram and available recent culture results

## 2025-06-13 NOTE — CONSULT NOTE ADULT - SUBJECTIVE AND OBJECTIVE BOX
Subjective  This is a 55M with history of DM2 and HTN (not on medications currently for either) who presents to the hospital with complaints of difficulty urinating since last night. Said that for the past 1 week he has been having urinary frequency, urgency, and dysuria. Had some hematuria initially with these symptoms but that resolved soon after it had started. Was also having chills and diaphoresis but no fevers and therefore did not seek medical attention. Said that since last night he would feel the urge to urinate but would only dribble a little and have a sensation of incomplete voiding. This continued for a while and he started to have pelvic pain and discomfort and therefore came to the hospital. Here had a darnell placed with almost immediate relief in his symptoms. Currently states he has some mild abdominal pain and had 1 episode of NBNB emesis in the ED. Also reports having chronic peripheral neuropathy x years of his feet. Otherwise denies any acute complaints.     On arrival to the ED his vitals were T 98.4, P 126, /100, RR22, O2 sat 100% RA. His lab work showed leukocytosis, hyperglycemia to 685 with A1C of 13, and a nl pH. His UA was grossly positive. He was given NS 1L, ceftriaxone 1g, admelog 5U and lantus 12U. He was admitted to medicine.     Patient is a 54yo M presenting with difficulty urinating, dysuria, frequency, urgency and perineal pain for 9 days. Patient states that before he never had any issues with urination and denied any incomplete emptying. Patient denies any fevers, chills, nausea, vomiting. Patient also endorsed some hematuria prior to admission. This admission patient has been afebrile although intermittently tachycardic. On admission patients WBC was 17 but since being on antibiotics has been 12.4 today. Patients ucx and Bcx positive for staph auerues. CT scan done which shows two 3cm prostatic abscesses near the apex. Scrotal US done which shows some hyperemia in the L epididymidis     Objective    Vital signs  T(F): , Max: 98.2 (06-12-25 @ 22:05)  HR: 81 (06-13-25 @ 12:00)  BP: 122/78 (06-13-25 @ 12:00)  SpO2: 100% (06-13-25 @ 12:00)  Wt(kg): --    Output     OUT:    Indwelling Catheter - Urethral (mL): 2100 mL    Voided (mL): 500 mL  Total OUT: 2600 mL    Total NET: -2600 mL          Gen: NAD  Abd: soft, nontender, nondistended  : darnell secured in place, draining light pink urine. Nontender testicles bilaterally, no pain in the distribution of the epididymis    Labs      06-13 @ 04:18    WBC 12.44 / Hct 31.5  / SCr 0.92     06-12 @ 06:53    WBC 11.88 / Hct 31.3  / SCr 0.84         Urinalysis with Rflx Culture (collected 06-13-25 @ 01:01)    Culture - Blood (collected 06-10-25 @ 09:47)  Source: Blood Blood-Peripheral  Gram Stain (06-12-25 @ 04:49):    Growth in anaerobic bottle: Gram Positive Cocci in Clusters  Final Report (06-13-25 @ 16:27):    Growth in anaerobic bottle: Staphylococcus aureus    Direct identification is available within approximately 3-5    hours either by Blood Panel Multiplexed PCR or Direct    MALDI-TOF. Details: [email or web address]  Organism: Blood Culture PCR  Staphylococcus aureus (06-13-25 @ 16:27)  Organism: Blood Culture PCR (06-13-25 @ 16:27)      Method Type: PCR      -  Methicillin SENSITIVE Staphylococcus aureus (MSSA): Detec Any isolate of Staphylococcus aureus from a blood culture is NOT considered a contaminant.  Organism: Staphylococcus aureus (06-13-25 @ 16:27)      -  Clindamycin: S <=0.25      -  Oxacillin: S <=0.25 Oxacillin predicts susceptibility for dicloxacillin, methicillin, and nafcillin      -  Gentamicin: S <=4 Should not be used as monotherapy      -  Vancomycin: S 1      -  Tetracycline: S <=4      Method Type: WILL      -  Rifampin: S <=1 Should not be used as monotherapy      -  Erythromycin: S <=0.25      -  Trimethoprim/Sulfamethoxazole: S <=0.5/9.5    Culture - Blood (collected 06-10-25 @ 09:40)  Source: Blood Blood-Peripheral  Preliminary Report (06-13-25 @ 13:01):    No growth at 72 Hours    Culture - Urine (collected 06-09-25 @ 03:27)  Source: Clean Catch Clean Catch (Midstream)  Final Report (06-12-25 @ 09:52):    >100,000 CFU/ml Staphylococcus aureus  Organism: Staphylococcus aureus (06-12-25 @ 09:52)  Organism: Staphylococcus aureus (06-12-25 @ 09:52)      -  Nitrofurantoin: S <=32      -  Oxacillin: S <=0.25 Oxacillin predicts susceptibility for dicloxacillin, methicillin, and nafcillin      -  Gentamicin: S <=4 Should not be used as monotherapy      -  Vancomycin: S 0.5      -  Tetracycline: S <=4      Method Type: WILL      -  Rifampin: S <=1 Should not be used as monotherapy      -  Trimethoprim/Sulfamethoxazole: S <=0.5/9.5        Urine Cx: staph  Blood Cx: stap    Imaging  ACC: [id number] EXAM:  CT ABDOMEN AND PELVIS IC   ORDERED BY: YOKASTA SINGH     PROCEDURE DATE:  06/12/2025          INTERPRETATION:  CLINICAL INFORMATION: MSSA bacteremia/UTI. Evaluate for   infection.    COMPARISON: None.    CONTRAST/COMPLICATIONS:  IV Contrast: Omnipaque 350  90 cc administered   10 cc discarded  Oral Contrast: NONE.    PROCEDURE:  CT of the Abdomen and Pelvis was performed.  Sagittal and coronal reformats were performed.    FINDINGS:  LOWER CHEST: Within normal limits.    LIVER: Within normal limits.  BILE DUCTS: Normal caliber.  GALLBLADDER: Within normal limits.  SPLEEN: Within normal limits.  PANCREAS: Within normal limits.  ADRENALS: Within normal limits.  KIDNEYS/URETERS: Normal and symmetric renal enhancement. No   hydronephrosis.    BLADDER: Diffuse wall thickening and perivesical fat infiltration.  REPRODUCTIVE ORGANS: Enlarged prostate. There are two peripherally   enhancing low density lesions within the prostate that are suspicious for   abscesses:  *  Lesion measuring 3.0 x 2.2 cm in the right aspect of the prostate   (2:142).  *  Lesion measuring 2.6 x 1.7 cm in the left aspect of the prostate   (2:143).    Low-density rim-enhancing lesion at the junction of the right seminal   vesicle and prostate measuring 1.4 x 1.0 cm (2:133), suspicious for an   additional abscess.    Cystic lesion in the right scrotum measuring 2.6 x 2.4 cm (2:164).    BOWEL: No bowel obstruction. Colonic diverticula without evidence of   acute diverticulitis. Appendix is normal.  PERITONEUM/RETROPERITONEUM: Within normal limits.  VESSELS: Mild atherosclerotic changes.  LYMPH NODES: No lymphadenopathy.  ABDOMINAL WALL: Small wid[place] umbilical hernia containing nonobstructed   small bowel.  BONES: Degenerative changes.    IMPRESSION:  *  Urinary bladder wall thickening and perivesical fat infiltration,   concerning for cystitis.  *  Several low-density peripherally enhancing lesions in the prostate and   at the junction of the prostate and right seminal vesicle, suspicious for   abscesses, the largest measuring 3.0 cm.  *  Cystic lesion in the right hemiscrotum measuring 2.6 cm. A scrotal   ultrasound can be performed for further evaluation.            --- End of Report ---          CHELLE GRANDE MD; Resident Radiologist  This document has been electronically signed.  TENNILLE SCHMITZ MD; Attending Radiologist  This document has been electronically signed. Jun 12 2025 11:58PM

## 2025-06-13 NOTE — CONSULT NOTE ADULT - ASSESSMENT
56yo M presenting with primary urinary complaints found to have prostatic abscess on imaging. CT scan shows two large 3cm abscesses in the apex of the prostate and a smaller abscess near the base. Given that patient is bacteremic and size of abscesses on imaging recommend drainage with IR. Would avoid TUR unroofing of abscess given apical location near the sphincter and higher risk for potential incontinence.    Recommendations  - Continue abx  - Repeat blood cultures  - IR consult for aspiration of prostatic abscesses, do not need to drain smaller abscess near the base  - Start flomax and finasteride  - Continue darnell catheter for now  - Discussed with Dr. Olmos 56yo M presenting with primary urinary complaints found to have prostatic abscess on imaging. CT scan shows two large 3cm abscesses in the apex of the prostate and a smaller abscess near the base.     Given that patient is bacteremic and size of abscesses on imaging recommend drainage with IR.   Would avoid TUR prostate given apical location near the sphincter and higher risk for urinary incontinence. Also high rate of ejaculatory dysfunction    - Continue abx  - Repeat blood cultures  - IR consult for aspiration of prostatic abscesses, do not need to drain smaller abscess near the base  - Start flomax and finasteride  - Continue darnell catheter for now  - Discussed with Dr. Olmos

## 2025-06-13 NOTE — PROGRESS NOTE ADULT - SUBJECTIVE AND OBJECTIVE BOX
CC: F/U for Bacteremia    Saw/spoke to patient. No fevers, no chills. No new complaints.    Allergies  No Known Allergies    ANTIMICROBIALS:  ceFAZolin   IVPB 2000 every 8 hours    PE:    Vital Signs Last 24 Hrs  T(C): 36.7 (13 Jun 2025 04:11), Max: 36.8 (12 Jun 2025 15:07)  T(F): 98.1 (13 Jun 2025 04:11), Max: 98.2 (12 Jun 2025 15:07)  HR: 83 (13 Jun 2025 04:11) (83 - 101)  BP: 141/96 (13 Jun 2025 04:11) (141/96 - 172/95)  BP(mean): 109 (13 Jun 2025 04:11) (109 - 109)  RR: 18 (13 Jun 2025 04:11) (18 - 23)  SpO2: 100% (13 Jun 2025 04:11) (98% - 100%)    Gen: AOx3, NAD, non-toxic  CV: Nontachycardic  Resp: Breathing comfortably, RA  Abd: Soft, nontender  IV/Skin: No thrombophlebitis    LABS:                        10.3   12.44 )-----------( 357      ( 13 Jun 2025 04:18 )             31.5     06-13    138  |  99  |  19  ----------------------------<  133[H]  3.6   |  26  |  0.92    Ca    9.2      13 Jun 2025 04:18  Phos  4.2     06-12  Mg     2.00     06-12    TPro  7.0  /  Alb  3.1[L]  /  TBili  0.8  /  DBili  x   /  AST  9   /  ALT  5   /  AlkPhos  61  06-13    Urinalysis Basic - ( 13 Jun 2025 04:18 )    Color: x / Appearance: x / SG: x / pH: x  Gluc: 133 mg/dL / Ketone: x  / Bili: x / Urobili: x   Blood: x / Protein: x / Nitrite: x   Leuk Esterase: x / RBC: x / WBC x   Sq Epi: x / Non Sq Epi: x / Bacteria: x    MICROBIOLOGY:    Blood Blood-Peripheral  06-10-25   Growth in anaerobic bottle: Staphylococcus aureus  Direct identification is available within approximately 3-5  hours either by Blood Panel Multiplexed PCR or Direct  MALDI-TOF. Details: [email or web address]  --  Blood Culture PCR    Blood Blood-Peripheral  06-10-25   No growth at 72 Hours  --  --    Clean Catch Clean Catch (Midstream)  06-09-25   >100,000 CFU/ml Staphylococcus aureus  --  Staphylococcus aureus    RADIOLOGY:    6/12    IMPRESSION:  *  Urinary bladder wall thickening and perivesical fat infiltration,   concerning for cystitis.  *  Several low-density peripherally enhancing lesions in the prostate and   at the junction of the prostate and right seminal vesicle, suspicious for   abscesses, the largest measuring 3.0 cm.  *  Cystic lesion in the right hemiscrotum measuring 2.6 cm. A scrotal   ultrasound can be performed for further evaluation.

## 2025-06-14 LAB
ALBUMIN SERPL ELPH-MCNC: 3.1 G/DL — LOW (ref 3.3–5)
ALP SERPL-CCNC: 59 U/L — SIGNIFICANT CHANGE UP (ref 40–120)
ALT FLD-CCNC: 6 U/L — SIGNIFICANT CHANGE UP (ref 4–41)
ANION GAP SERPL CALC-SCNC: 12 MMOL/L — SIGNIFICANT CHANGE UP (ref 7–14)
AST SERPL-CCNC: 16 U/L — SIGNIFICANT CHANGE UP (ref 4–40)
BASOPHILS # BLD AUTO: 0.06 K/UL — SIGNIFICANT CHANGE UP (ref 0–0.2)
BASOPHILS NFR BLD AUTO: 0.5 % — SIGNIFICANT CHANGE UP (ref 0–2)
BILIRUB SERPL-MCNC: 1.4 MG/DL — HIGH (ref 0.2–1.2)
BUN SERPL-MCNC: 20 MG/DL — SIGNIFICANT CHANGE UP (ref 7–23)
CALCIUM SERPL-MCNC: 9.4 MG/DL — SIGNIFICANT CHANGE UP (ref 8.4–10.5)
CHLORIDE SERPL-SCNC: 99 MMOL/L — SIGNIFICANT CHANGE UP (ref 98–107)
CO2 SERPL-SCNC: 25 MMOL/L — SIGNIFICANT CHANGE UP (ref 22–31)
CREAT SERPL-MCNC: 0.91 MG/DL — SIGNIFICANT CHANGE UP (ref 0.5–1.3)
CULTURE RESULTS: NO GROWTH — SIGNIFICANT CHANGE UP
EGFR: 99 ML/MIN/1.73M2 — SIGNIFICANT CHANGE UP
EGFR: 99 ML/MIN/1.73M2 — SIGNIFICANT CHANGE UP
EOSINOPHIL # BLD AUTO: 0.12 K/UL — SIGNIFICANT CHANGE UP (ref 0–0.5)
EOSINOPHIL NFR BLD AUTO: 1.1 % — SIGNIFICANT CHANGE UP (ref 0–6)
GLUCOSE BLDC GLUCOMTR-MCNC: 101 MG/DL — HIGH (ref 70–99)
GLUCOSE BLDC GLUCOMTR-MCNC: 121 MG/DL — HIGH (ref 70–99)
GLUCOSE BLDC GLUCOMTR-MCNC: 133 MG/DL — HIGH (ref 70–99)
GLUCOSE BLDC GLUCOMTR-MCNC: 141 MG/DL — HIGH (ref 70–99)
GLUCOSE BLDC GLUCOMTR-MCNC: 97 MG/DL — SIGNIFICANT CHANGE UP (ref 70–99)
GLUCOSE SERPL-MCNC: 75 MG/DL — SIGNIFICANT CHANGE UP (ref 70–99)
HCT VFR BLD CALC: 30.5 % — LOW (ref 39–50)
HGB BLD-MCNC: 10.1 G/DL — LOW (ref 13–17)
IANC: 6.11 K/UL — SIGNIFICANT CHANGE UP (ref 1.8–7.4)
IMM GRANULOCYTES NFR BLD AUTO: 0.7 % — SIGNIFICANT CHANGE UP (ref 0–0.9)
LYMPHOCYTES # BLD AUTO: 3.76 K/UL — HIGH (ref 1–3.3)
LYMPHOCYTES # BLD AUTO: 34.3 % — SIGNIFICANT CHANGE UP (ref 13–44)
MCHC RBC-ENTMCNC: 28.1 PG — SIGNIFICANT CHANGE UP (ref 27–34)
MCHC RBC-ENTMCNC: 33.1 G/DL — SIGNIFICANT CHANGE UP (ref 32–36)
MCV RBC AUTO: 85 FL — SIGNIFICANT CHANGE UP (ref 80–100)
MONOCYTES # BLD AUTO: 0.84 K/UL — SIGNIFICANT CHANGE UP (ref 0–0.9)
MONOCYTES NFR BLD AUTO: 7.7 % — SIGNIFICANT CHANGE UP (ref 2–14)
NEUTROPHILS # BLD AUTO: 6.11 K/UL — SIGNIFICANT CHANGE UP (ref 1.8–7.4)
NEUTROPHILS NFR BLD AUTO: 55.7 % — SIGNIFICANT CHANGE UP (ref 43–77)
NRBC # BLD AUTO: 0.02 K/UL — HIGH (ref 0–0)
NRBC # FLD: 0.02 K/UL — HIGH (ref 0–0)
NRBC BLD AUTO-RTO: 0 /100 WBCS — SIGNIFICANT CHANGE UP (ref 0–0)
PLATELET # BLD AUTO: 396 K/UL — SIGNIFICANT CHANGE UP (ref 150–400)
POTASSIUM SERPL-MCNC: 4.2 MMOL/L — SIGNIFICANT CHANGE UP (ref 3.5–5.3)
POTASSIUM SERPL-SCNC: 4.2 MMOL/L — SIGNIFICANT CHANGE UP (ref 3.5–5.3)
PROT SERPL-MCNC: 7.1 G/DL — SIGNIFICANT CHANGE UP (ref 6–8.3)
RBC # BLD: 3.59 M/UL — LOW (ref 4.2–5.8)
RBC # FLD: 11.9 % — SIGNIFICANT CHANGE UP (ref 10.3–14.5)
SODIUM SERPL-SCNC: 136 MMOL/L — SIGNIFICANT CHANGE UP (ref 135–145)
SPECIMEN SOURCE: SIGNIFICANT CHANGE UP
WBC # BLD: 10.97 K/UL — HIGH (ref 3.8–10.5)
WBC # FLD AUTO: 10.97 K/UL — HIGH (ref 3.8–10.5)

## 2025-06-14 PROCEDURE — 99232 SBSQ HOSP IP/OBS MODERATE 35: CPT

## 2025-06-14 RX ORDER — INSULIN LISPRO 100 U/ML
6 INJECTION, SOLUTION INTRAVENOUS; SUBCUTANEOUS
Refills: 0 | Status: DISCONTINUED | OUTPATIENT
Start: 2025-06-14 | End: 2025-06-16

## 2025-06-14 RX ADMIN — Medication 100 MILLIGRAM(S): at 06:05

## 2025-06-14 RX ADMIN — Medication 10 MILLIGRAM(S): at 06:06

## 2025-06-14 RX ADMIN — INSULIN LISPRO 6 UNIT(S): 100 INJECTION, SOLUTION INTRAVENOUS; SUBCUTANEOUS at 13:00

## 2025-06-14 RX ADMIN — Medication 100 MILLIGRAM(S): at 22:43

## 2025-06-14 RX ADMIN — Medication 1 APPLICATION(S): at 13:00

## 2025-06-14 RX ADMIN — INSULIN GLARGINE-YFGN 19 UNIT(S): 100 INJECTION, SOLUTION SUBCUTANEOUS at 16:29

## 2025-06-14 RX ADMIN — FINASTERIDE 5 MILLIGRAM(S): 1 TABLET, FILM COATED ORAL at 13:00

## 2025-06-14 RX ADMIN — TAMSULOSIN HYDROCHLORIDE 0.4 MILLIGRAM(S): 0.4 CAPSULE ORAL at 22:41

## 2025-06-14 RX ADMIN — INSULIN LISPRO 8 UNIT(S): 100 INJECTION, SOLUTION INTRAVENOUS; SUBCUTANEOUS at 09:28

## 2025-06-14 RX ADMIN — Medication 100 MILLIGRAM(S): at 13:47

## 2025-06-14 RX ADMIN — INSULIN LISPRO 6 UNIT(S): 100 INJECTION, SOLUTION INTRAVENOUS; SUBCUTANEOUS at 18:23

## 2025-06-14 NOTE — DIETITIAN INITIAL EVALUATION ADULT - PROBLEM SELECTOR PLAN 4
- Not on medications  - BP elevated, initially 176/100 in setting of urinary retention, after placement of adrnell still elevated to 161/93    -> Will start enalapril 5mg daily, uptitrate as needed

## 2025-06-14 NOTE — DIETITIAN INITIAL EVALUATION ADULT - OTHER CALCULATIONS
Goal Outcome Evaluation:              Outcome Evaluation: VSS, no acute distress noted, medicated for pain prn, up with PT   Dosing weight (6/11) 167.5 lbs / 76 kg.  No recent weight history available via chart.  Ideal Body Weight: 148 lbs / 67.3 kg +/-10%

## 2025-06-14 NOTE — PROGRESS NOTE ADULT - SUBJECTIVE AND OBJECTIVE BOX
PROGRESS NOTE:     CONTACT INFO:  Zaid Mcadams DO  available on teams    Patient is a 55y old  Male who presents with a chief complaint of Urinary retention, Sepsis due to UTI, Uncontrolled DM2 (14 Jun 2025 10:29)      SUBJECTIVE / OVERNIGHT EVENTS:  No acute events overnight. Patient seen and evaluated at bedside. No fever/chills.  Denies SOB at rest, chest pain, palpitations, abdominal pain, nausea/vomiting. Ho catheter in place, good urine output noted.     ADDITIONAL REVIEW OF SYSTEMS:    MEDICATIONS  (STANDING):  ceFAZolin   IVPB 2000 milliGRAM(s) IV Intermittent every 8 hours  chlorhexidine 2% Cloths 1 Application(s) Topical daily  dextrose 5%. 1000 milliLiter(s) (100 mL/Hr) IV Continuous <Continuous>  dextrose 5%. 1000 milliLiter(s) (50 mL/Hr) IV Continuous <Continuous>  dextrose 50% Injectable 25 Gram(s) IV Push once  dextrose 50% Injectable 12.5 Gram(s) IV Push once  dextrose 50% Injectable 25 Gram(s) IV Push once  enalapril 10 milliGRAM(s) Oral daily  finasteride 5 milliGRAM(s) Oral daily  glucagon  Injectable 1 milliGRAM(s) IntraMuscular once  insulin glargine Injectable (LANTUS) 19 Unit(s) SubCutaneous <User Schedule>  insulin lispro (ADMELOG) corrective regimen sliding scale   SubCutaneous three times a day before meals  insulin lispro (ADMELOG) corrective regimen sliding scale   SubCutaneous at bedtime  insulin lispro Injectable (ADMELOG) 6 Unit(s) SubCutaneous three times a day before meals  tamsulosin 0.4 milliGRAM(s) Oral at bedtime    MEDICATIONS  (PRN):  acetaminophen     Tablet .. 650 milliGRAM(s) Oral every 6 hours PRN Temp greater or equal to 38C (100.4F), Mild Pain (1 - 3)  dextrose Oral Gel 15 Gram(s) Oral once PRN Blood Glucose LESS THAN 70 milliGRAM(s)/deciliter      CAPILLARY BLOOD GLUCOSE      POCT Blood Glucose.: 97 mg/dL (14 Jun 2025 12:38)  POCT Blood Glucose.: 101 mg/dL (14 Jun 2025 09:17)  POCT Blood Glucose.: 159 mg/dL (13 Jun 2025 22:22)  POCT Blood Glucose.: 81 mg/dL (13 Jun 2025 18:03)    I&O's Summary    13 Jun 2025 07:01  -  14 Jun 2025 07:00  --------------------------------------------------------  IN: 0 mL / OUT: 2550 mL / NET: -2550 mL        PHYSICAL EXAM:  Vital Signs Last 24 Hrs  T(C): 37 (14 Jun 2025 05:15), Max: 37 (14 Jun 2025 05:15)  T(F): 98.6 (14 Jun 2025 05:15), Max: 98.6 (14 Jun 2025 05:15)  HR: 84 (14 Jun 2025 05:15) (84 - 99)  BP: 126/93 (14 Jun 2025 05:15) (126/93 - 147/80)  BP(mean): --  RR: 17 (14 Jun 2025 05:15) (16 - 17)  SpO2: 100% (14 Jun 2025 05:15) (100% - 100%)    Parameters below as of 14 Jun 2025 05:15  Patient On (Oxygen Delivery Method): room air        CONSTITUTIONAL: NAD, well-developed  RESPIRATORY: Normal respiratory effort; lungs are clear to auscultation bilaterally  CARDIOVASCULAR: Regular rate and rhythm, normal S1 and S2, no murmur/rub/gallop; No lower extremity edema; Peripheral pulses are 2+ bilaterally  ABDOMEN: Nontender to palpation, normoactive bowel sounds  MUSCLOSKELETAL: no clubbing or cyanosis of digits; no joint swelling or tenderness to palpation  PSYCH: A+O to person, place, and time; affect appropriate    LABS:                        10.1   10.97 )-----------( 396      ( 14 Jun 2025 06:18 )             30.5     06-14    136  |  99  |  20  ----------------------------<  75  4.2   |  25  |  0.91    Ca    9.4      14 Jun 2025 06:18    TPro  7.1  /  Alb  3.1[L]  /  TBili  1.4[H]  /  DBili  x   /  AST  16  /  ALT  6   /  AlkPhos  59  06-14          Urinalysis Basic - ( 14 Jun 2025 06:18 )    Color: x / Appearance: x / SG: x / pH: x  Gluc: 75 mg/dL / Ketone: x  / Bili: x / Urobili: x   Blood: x / Protein: x / Nitrite: x   Leuk Esterase: x / RBC: x / WBC x   Sq Epi: x / Non Sq Epi: x / Bacteria: x        Urinalysis with Rflx Culture (collected 13 Jun 2025 01:01)        RADIOLOGY & ADDITIONAL TESTS:  Results Reviewed:   Imaging Personally Reviewed:  Electrocardiogram Personally Reviewed:    COORDINATION OF CARE:  Care Discussed with Consultants/Other Providers [Y/N]:  Prior or Outpatient Records Reviewed [Y/N]:

## 2025-06-14 NOTE — CHART NOTE - NSCHARTNOTEFT_GEN_A_CORE
55M with history of DM2 and HTN (not on medications currently for either) who presents to the hospital with complaints of difficulty urinating since last night. Said that for the past 1 week he has been having urinary frequency, urgency, and dysuria.      PLAN: Adjusted to admelog 6 units before meals  c/w lantus 19 units at 3pm. move forward tomorrow to 5 pm  C/w current correction scales      CAPILLARY BLOOD GLUCOSE  POCT Blood Glucose.: 101 mg/dL (14 Jun 2025 09:17)  POCT Blood Glucose.: 159 mg/dL (13 Jun 2025 22:22)  POCT Blood Glucose.: 81 mg/dL (13 Jun 2025 18:03)      06-14    136  |  99  |  20  ----------------------------<  75  4.2   |  25  |  0.91    Ca    9.4      14 Jun 2025 06:18    TPro  7.1  /  Alb  3.1[L]  /  TBili  1.4[H]  /  DBili  x   /  AST  16  /  ALT  6   /  AlkPhos  59  06-14      A1C with Estimated Average Glucose (06.09.25 @ 03:29)    A1C with Estimated Average Glucose Result: 13.0: High Risk (prediabetic)    5.7 - 6.4 %  Diabetic, diagnostic           > 6.5 %  ADA diabetic treatment goal    < 7.0 %  HbA1C values may not accurately reflect mean blood glucose in patients  with Hb variants.  Suggest clinical correlation. %    Estimated Average Glucose: 326      MEDICATIONS  (STANDING):  ceFAZolin   IVPB 2000 milliGRAM(s) IV Intermittent every 8 hours  chlorhexidine 2% Cloths 1 Application(s) Topical daily  dextrose 5%. 1000 milliLiter(s) (100 mL/Hr) IV Continuous <Continuous>  dextrose 5%. 1000 milliLiter(s) (50 mL/Hr) IV Continuous <Continuous>  dextrose 50% Injectable 25 Gram(s) IV Push once  dextrose 50% Injectable 12.5 Gram(s) IV Push once  dextrose 50% Injectable 25 Gram(s) IV Push once  enalapril 10 milliGRAM(s) Oral daily  finasteride 5 milliGRAM(s) Oral daily  glucagon  Injectable 1 milliGRAM(s) IntraMuscular once  insulin glargine Injectable (LANTUS) 19 Unit(s) SubCutaneous <User Schedule>  insulin lispro (ADMELOG) corrective regimen sliding scale   SubCutaneous three times a day before meals  insulin lispro (ADMELOG) corrective regimen sliding scale   SubCutaneous at bedtime  insulin lispro Injectable (ADMELOG) 6 Unit(s) SubCutaneous three times a day before meals  tamsulosin 0.4 milliGRAM(s) Oral at bedtime    Chloe Sy  Nurse Practitioner  Division of Endocrinology & Diabetes  Available via  Teams    Another provider will cover patient tomorrow  If after 6PM or before 9AM, or on weekends/holidays, please call endocrine answering service for assistance ([phone]).  For nonurgent matters email [email or web address] for assistance.

## 2025-06-14 NOTE — PROGRESS NOTE ADULT - SUBJECTIVE AND OBJECTIVE BOX
Subjective:  Resting comfortably, darnell draining, eating and drinking ok    Objective:    Vital signs  T(C): , Max: 37 (06-14-25 @ 05:15)  HR: 84 (06-14-25 @ 05:15)  BP: 126/93 (06-14-25 @ 05:15)  SpO2: 100% (06-14-25 @ 05:15)  Wt(kg): --    Output     06-13 @ 07:01  -  06-14 @ 07:00  --------------------------------------------------------  IN: 0 mL / OUT: 2550 mL / NET: -2550 mL        Gen: NAD  Abd: soft, nontender, nondistended  : darnell secured in place, draining CYU    Labs                        10.1   10.97 )-----------( 396      ( 14 Jun 2025 06:18 )             30.5     14 Jun 2025 06:18    136    |  99     |  20     ----------------------------<  75     4.2     |  25     |  0.91     Ca    9.4        14 Jun 2025 06:18        Urine Cx: ?  Blood Cx: ?      Imaging

## 2025-06-14 NOTE — DIETITIAN INITIAL EVALUATION ADULT - PERTINENT LABORATORY DATA
(6/14) Na 136, BUN 20, Cr 0.91, BG 75, K+ 4.2, Alk Phos 59, ALT/SGPT 6, AST/SGOT 16  (6/9) HbA1c 13.0%[H]  POCT: (6/13)

## 2025-06-14 NOTE — DIETITIAN INITIAL EVALUATION ADULT - ENTER FROM (CAL/KG)
Third attempt to contact patient in regards to imaging results. Letter will be sent out today. Next appointment is scheduled for 04/18/2022 @3:30p.mBoy with Iram [place]    ----- Message from ZAC Garcia sent at 3/11/2022 11:58 AM PST -----  Noted, refer to perinatology for follow up imaging.     
20

## 2025-06-14 NOTE — PROGRESS NOTE ADULT - ASSESSMENT
56yo M presenting with primary urinary complaints found to have prostatic abscess on imaging. CT scan shows two large 3cm abscesses in the apex of the prostate and a smaller abscess near the base.     Given that patient is bacteremic and size of abscesses on imaging recommend drainage with IR.   Would avoid TUR prostate given apical location near the sphincter and higher risk for urinary incontinence. Also high rate of ejaculatory dysfunction    Recommendations:  - Continue abx  - Repeat blood cultures  - IR consult for aspiration of prostatic abscesses, do not need to drain smaller abscess near the base  - Start flomax and finasteride  - Continue darnell catheter for now  - Discussed with Dr. Olmos

## 2025-06-14 NOTE — CONSULT NOTE ADULT - ASSESSMENT
Interventional Radiology    Evaluate for Procedure:     HPI: 56yo M presenting with primary urinary complaints found to have prostatic abscess on imaging. CT demonstrates fluid collections within the prostate c/f abscess. IR c/s for drainage.    Allergies: No Known Allergies    Medications (Abx/Cardiac/Anticoagulation/Blood Products)  ceFAZolin   IVPB: 100 mL/Hr IV Intermittent (06-14 @ 13:47)  enalapril: 10 milliGRAM(s) Oral (06-14 @ 06:06)    Data:    T(C): 37  HR: 84  BP: 126/93  RR: 17  SpO2: 100%    -WBC 10.97 / HgB 10.1 / Hct 30.5 / Plt 396  -Na 136 / Cl 99 / BUN 20 / Glucose 75  -K 4.2 / CO2 25 / Cr 0.91  -ALT 6 / Alk Phos 59 / T.Bili 1.4      Radiology:     Assessment/Plan:   56yo M presenting with primary urinary complaints found to have prostatic abscess on imaging. CT demonstrates fluid collections within the prostate c/f abscess. IR c/s for drainage.      -- Fluid collections relatively small for drain placement. Given patient has positive blood cultures, low yield for diagnostic aspiration given bug of concern likely identified. As such recommend conservative management at this time. If patient does not improve, clinically worsens, recommend re-imaging and can re-eval at that time. Please reach out with any questions or concerns.    Nimesh Gonzales M.D.  PGY5/R4, Interventional Radiology Senior Resident    -Available on [employer] TEAMS for all non-urgent questions  -Emergent issues: [facility]-p.[phone]; [facility]-p.[phone] ([phone])  -Non-emergent consults: Please place a [place] order "Consult-Interventional Radiology" with an appropriate callback number  -Scheduling questions: [facility]: [phone]; [facility]: [phone]  -Clinic/Outpatient booking: [facility]: [phone]; [facility]: [phone]

## 2025-06-14 NOTE — DIETITIAN INITIAL EVALUATION ADULT - PERSON TAUGHT/METHOD
Pt demonstrates limited interest in nutrition education however was amenable to provision of written/brief verbal DM education. Topics include: avoidance of sugar sweetened beverages and recommended alternatives, sources of carbohydrates, mixed meals (pairing protein with carbohydrate for optimal blood glucose response), hypoglycemia prevention/management and timing of meals/snacks.

## 2025-06-14 NOTE — DIETITIAN INITIAL EVALUATION ADULT - OTHER INFO
Per chart, pt is 55 year old male PMH type 2 DM, HTN presenting with difficulty urinating found to have sepsis due to UTI and uncontrolled DM with significant hyperglycemia. Endocrine, Urology and ID on board. Per chart, pt is 55 year old male PMH type 2 DM, HTN presenting with difficulty urinating found to have sepsis due to UTI and uncontrolled DM with significant hyperglycemia. Endocrine, Urology and ID on board.    Pt confirms NKFA, denies difficulties chewing/swallowing. Pt lives at home alone, prepares meals independently. History of type 2 DM for ~5 years, HbA1c (6/9) 13.0% indicating poor control. Pt nonadherent to medications (Metformin) for years PTA. Pt does not monitor fingersticks. Pt with limited adherence to therapeutic diet, intake notable for frequent consumption homemade fruit juices.    Pt reports good appetite/PO intake in house, tolerating diet. No food preferences vocalized at this time. Pt is able to feed self independently. No noted GI distress, unknown last BM; no bowel regimen ordered at this time. Fingersticks elevated. Likely plan for discharge home on Metformin + basal/bolus insulin, per Endocrinology.

## 2025-06-15 LAB
ALBUMIN SERPL ELPH-MCNC: 2.7 G/DL — LOW (ref 3.3–5)
ALP SERPL-CCNC: 52 U/L — SIGNIFICANT CHANGE UP (ref 40–120)
ALT FLD-CCNC: 5 U/L — SIGNIFICANT CHANGE UP (ref 4–41)
ANION GAP SERPL CALC-SCNC: 10 MMOL/L — SIGNIFICANT CHANGE UP (ref 7–14)
ANION GAP SERPL CALC-SCNC: 17 MMOL/L — HIGH (ref 7–14)
AST SERPL-CCNC: 9 U/L — SIGNIFICANT CHANGE UP (ref 4–40)
BASOPHILS # BLD AUTO: 0.05 K/UL — SIGNIFICANT CHANGE UP (ref 0–0.2)
BASOPHILS NFR BLD AUTO: 0.5 % — SIGNIFICANT CHANGE UP (ref 0–2)
BILIRUB SERPL-MCNC: 0.9 MG/DL — SIGNIFICANT CHANGE UP (ref 0.2–1.2)
BLD GP AB SCN SERPL QL: NEGATIVE — SIGNIFICANT CHANGE UP
BUN SERPL-MCNC: 18 MG/DL — SIGNIFICANT CHANGE UP (ref 7–23)
BUN SERPL-MCNC: 19 MG/DL — SIGNIFICANT CHANGE UP (ref 7–23)
CALCIUM SERPL-MCNC: 7.7 MG/DL — LOW (ref 8.4–10.5)
CALCIUM SERPL-MCNC: 8.9 MG/DL — SIGNIFICANT CHANGE UP (ref 8.4–10.5)
CHLORIDE SERPL-SCNC: 101 MMOL/L — SIGNIFICANT CHANGE UP (ref 98–107)
CHLORIDE SERPL-SCNC: 87 MMOL/L — LOW (ref 98–107)
CO2 SERPL-SCNC: 25 MMOL/L — SIGNIFICANT CHANGE UP (ref 22–31)
CO2 SERPL-SCNC: 26 MMOL/L — SIGNIFICANT CHANGE UP (ref 22–31)
CREAT SERPL-MCNC: 0.86 MG/DL — SIGNIFICANT CHANGE UP (ref 0.5–1.3)
CREAT SERPL-MCNC: 0.93 MG/DL — SIGNIFICANT CHANGE UP (ref 0.5–1.3)
CULTURE RESULTS: SIGNIFICANT CHANGE UP
EGFR: 102 ML/MIN/1.73M2 — SIGNIFICANT CHANGE UP
EGFR: 102 ML/MIN/1.73M2 — SIGNIFICANT CHANGE UP
EGFR: 96 ML/MIN/1.73M2 — SIGNIFICANT CHANGE UP
EGFR: 96 ML/MIN/1.73M2 — SIGNIFICANT CHANGE UP
EOSINOPHIL # BLD AUTO: 0.14 K/UL — SIGNIFICANT CHANGE UP (ref 0–0.5)
EOSINOPHIL NFR BLD AUTO: 1.5 % — SIGNIFICANT CHANGE UP (ref 0–6)
GLUCOSE BLDC GLUCOMTR-MCNC: 167 MG/DL — HIGH (ref 70–99)
GLUCOSE BLDC GLUCOMTR-MCNC: 173 MG/DL — HIGH (ref 70–99)
GLUCOSE BLDC GLUCOMTR-MCNC: 180 MG/DL — HIGH (ref 70–99)
GLUCOSE BLDC GLUCOMTR-MCNC: 182 MG/DL — HIGH (ref 70–99)
GLUCOSE SERPL-MCNC: 150 MG/DL — HIGH (ref 70–99)
GLUCOSE SERPL-MCNC: 392 MG/DL — HIGH (ref 70–99)
HCT VFR BLD CALC: 22.8 % — LOW (ref 39–50)
HCT VFR BLD CALC: 33.3 % — LOW (ref 39–50)
HGB BLD-MCNC: 10.7 G/DL — LOW (ref 13–17)
HGB BLD-MCNC: 7.3 G/DL — LOW (ref 13–17)
IANC: 5.49 K/UL — SIGNIFICANT CHANGE UP (ref 1.8–7.4)
IMM GRANULOCYTES NFR BLD AUTO: 0.7 % — SIGNIFICANT CHANGE UP (ref 0–0.9)
LYMPHOCYTES # BLD AUTO: 3.02 K/UL — SIGNIFICANT CHANGE UP (ref 1–3.3)
LYMPHOCYTES # BLD AUTO: 31.6 % — SIGNIFICANT CHANGE UP (ref 13–44)
MCHC RBC-ENTMCNC: 27.9 PG — SIGNIFICANT CHANGE UP (ref 27–34)
MCHC RBC-ENTMCNC: 28.9 PG — SIGNIFICANT CHANGE UP (ref 27–34)
MCHC RBC-ENTMCNC: 32 G/DL — SIGNIFICANT CHANGE UP (ref 32–36)
MCHC RBC-ENTMCNC: 32.1 G/DL — SIGNIFICANT CHANGE UP (ref 32–36)
MCV RBC AUTO: 86.9 FL — SIGNIFICANT CHANGE UP (ref 80–100)
MCV RBC AUTO: 90.1 FL — SIGNIFICANT CHANGE UP (ref 80–100)
MONOCYTES # BLD AUTO: 0.78 K/UL — SIGNIFICANT CHANGE UP (ref 0–0.9)
MONOCYTES NFR BLD AUTO: 8.2 % — SIGNIFICANT CHANGE UP (ref 2–14)
NEUTROPHILS # BLD AUTO: 5.49 K/UL — SIGNIFICANT CHANGE UP (ref 1.8–7.4)
NEUTROPHILS NFR BLD AUTO: 57.5 % — SIGNIFICANT CHANGE UP (ref 43–77)
NRBC # BLD AUTO: 0 K/UL — SIGNIFICANT CHANGE UP (ref 0–0)
NRBC # BLD AUTO: 0 K/UL — SIGNIFICANT CHANGE UP (ref 0–0)
NRBC # FLD: 0 K/UL — SIGNIFICANT CHANGE UP (ref 0–0)
NRBC # FLD: 0 K/UL — SIGNIFICANT CHANGE UP (ref 0–0)
NRBC BLD AUTO-RTO: 0 /100 WBCS — SIGNIFICANT CHANGE UP (ref 0–0)
NRBC BLD AUTO-RTO: 0 /100 WBCS — SIGNIFICANT CHANGE UP (ref 0–0)
PLATELET # BLD AUTO: 327 K/UL — SIGNIFICANT CHANGE UP (ref 150–400)
PLATELET # BLD AUTO: 459 K/UL — HIGH (ref 150–400)
POTASSIUM SERPL-MCNC: 3.6 MMOL/L — SIGNIFICANT CHANGE UP (ref 3.5–5.3)
POTASSIUM SERPL-MCNC: 4.2 MMOL/L — SIGNIFICANT CHANGE UP (ref 3.5–5.3)
POTASSIUM SERPL-SCNC: 3.6 MMOL/L — SIGNIFICANT CHANGE UP (ref 3.5–5.3)
POTASSIUM SERPL-SCNC: 4.2 MMOL/L — SIGNIFICANT CHANGE UP (ref 3.5–5.3)
PROT SERPL-MCNC: 6.2 G/DL — SIGNIFICANT CHANGE UP (ref 6–8.3)
RBC # BLD: 2.53 M/UL — LOW (ref 4.2–5.8)
RBC # BLD: 3.83 M/UL — LOW (ref 4.2–5.8)
RBC # FLD: 12.4 % — SIGNIFICANT CHANGE UP (ref 10.3–14.5)
RBC # FLD: 12.5 % — SIGNIFICANT CHANGE UP (ref 10.3–14.5)
RH IG SCN BLD-IMP: POSITIVE — SIGNIFICANT CHANGE UP
SODIUM SERPL-SCNC: 129 MMOL/L — LOW (ref 135–145)
SODIUM SERPL-SCNC: 137 MMOL/L — SIGNIFICANT CHANGE UP (ref 135–145)
SPECIMEN SOURCE: SIGNIFICANT CHANGE UP
WBC # BLD: 11.74 K/UL — HIGH (ref 3.8–10.5)
WBC # BLD: 9.55 K/UL — SIGNIFICANT CHANGE UP (ref 3.8–10.5)
WBC # FLD AUTO: 11.74 K/UL — HIGH (ref 3.8–10.5)
WBC # FLD AUTO: 9.55 K/UL — SIGNIFICANT CHANGE UP (ref 3.8–10.5)

## 2025-06-15 PROCEDURE — 99232 SBSQ HOSP IP/OBS MODERATE 35: CPT

## 2025-06-15 RX ORDER — INSULIN GLARGINE-YFGN 100 [IU]/ML
19 INJECTION, SOLUTION SUBCUTANEOUS
Refills: 0 | Status: DISCONTINUED | OUTPATIENT
Start: 2025-06-15 | End: 2025-06-16

## 2025-06-15 RX ADMIN — INSULIN LISPRO 1: 100 INJECTION, SOLUTION INTRAVENOUS; SUBCUTANEOUS at 13:06

## 2025-06-15 RX ADMIN — INSULIN LISPRO 6 UNIT(S): 100 INJECTION, SOLUTION INTRAVENOUS; SUBCUTANEOUS at 17:56

## 2025-06-15 RX ADMIN — Medication 10 MILLIGRAM(S): at 05:58

## 2025-06-15 RX ADMIN — INSULIN LISPRO 6 UNIT(S): 100 INJECTION, SOLUTION INTRAVENOUS; SUBCUTANEOUS at 13:06

## 2025-06-15 RX ADMIN — Medication 1 APPLICATION(S): at 12:27

## 2025-06-15 RX ADMIN — INSULIN LISPRO 6 UNIT(S): 100 INJECTION, SOLUTION INTRAVENOUS; SUBCUTANEOUS at 09:02

## 2025-06-15 RX ADMIN — Medication 100 MILLIGRAM(S): at 05:56

## 2025-06-15 RX ADMIN — FINASTERIDE 5 MILLIGRAM(S): 1 TABLET, FILM COATED ORAL at 12:27

## 2025-06-15 RX ADMIN — Medication 100 MILLIGRAM(S): at 14:16

## 2025-06-15 RX ADMIN — INSULIN LISPRO 1: 100 INJECTION, SOLUTION INTRAVENOUS; SUBCUTANEOUS at 09:02

## 2025-06-15 RX ADMIN — INSULIN GLARGINE-YFGN 19 UNIT(S): 100 INJECTION, SOLUTION SUBCUTANEOUS at 17:54

## 2025-06-15 RX ADMIN — TAMSULOSIN HYDROCHLORIDE 0.4 MILLIGRAM(S): 0.4 CAPSULE ORAL at 22:21

## 2025-06-15 RX ADMIN — Medication 100 MILLIGRAM(S): at 22:21

## 2025-06-15 RX ADMIN — INSULIN LISPRO 1: 100 INJECTION, SOLUTION INTRAVENOUS; SUBCUTANEOUS at 17:56

## 2025-06-15 NOTE — PROGRESS NOTE ADULT - ATTENDING COMMENTS
Agree with above  Imaging reviewed. Large apical abscesses  Recommend aspiration (IR consult). TURP with risk of bleeding, incontinence and high risk of ejaculatory dysfunction (patient young)  Continue darnell  Continue abx
Agree with above  WBC stable. Cr normal  IR note reviewed - no plan for intervention as patient is stable as has culture results  Plan for abx only  Keep darnell for now

## 2025-06-15 NOTE — PROGRESS NOTE ADULT - ASSESSMENT
54yo M presenting with primary urinary complaints found to have prostatic abscess on imaging. CT scan shows two large 3cm abscesses in the apex of the prostate and a smaller abscess near the base.     Given that patient is bacteremic and size of abscesses on imaging recommend drainage with IR.   Would avoid TUR prostate given apical location near the sphincter and higher risk for urinary incontinence. Also high rate of ejaculatory dysfunction.    Recommendations:  - Continue abx  - Repeat blood cultures  - Per IR, fluid collections too small for drain placement and recommended conservative management at this time, can re-image/re-consult if pt does not improve or clinically worsens  - Continue flomax and finasteride  - Continue darnell catheter for now  - Discussed with Dr. Olmos

## 2025-06-15 NOTE — PROGRESS NOTE ADULT - SUBJECTIVE AND OBJECTIVE BOX
PROGRESS NOTE:     CONTACT INFO:  Zaid Mcadams DO  available on teams    Patient is a 55y old  Male who presents with a chief complaint of Urinary retention, Sepsis due to UTI, Uncontrolled DM2 (15 Noble 2025 06:42)      SUBJECTIVE / OVERNIGHT EVENTS:  No acute events overnight. Patient seen and evaluated at bedside. No fever/chills.  Denies SOB at rest, chest pain, palpitations, abdominal pain, nausea/vomiting. urinating appropriately    ADDITIONAL REVIEW OF SYSTEMS:    MEDICATIONS  (STANDING):  ceFAZolin   IVPB 2000 milliGRAM(s) IV Intermittent every 8 hours  chlorhexidine 2% Cloths 1 Application(s) Topical daily  dextrose 5%. 1000 milliLiter(s) (100 mL/Hr) IV Continuous <Continuous>  dextrose 5%. 1000 milliLiter(s) (50 mL/Hr) IV Continuous <Continuous>  dextrose 50% Injectable 25 Gram(s) IV Push once  dextrose 50% Injectable 12.5 Gram(s) IV Push once  dextrose 50% Injectable 25 Gram(s) IV Push once  enalapril 10 milliGRAM(s) Oral daily  finasteride 5 milliGRAM(s) Oral daily  glucagon  Injectable 1 milliGRAM(s) IntraMuscular once  insulin glargine Injectable (LANTUS) 19 Unit(s) SubCutaneous <User Schedule>  insulin lispro (ADMELOG) corrective regimen sliding scale   SubCutaneous three times a day before meals  insulin lispro (ADMELOG) corrective regimen sliding scale   SubCutaneous at bedtime  insulin lispro Injectable (ADMELOG) 6 Unit(s) SubCutaneous three times a day before meals  tamsulosin 0.4 milliGRAM(s) Oral at bedtime    MEDICATIONS  (PRN):  acetaminophen     Tablet .. 650 milliGRAM(s) Oral every 6 hours PRN Temp greater or equal to 38C (100.4F), Mild Pain (1 - 3)  dextrose Oral Gel 15 Gram(s) Oral once PRN Blood Glucose LESS THAN 70 milliGRAM(s)/deciliter      CAPILLARY BLOOD GLUCOSE      POCT Blood Glucose.: 180 mg/dL (15 Noble 2025 12:26)  POCT Blood Glucose.: 173 mg/dL (15 Noble 2025 08:52)  POCT Blood Glucose.: 141 mg/dL (14 Jun 2025 22:39)  POCT Blood Glucose.: 133 mg/dL (14 Jun 2025 18:22)    I&O's Summary    14 Jun 2025 07:01  -  15 Noble 2025 07:00  --------------------------------------------------------  IN: 0 mL / OUT: 3100 mL / NET: -3100 mL    15 Noble 2025 07:01  -  15 Noble 2025 17:17  --------------------------------------------------------  IN: 0 mL / OUT: 400 mL / NET: -400 mL        PHYSICAL EXAM:  Vital Signs Last 24 Hrs  T(C): 36.4 (15 Noble 2025 15:21), Max: 36.8 (14 Jun 2025 23:00)  T(F): 97.5 (15 Noble 2025 15:21), Max: 98.3 (14 Jun 2025 23:00)  HR: 86 (15 Noble 2025 15:21) (83 - 100)  BP: 121/73 (15 Noble 2025 15:21) (119/74 - 141/92)  BP(mean): --  RR: 18 (15 Noble 2025 15:21) (17 - 18)  SpO2: 100% (15 Noble 2025 15:21) (99% - 100%)    Parameters below as of 15 Noble 2025 15:21  Patient On (Oxygen Delivery Method): room air        CONSTITUTIONAL: NAD, well-developed  RESPIRATORY: Normal respiratory effort;  CARDIOVASCULAR: Regular rate and rhythm, normal S1 and S2,  ABDOMEN: Nontender to palpation, normoactive bowel sounds, darnell in palce  MUSCLOSKELETAL: no clubbing or cyanosis of digits; no joint swelling or tenderness to palpation  PSYCH: A+O to person, place, and time; affect appropriate    LABS:                        10.7   11.74 )-----------( 459      ( 15 Noble 2025 09:50 )             33.3     06-15    137  |  101  |  19  ----------------------------<  150[H]  4.2   |  26  |  0.93    Ca    8.9      15 Noble 2025 09:50    TPro  6.2  /  Alb  2.7[L]  /  TBili  0.9  /  DBili  x   /  AST  9   /  ALT  5   /  AlkPhos  52  06-15          Urinalysis Basic - ( 15 Noble 2025 09:50 )    Color: x / Appearance: x / SG: x / pH: x  Gluc: 150 mg/dL / Ketone: x  / Bili: x / Urobili: x   Blood: x / Protein: x / Nitrite: x   Leuk Esterase: x / RBC: x / WBC x   Sq Epi: x / Non Sq Epi: x / Bacteria: x        Culture - Blood (collected 14 Jun 2025 07:29)  Source: Blood Blood-Peripheral  Preliminary Report (15 Noble 2025 11:01):    No growth at 24 hours    Culture - Blood (collected 14 Jun 2025 07:09)  Source: Blood Blood  Preliminary Report (15 Noble 2025 11:01):    No growth at 24 hours    Urinalysis with Rflx Culture (collected 13 Jun 2025 01:01)    Culture - Urine (collected 13 Jun 2025 01:01)  Source: Clean Catch  Final Report (14 Jun 2025 16:26):    No growth        RADIOLOGY & ADDITIONAL TESTS:  Results Reviewed:   Imaging Personally Reviewed:  Electrocardiogram Personally Reviewed:    COORDINATION OF CARE:  Care Discussed with Consultants/Other Providers [Y/N]:  Prior or Outpatient Records Reviewed [Y/N]:

## 2025-06-15 NOTE — PROGRESS NOTE ADULT - SUBJECTIVE AND OBJECTIVE BOX
UROLOGY PROGRESS NOTE    24 HOUR/OVERNIGHT EVENTS:    SUBJECTIVE:  Patient seen and examined at bedside. Tolerating diet without n/v. Denies fevers, chills, SOB, CP.      OBJECTIVE:    Vital Signs:  T(F): , Max: 98.5 (06-14-25 @ 15:43)  HR: 83 (06-15-25 @ 05:34)  BP: 119/74 (06-15-25 @ 05:34)  SpO2: 99% (06-15-25 @ 05:34)  Wt(kg): --    Output:     OUT:    Indwelling Catheter - Urethral (mL): 2550 mL  Total OUT: 2550 mL    Total NET: -2550 mL      OUT:    Indwelling Catheter - Urethral (mL): 3100 mL  Total OUT: 3100 mL    Total NET: -3100 mL          Physical Exam:  Gen: NAD  Abd: Soft, nontender, nondistended  : Ho secured in place, draining CYU    Medications  MEDICATIONS  (STANDING):  ceFAZolin   IVPB 2000 milliGRAM(s) IV Intermittent every 8 hours  chlorhexidine 2% Cloths 1 Application(s) Topical daily  dextrose 5%. 1000 milliLiter(s) (100 mL/Hr) IV Continuous <Continuous>  dextrose 5%. 1000 milliLiter(s) (50 mL/Hr) IV Continuous <Continuous>  dextrose 50% Injectable 25 Gram(s) IV Push once  dextrose 50% Injectable 12.5 Gram(s) IV Push once  dextrose 50% Injectable 25 Gram(s) IV Push once  enalapril 10 milliGRAM(s) Oral daily  finasteride 5 milliGRAM(s) Oral daily  glucagon  Injectable 1 milliGRAM(s) IntraMuscular once  insulin glargine Injectable (LANTUS) 19 Unit(s) SubCutaneous <User Schedule>  insulin lispro (ADMELOG) corrective regimen sliding scale   SubCutaneous three times a day before meals  insulin lispro (ADMELOG) corrective regimen sliding scale   SubCutaneous at bedtime  insulin lispro Injectable (ADMELOG) 6 Unit(s) SubCutaneous three times a day before meals  tamsulosin 0.4 milliGRAM(s) Oral at bedtime    MEDICATIONS  (PRN):  acetaminophen     Tablet .. 650 milliGRAM(s) Oral every 6 hours PRN Temp greater or equal to 38C (100.4F), Mild Pain (1 - 3)  dextrose Oral Gel 15 Gram(s) Oral once PRN Blood Glucose LESS THAN 70 milliGRAM(s)/deciliter      LABS:      06-14 @ 06:18    WBC 10.97 / Hct 30.5  / SCr 0.91         Urinalysis with Rflx Culture (collected 06-13-25 @ 01:01)    Culture - Urine (collected 06-13-25 @ 01:01)  Source: Clean Catch  Final Report (06-14-25 @ 16:26):    No growth    Culture - Blood (collected 06-10-25 @ 09:47)  Source: Blood Blood-Peripheral  Gram Stain (06-12-25 @ 04:49):    Growth in anaerobic bottle: Gram Positive Cocci in Clusters  Final Report (06-13-25 @ 16:27):    Growth in anaerobic bottle: Staphylococcus aureus    Direct identification is available within approximately 3-5    hours either by Blood Panel Multiplexed PCR or Direct    MALDI-TOF. Details: [email or web address]  Organism: Blood Culture PCR  Staphylococcus aureus (06-13-25 @ 16:27)  Organism: Blood Culture PCR (06-13-25 @ 16:27)      Method Type: PCR      -  Methicillin SENSITIVE Staphylococcus aureus (MSSA): Detec Any isolate of Staphylococcus aureus from a blood culture is NOT considered a contaminant.  Organism: Staphylococcus aureus (06-13-25 @ 16:27)      -  Clindamycin: S <=0.25      -  Oxacillin: S <=0.25 Oxacillin predicts susceptibility for dicloxacillin, methicillin, and nafcillin      -  Gentamicin: S <=4 Should not be used as monotherapy      -  Vancomycin: S 1      -  Tetracycline: S <=4      Method Type: WILL      -  Rifampin: S <=1 Should not be used as monotherapy      -  Erythromycin: S <=0.25      -  Trimethoprim/Sulfamethoxazole: S <=0.5/9.5    Culture - Blood (collected 06-10-25 @ 09:40)  Source: Blood Blood-Peripheral  Preliminary Report (06-14-25 @ 13:01):    No growth at 4 days    Culture - Urine (collected 06-09-25 @ 03:27)  Source: Clean Catch Clean Catch (Midstream)  Final Report (06-12-25 @ 09:52):    >100,000 CFU/ml Staphylococcus aureus  Organism: Staphylococcus aureus (06-12-25 @ 09:52)  Organism: Staphylococcus aureus (06-12-25 @ 09:52)      -  Nitrofurantoin: S <=32      -  Oxacillin: S <=0.25 Oxacillin predicts susceptibility for dicloxacillin, methicillin, and nafcillin      -  Gentamicin: S <=4 Should not be used as monotherapy      -  Vancomycin: S 0.5      -  Tetracycline: S <=4      Method Type: WILL      -  Rifampin: S <=1 Should not be used as monotherapy      -  Trimethoprim/Sulfamethoxazole: S <=0.5/9.5          RADIOLOGY:                   UROLOGY PROGRESS NOTE    24 HOUR/OVERNIGHT EVENTS:    SUBJECTIVE:  Patient seen and examined at bedside. Feels "pretty good overall." Tolerating diet without n/v. Denies fevers, chills, SOB, CP.  States he had TOV on Tuesday and failed and had catheter replaced on Thursday, has been having regular bowel movements since then, would like to try having catheter removed again    OBJECTIVE:    Vital Signs:  T(F): , Max: 98.5 (06-14-25 @ 15:43)  HR: 83 (06-15-25 @ 05:34)  BP: 119/74 (06-15-25 @ 05:34)  SpO2: 99% (06-15-25 @ 05:34)  Wt(kg): --    Output:     OUT:    Indwelling Catheter - Urethral (mL): 2550 mL  Total OUT: 2550 mL    Total NET: -2550 mL      OUT:    Indwelling Catheter - Urethral (mL): 3100 mL  Total OUT: 3100 mL    Total NET: -3100 mL          Physical Exam:  Gen: NAD  Abd: Soft, nontender, nondistended  : Ho secured in place, draining CYU    Medications  MEDICATIONS  (STANDING):  ceFAZolin   IVPB 2000 milliGRAM(s) IV Intermittent every 8 hours  chlorhexidine 2% Cloths 1 Application(s) Topical daily  dextrose 5%. 1000 milliLiter(s) (100 mL/Hr) IV Continuous <Continuous>  dextrose 5%. 1000 milliLiter(s) (50 mL/Hr) IV Continuous <Continuous>  dextrose 50% Injectable 25 Gram(s) IV Push once  dextrose 50% Injectable 12.5 Gram(s) IV Push once  dextrose 50% Injectable 25 Gram(s) IV Push once  enalapril 10 milliGRAM(s) Oral daily  finasteride 5 milliGRAM(s) Oral daily  glucagon  Injectable 1 milliGRAM(s) IntraMuscular once  insulin glargine Injectable (LANTUS) 19 Unit(s) SubCutaneous <User Schedule>  insulin lispro (ADMELOG) corrective regimen sliding scale   SubCutaneous three times a day before meals  insulin lispro (ADMELOG) corrective regimen sliding scale   SubCutaneous at bedtime  insulin lispro Injectable (ADMELOG) 6 Unit(s) SubCutaneous three times a day before meals  tamsulosin 0.4 milliGRAM(s) Oral at bedtime    MEDICATIONS  (PRN):  acetaminophen     Tablet .. 650 milliGRAM(s) Oral every 6 hours PRN Temp greater or equal to 38C (100.4F), Mild Pain (1 - 3)  dextrose Oral Gel 15 Gram(s) Oral once PRN Blood Glucose LESS THAN 70 milliGRAM(s)/deciliter      LABS:      06-14 @ 06:18    WBC 10.97 / Hct 30.5  / SCr 0.91         Urinalysis with Rflx Culture (collected 06-13-25 @ 01:01)    Culture - Urine (collected 06-13-25 @ 01:01)  Source: Clean Catch  Final Report (06-14-25 @ 16:26):    No growth    Culture - Blood (collected 06-10-25 @ 09:47)  Source: Blood Blood-Peripheral  Gram Stain (06-12-25 @ 04:49):    Growth in anaerobic bottle: Gram Positive Cocci in Clusters  Final Report (06-13-25 @ 16:27):    Growth in anaerobic bottle: Staphylococcus aureus    Direct identification is available within approximately 3-5    hours either by Blood Panel Multiplexed PCR or Direct    MALDI-TOF. Details: [email or web address]  Organism: Blood Culture PCR  Staphylococcus aureus (06-13-25 @ 16:27)  Organism: Blood Culture PCR (06-13-25 @ 16:27)      Method Type: PCR      -  Methicillin SENSITIVE Staphylococcus aureus (MSSA): Detec Any isolate of Staphylococcus aureus from a blood culture is NOT considered a contaminant.  Organism: Staphylococcus aureus (06-13-25 @ 16:27)      -  Clindamycin: S <=0.25      -  Oxacillin: S <=0.25 Oxacillin predicts susceptibility for dicloxacillin, methicillin, and nafcillin      -  Gentamicin: S <=4 Should not be used as monotherapy      -  Vancomycin: S 1      -  Tetracycline: S <=4      Method Type: WILL      -  Rifampin: S <=1 Should not be used as monotherapy      -  Erythromycin: S <=0.25      -  Trimethoprim/Sulfamethoxazole: S <=0.5/9.5    Culture - Blood (collected 06-10-25 @ 09:40)  Source: Blood Blood-Peripheral  Preliminary Report (06-14-25 @ 13:01):    No growth at 4 days    Culture - Urine (collected 06-09-25 @ 03:27)  Source: Clean Catch Clean Catch (Midstream)  Final Report (06-12-25 @ 09:52):    >100,000 CFU/ml Staphylococcus aureus  Organism: Staphylococcus aureus (06-12-25 @ 09:52)  Organism: Staphylococcus aureus (06-12-25 @ 09:52)      -  Nitrofurantoin: S <=32      -  Oxacillin: S <=0.25 Oxacillin predicts susceptibility for dicloxacillin, methicillin, and nafcillin      -  Gentamicin: S <=4 Should not be used as monotherapy      -  Vancomycin: S 0.5      -  Tetracycline: S <=4      Method Type: WILL      -  Rifampin: S <=1 Should not be used as monotherapy      -  Trimethoprim/Sulfamethoxazole: S <=0.5/9.5          RADIOLOGY:

## 2025-06-16 LAB
ALBUMIN SERPL ELPH-MCNC: 3.1 G/DL — LOW (ref 3.3–5)
ALP SERPL-CCNC: 57 U/L — SIGNIFICANT CHANGE UP (ref 40–120)
ALT FLD-CCNC: 8 U/L — SIGNIFICANT CHANGE UP (ref 4–41)
ANION GAP SERPL CALC-SCNC: 13 MMOL/L — SIGNIFICANT CHANGE UP (ref 7–14)
AST SERPL-CCNC: 17 U/L — SIGNIFICANT CHANGE UP (ref 4–40)
BASOPHILS # BLD AUTO: 0.09 K/UL — SIGNIFICANT CHANGE UP (ref 0–0.2)
BASOPHILS NFR BLD AUTO: 0.7 % — SIGNIFICANT CHANGE UP (ref 0–2)
BILIRUB SERPL-MCNC: 1.8 MG/DL — HIGH (ref 0.2–1.2)
BUN SERPL-MCNC: 25 MG/DL — HIGH (ref 7–23)
CALCIUM SERPL-MCNC: 9.1 MG/DL — SIGNIFICANT CHANGE UP (ref 8.4–10.5)
CHLORIDE SERPL-SCNC: 99 MMOL/L — SIGNIFICANT CHANGE UP (ref 98–107)
CO2 SERPL-SCNC: 23 MMOL/L — SIGNIFICANT CHANGE UP (ref 22–31)
CREAT SERPL-MCNC: 0.97 MG/DL — SIGNIFICANT CHANGE UP (ref 0.5–1.3)
EGFR: 92 ML/MIN/1.73M2 — SIGNIFICANT CHANGE UP
EGFR: 92 ML/MIN/1.73M2 — SIGNIFICANT CHANGE UP
EOSINOPHIL # BLD AUTO: 0.19 K/UL — SIGNIFICANT CHANGE UP (ref 0–0.5)
EOSINOPHIL NFR BLD AUTO: 1.4 % — SIGNIFICANT CHANGE UP (ref 0–6)
GLUCOSE BLDC GLUCOMTR-MCNC: 122 MG/DL — HIGH (ref 70–99)
GLUCOSE BLDC GLUCOMTR-MCNC: 172 MG/DL — HIGH (ref 70–99)
GLUCOSE BLDC GLUCOMTR-MCNC: 203 MG/DL — HIGH (ref 70–99)
GLUCOSE BLDC GLUCOMTR-MCNC: 203 MG/DL — HIGH (ref 70–99)
GLUCOSE SERPL-MCNC: 123 MG/DL — HIGH (ref 70–99)
HCT VFR BLD CALC: 28.1 % — LOW (ref 39–50)
HGB BLD-MCNC: 9.1 G/DL — LOW (ref 13–17)
IANC: 8.13 K/UL — HIGH (ref 1.8–7.4)
IMM GRANULOCYTES NFR BLD AUTO: 1.1 % — HIGH (ref 0–0.9)
LYMPHOCYTES # BLD AUTO: 29.2 % — SIGNIFICANT CHANGE UP (ref 13–44)
LYMPHOCYTES # BLD AUTO: 3.89 K/UL — HIGH (ref 1–3.3)
MCHC RBC-ENTMCNC: 28.3 PG — SIGNIFICANT CHANGE UP (ref 27–34)
MCHC RBC-ENTMCNC: 32.4 G/DL — SIGNIFICANT CHANGE UP (ref 32–36)
MCV RBC AUTO: 87.5 FL — SIGNIFICANT CHANGE UP (ref 80–100)
MONOCYTES # BLD AUTO: 0.89 K/UL — SIGNIFICANT CHANGE UP (ref 0–0.9)
MONOCYTES NFR BLD AUTO: 6.7 % — SIGNIFICANT CHANGE UP (ref 2–14)
NEUTROPHILS # BLD AUTO: 8.13 K/UL — HIGH (ref 1.8–7.4)
NEUTROPHILS NFR BLD AUTO: 60.9 % — SIGNIFICANT CHANGE UP (ref 43–77)
NRBC # BLD AUTO: 0 K/UL — SIGNIFICANT CHANGE UP (ref 0–0)
NRBC # FLD: 0 K/UL — SIGNIFICANT CHANGE UP (ref 0–0)
NRBC BLD AUTO-RTO: 0 /100 WBCS — SIGNIFICANT CHANGE UP (ref 0–0)
PLATELET # BLD AUTO: 463 K/UL — HIGH (ref 150–400)
POTASSIUM SERPL-MCNC: 4.5 MMOL/L — SIGNIFICANT CHANGE UP (ref 3.5–5.3)
POTASSIUM SERPL-SCNC: 4.5 MMOL/L — SIGNIFICANT CHANGE UP (ref 3.5–5.3)
PROT SERPL-MCNC: 6.9 G/DL — SIGNIFICANT CHANGE UP (ref 6–8.3)
RBC # BLD: 3.21 M/UL — LOW (ref 4.2–5.8)
RBC # FLD: 12.8 % — SIGNIFICANT CHANGE UP (ref 10.3–14.5)
SODIUM SERPL-SCNC: 135 MMOL/L — SIGNIFICANT CHANGE UP (ref 135–145)
WBC # BLD: 13.33 K/UL — HIGH (ref 3.8–10.5)
WBC # FLD AUTO: 13.33 K/UL — HIGH (ref 3.8–10.5)

## 2025-06-16 PROCEDURE — 99232 SBSQ HOSP IP/OBS MODERATE 35: CPT

## 2025-06-16 PROCEDURE — 99233 SBSQ HOSP IP/OBS HIGH 50: CPT

## 2025-06-16 RX ORDER — INSULIN LISPRO 100 U/ML
7 INJECTION, SOLUTION INTRAVENOUS; SUBCUTANEOUS
Refills: 0 | Status: DISCONTINUED | OUTPATIENT
Start: 2025-06-16 | End: 2025-06-18

## 2025-06-16 RX ORDER — INSULIN GLARGINE-YFGN 100 [IU]/ML
19 INJECTION, SOLUTION SUBCUTANEOUS
Refills: 0 | Status: DISCONTINUED | OUTPATIENT
Start: 2025-06-16 | End: 2025-06-17

## 2025-06-16 RX ADMIN — INSULIN LISPRO 7 UNIT(S): 100 INJECTION, SOLUTION INTRAVENOUS; SUBCUTANEOUS at 18:17

## 2025-06-16 RX ADMIN — Medication 100 MILLIGRAM(S): at 14:24

## 2025-06-16 RX ADMIN — Medication 100 MILLIGRAM(S): at 21:34

## 2025-06-16 RX ADMIN — Medication 1 APPLICATION(S): at 12:17

## 2025-06-16 RX ADMIN — Medication 100 MILLIGRAM(S): at 05:37

## 2025-06-16 RX ADMIN — FINASTERIDE 5 MILLIGRAM(S): 1 TABLET, FILM COATED ORAL at 12:17

## 2025-06-16 RX ADMIN — INSULIN LISPRO 6 UNIT(S): 100 INJECTION, SOLUTION INTRAVENOUS; SUBCUTANEOUS at 12:52

## 2025-06-16 RX ADMIN — INSULIN LISPRO 6 UNIT(S): 100 INJECTION, SOLUTION INTRAVENOUS; SUBCUTANEOUS at 09:03

## 2025-06-16 RX ADMIN — INSULIN LISPRO 1: 100 INJECTION, SOLUTION INTRAVENOUS; SUBCUTANEOUS at 18:16

## 2025-06-16 RX ADMIN — TAMSULOSIN HYDROCHLORIDE 0.4 MILLIGRAM(S): 0.4 CAPSULE ORAL at 21:35

## 2025-06-16 RX ADMIN — INSULIN GLARGINE-YFGN 19 UNIT(S): 100 INJECTION, SOLUTION SUBCUTANEOUS at 18:17

## 2025-06-16 RX ADMIN — INSULIN LISPRO 2: 100 INJECTION, SOLUTION INTRAVENOUS; SUBCUTANEOUS at 12:52

## 2025-06-16 RX ADMIN — Medication 10 MILLIGRAM(S): at 05:37

## 2025-06-16 NOTE — PROGRESS NOTE ADULT - ASSESSMENT
54 y/o M w/ PMHx of DM2, HTN,  admitted to [facility] on 6/9 for difficulty urinating, w/ some dysuria, hematuria  afebrile, noted to have WBC ot 17.6  positive U/A. UCx and 1/4 BCx with MSSA  Per patient has had dysuria since admission, then had darnell placed during admission  CT urinary bladder thickening/cystitis; lesion of prostate, abscesses  With no other source of MSSA, could this be a rare MSSA UTI/prostate abscess? (still very unusual)  TTE reassuring  Overall, MSSA Bacteremia, urinary retention  - Cefazolin 2g q 8, plan for 4 weeks from negative BCX  - Okay to place midline/PICC when most recent BCXs negative x 48 hours and no new signs infection  - Check CBC, BUN, Cr, LFTs weekly, send to [email or web address]  - Trend WBC to normal  - Defer any role for drainage to Urology  - Hold on NASIR, suspecting  source    Jonathan Farfan MD  Contact on TEAMS messaging from 9am - 5pm  From 5pm-9am, on weekends, or if no response call [phone]    Antibiotic decision making based on local antibiogram and available recent culture results

## 2025-06-16 NOTE — PROGRESS NOTE ADULT - SUBJECTIVE AND OBJECTIVE BOX
[facility] Division of Hospital Medicine  Martha Ortiz DO  Available via MS Teams  Pager: [phone]    Patient is a 55y old  Male who presents with a chief complaint of Urinary retention, Sepsis due to UTI, Uncontrolled DM2 (16 Jun 2025 09:17)      SUBJECTIVE / OVERNIGHT EVENTS:    Pt seen and examined this morning. Pt resting comfortably in bed denies any acute issues except for urinary retention. He would like to remove the darnell.     ADDITIONAL REVIEW OF SYSTEMS:  No Fever, chills, chest pain, shortness of breath.     MEDICATIONS  (STANDING):  ceFAZolin   IVPB 2000 milliGRAM(s) IV Intermittent every 8 hours  chlorhexidine 2% Cloths 1 Application(s) Topical daily  dextrose 5%. 1000 milliLiter(s) (100 mL/Hr) IV Continuous <Continuous>  dextrose 5%. 1000 milliLiter(s) (50 mL/Hr) IV Continuous <Continuous>  dextrose 50% Injectable 25 Gram(s) IV Push once  dextrose 50% Injectable 12.5 Gram(s) IV Push once  dextrose 50% Injectable 25 Gram(s) IV Push once  enalapril 10 milliGRAM(s) Oral daily  finasteride 5 milliGRAM(s) Oral daily  glucagon  Injectable 1 milliGRAM(s) IntraMuscular once  insulin glargine Injectable (LANTUS) 19 Unit(s) SubCutaneous <User Schedule>  insulin lispro (ADMELOG) corrective regimen sliding scale   SubCutaneous three times a day before meals  insulin lispro (ADMELOG) corrective regimen sliding scale   SubCutaneous at bedtime  insulin lispro Injectable (ADMELOG) 6 Unit(s) SubCutaneous three times a day before meals  tamsulosin 0.4 milliGRAM(s) Oral at bedtime    MEDICATIONS  (PRN):  acetaminophen     Tablet .. 650 milliGRAM(s) Oral every 6 hours PRN Temp greater or equal to 38C (100.4F), Mild Pain (1 - 3)  dextrose Oral Gel 15 Gram(s) Oral once PRN Blood Glucose LESS THAN 70 milliGRAM(s)/deciliter      I&O's Summary    15 Noble 2025 07:01  -  16 Jun 2025 07:00  --------------------------------------------------------  IN: 0 mL / OUT: 3200 mL / NET: -3200 mL        PHYSICAL EXAM:  Vital Signs Last 24 Hrs  T(C): 36.5 (16 Jun 2025 05:18), Max: 36.8 (15 Noble 2025 21:54)  T(F): 97.7 (16 Jun 2025 05:18), Max: 98.2 (15 Noble 2025 21:54)  HR: 81 (16 Jun 2025 05:18) (81 - 98)  BP: 115/71 (16 Jun 2025 05:18) (115/71 - 127/84)  BP(mean): --  RR: 18 (16 Jun 2025 05:18) (18 - 18)  SpO2: 100% (16 Jun 2025 05:18) (100% - 100%)    Parameters below as of 16 Jun 2025 05:18  Patient On (Oxygen Delivery Method): room air      CONSTITUTIONAL: NAD  EYES: PERRLA  ENMT: Moist oral mucosa  RESPIRATORY: Normal respiratory effort; lungs are clear to auscultation bilaterally  CARDIOVASCULAR: Regular rate and rhythm, normal S1 and S2, no murmur/rub/gallop  ABDOMEN: Nontender to palpation, normoactive bowel sounds  PSYCH: A+O to person, place, and time  NEUROLOGY: CN 2-12 are intact and symmetric; no gross sensory deficits     LABS:                        9.1    13.33 )-----------( 463      ( 16 Jun 2025 06:00 )             28.1     06-16    135  |  99  |  25[H]  ----------------------------<  123[H]  4.5   |  23  |  0.97    Ca    9.1      16 Jun 2025 06:00    TPro  6.9  /  Alb  3.1[L]  /  TBili  1.8[H]  /  DBili  x   /  AST  17  /  ALT  8   /  AlkPhos  57  06-16          Urinalysis Basic - ( 16 Jun 2025 06:00 )    Color: x / Appearance: x / SG: x / pH: x  Gluc: 123 mg/dL / Ketone: x  / Bili: x / Urobili: x   Blood: x / Protein: x / Nitrite: x   Leuk Esterase: x / RBC: x / WBC x   Sq Epi: x / Non Sq Epi: x / Bacteria: x        Culture - Blood (collected 14 Jun 2025 07:29)  Source: Blood Blood-Peripheral  Preliminary Report (16 Jun 2025 11:01):    No growth at 48 Hours    Culture - Blood (collected 14 Jun 2025 07:09)  Source: Blood Blood  Preliminary Report (16 Jun 2025 11:01):    No growth at 48 Hours

## 2025-06-16 NOTE — PROGRESS NOTE ADULT - ASSESSMENT
54yo M presenting with primary urinary complaints found to have prostatic abscess on imaging. CT scan shows two large 3cm abscesses in the apex of the prostate and a smaller abscess near the base.     Given that patient is bacteremic and size of abscesses on imaging recommend drainage with IR.   Would avoid TUR prostate given apical location near the sphincter and higher risk for urinary incontinence. Also high rate of ejaculatory dysfunction.    Recommendations:  - Continue abx  - Repeat blood cultures  - Per IR, fluid collections too small for drain placement and recommended conservative management at this time, can re-image/re-consult if pt does not improve or clinically worsens  - Continue flomax and finasteride  - Can remove darnell catheter today and give patient TOV, if patient fails TOV, catheter should be replaced and patient should be discharged with darnell catheter.  - F/u ID regarding final abx plan, once patient has finished final antibiotic course should follow with up urology to ensure that prostatic abscess have resolved with reimaging  - IR deferring drainage of prostatic abscess at this time given size  - Discussed with Dr. Olmos    No further recommendations from urologic perspective, urology to sign off please reconsult as needed. For reconsults page [phone].    [facility]  [address] [place] [address], [id number]  [place] [address]  [phone] 54yo M presenting with primary urinary complaints found to have prostatic abscess on imaging. CT scan shows two large 3cm abscesses in the apex of the prostate and a smaller abscess near the base.     Given that patient is bacteremic and size of abscesses on imaging recommend drainage with IR.   Would avoid TUR prostate given apical location near the sphincter and higher risk for urinary incontinence. Also high rate of ejaculatory dysfunction.    Recommendations:  - Continue abx  - Repeat blood cultures  - WBC uptrending today, if continues to uptrend tomorrow would recommend reconsulting IR for drainage  - Per IR, fluid collections too small for drain placement and recommended conservative management at this time, can re-image/re-consult if pt does not improve or clinically worsens  - Continue flomax and finasteride  - Can remove darnell catheter today and give patient TOV, if patient fails TOV, catheter should be replaced and patient should be discharged with darnell catheter.  - F/u ID regarding final abx plan, once patient has finished final antibiotic course should follow with up urology to ensure that prostatic abscess have resolved with reimaging  - IR deferring drainage of prostatic abscess at this time given size  - Discussed with Dr. Olmos      [facility]  [address] [place] [address], [address]  [place] [address] [phone]

## 2025-06-16 NOTE — CHART NOTE - NSCHARTNOTEFT_GEN_A_CORE
55M with history of DM2 and HTN (not on medications currently for either) who presents to the hospital with complaints of difficulty urinating since last night. Said that for the past 1 week he has been having urinary frequency, urgency, and dysuria. Had some hematuria initially with these symptoms but that resolved soon after it had started. Was also having chills and diaphoresis but no fevers and therefore did not seek medical attention. Said that since last night he would feel the urge to urinate but would only dribble a little and have a sensation of incomplete voiding. This continued for a while and he started to have pelvic pain and discomfort and therefore came to the hospital. Here had a darnell placed with almost immediate relief in his symptoms. Currently states he has some mild abdominal pain and had 1 episode of NBNB emesis in the ED. Also reports having chronic peripheral neuropathy x years of his feet. Otherwise denies any acute complaints. Endocrine consulted for uncontrolled diabetes, A1c 13.    Endocrine history:   DM dx: diagnosed with type 2 diabetes 5 to 6 years ago, thinks his A1c back then was 10  Medications: metformin 500 mg twice daily – stopped 2 to 3 years ago. Patient states that metformin was not working and was not doing anything for his diabetes since he was still hyperglycemic at home.  Fingersticks/CGM: Does not use CGM.  When he was checking his fingerstick, it was usually 200-300s.  He stopped checking his fingerstick a long time ago.   Diet: Patient stated that he tries to control his diabetes with diet.  He eats a lot of vegetables and makes his own juice–papaya.  He eats rice, bread.  Endocrinologist: None  Ophthalmologist evaluation: Last eye exam was 3 to 4 years ago.  Patient currently unemployed.  He has no medical insurance.  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  Patient not in DKA.     Inpatient plan   - FS goal 100-180 mg/dl   - FS above goal   - Continue Lantus 19 units sq changed to 7pm today ---> move 2 hours daily until reaches bedtime   - Increase Admelog to 7 units TID AC.  Hold Admelog if not eating/NPO.  - continue low Admelog correctional scale TID AC  - continue separate low Admelog correctional scale at HS  - FS TID AC & HS ---> q6 if NPO   - hypoglycemia protocol PRN   - consistent carbohydrate diet  - RD consult  - provider to RN - insulin pen teaching    Discharge plan  - Patient uninsured.  - Discussed with patient regarding basal/bolus insulin at home.  Patient tentatively agreed to using insulin, stating "I will try my best".  - Discharge on basal/bolus doses TBD + metformin 500 mg PO BID for 7 days then increase to 1000 mg PO BID.   - Please send prescription for Basaglar pen + Humalog pen  Coupon code:  LV: [id number]  Group: [id number]  Vein: [id number]  PCN: SSN  - please send prescription for glucometer and supplies - test strip, lancets, alcohol pads and insulin pen needles.   - Please send prescription for glucose tablets 4G (take 4 tablets) or 15G tablets for blood sugar less than 70 mG/dL, repeat fingerstick in 15 minutes. Call your provider for dose adjustment if needed.   - Follow up at [facility]: [address] [place] [address]; Ph # [phone] : will clarify with pt if ok to email for an appointment     HTN  - outpatient BP goal <130/80   - continue enalapril   - Please obtain urine micro albumin cr ratio as an outpt   - management per primary team     HLD   - LDL goal <70   - Check lipid panel if not done recently   - Consider add Lipitor 10mg p.o. qhs if no contraindications   - management per primary team       MEDICATIONS  (STANDING):  ceFAZolin   IVPB 2000 milliGRAM(s) IV Intermittent every 8 hours  chlorhexidine 2% Cloths 1 Application(s) Topical daily  dextrose 5%. 1000 milliLiter(s) (100 mL/Hr) IV Continuous <Continuous>  dextrose 5%. 1000 milliLiter(s) (50 mL/Hr) IV Continuous <Continuous>  dextrose 50% Injectable 25 Gram(s) IV Push once  dextrose 50% Injectable 12.5 Gram(s) IV Push once  dextrose 50% Injectable 25 Gram(s) IV Push once  enalapril 10 milliGRAM(s) Oral daily  finasteride 5 milliGRAM(s) Oral daily  glucagon  Injectable 1 milliGRAM(s) IntraMuscular once  insulin glargine Injectable (LANTUS) 19 Unit(s) SubCutaneous <User Schedule>  insulin lispro (ADMELOG) corrective regimen sliding scale   SubCutaneous three times a day before meals  insulin lispro (ADMELOG) corrective regimen sliding scale   SubCutaneous at bedtime  insulin lispro Injectable (ADMELOG) 6 Unit(s) SubCutaneous three times a day before meals  tamsulosin 0.4 milliGRAM(s) Oral at bedtime    MEDICATIONS  (PRN):  acetaminophen     Tablet .. 650 milliGRAM(s) Oral every 6 hours PRN Temp greater or equal to 38C (100.4F), Mild Pain (1 - 3)  dextrose Oral Gel 15 Gram(s) Oral once PRN Blood Glucose LESS THAN 70 milliGRAM(s)/deciliter      Allergies: No Known Allergies    PHYSICAL EXAM:  VITALS: T(C): 36.5 (06-16-25 @ 05:18)  T(F): 97.7 (06-16-25 @ 05:18), Max: 98.2 (06-15-25 @ 21:54)  HR: 81 (06-16-25 @ 05:18) (81 - 98)  BP: 115/71 (06-16-25 @ 05:18) (115/71 - 127/84)  RR:  (18 - 18)  SpO2:  (100% - 100%)    CAPILLARY BLOOD GLUCOSE  POCT Blood Glucose.: 203 mg/dL (16 Jun 2025 12:34)  POCT Blood Glucose.: 122 mg/dL (16 Jun 2025 08:55)  POCT Blood Glucose.: 182 mg/dL (15 Noble 2025 22:08)  POCT Blood Glucose.: 167 mg/dL (15 Noble 2025 17:41)    A1C with Estimated Average Glucose (06.09.25 @ 03:29)    A1C with Estimated Average Glucose Result: 13.0    Estimated Average Glucose: 326    06-16    135  |  99  |  25[H]  ----------------------------<  123[H]  4.5   |  23  |  0.97    eGFR: 92    Ca    9.1      06-16    TPro  6.9  /  Alb  3.1[L]  /  TBili  1.8[H]  /  DBili  x   /  AST  17  /  ALT  8   /  AlkPhos  57  06-16    Diet, Regular:   Consistent Carbohydrate {Evening Snack} (CSTCHOSN)  DASH/TLC {Sodium & Cholesterol Restricted} (DASH) (06-09-25 @ 07:12) [Active]      Claudette Toth  Nurse Practitioner  Division of Endocrinology & Diabetes  Available Microsoft Teams    If before 9AM or after 6PM, or on weekends/holidays, please call endocrine answering service for assistance ([phone]).For nonurgent matters email [email or web address] for assistance.

## 2025-06-16 NOTE — PROGRESS NOTE ADULT - SUBJECTIVE AND OBJECTIVE BOX
Subjective  Denies fevers, chills, nausea, vomiting, SOB, CP.  Tolerating diet.    Objective    Vital signs  T(F): , Max: 98.2 (06-15-25 @ 21:54)  HR: 81 (06-16-25 @ 05:18)  BP: 115/71 (06-16-25 @ 05:18)  SpO2: 100% (06-16-25 @ 05:18)  Wt(kg): --    Output     OUT:    Indwelling Catheter - Urethral (mL): 3200 mL  Total OUT: 3200 mL    Total NET: -3200 mL          Gen: NAD  Abd: soft, nontender, nondistended  : darnell secured in place, draining CYU    Labs      06-16 @ 06:00    WBC 13.33 / Hct 28.1  / SCr --       06-15 @ 09:50    WBC 11.74 / Hct 33.3  / SCr 0.93         Culture - Blood (collected 06-14-25 @ 07:29)  Source: Blood Blood-Peripheral  Preliminary Report (06-15-25 @ 11:01):    No growth at 24 hours    Culture - Blood (collected 06-14-25 @ 07:09)  Source: Blood Blood  Preliminary Report (06-15-25 @ 11:01):    No growth at 24 hours    Urinalysis with Rflx Culture (collected 06-13-25 @ 01:01)    Culture - Urine (collected 06-13-25 @ 01:01)  Source: Clean Catch  Final Report (06-14-25 @ 16:26):    No growth    Culture - Blood (collected 06-10-25 @ 09:47)  Source: Blood Blood-Peripheral  Gram Stain (06-12-25 @ 04:49):    Growth in anaerobic bottle: Gram Positive Cocci in Clusters  Final Report (06-13-25 @ 16:27):    Growth in anaerobic bottle: Staphylococcus aureus    Direct identification is available within approximately 3-5    hours either by Blood Panel Multiplexed PCR or Direct    MALDI-TOF. Details: [email or web address]  Organism: Blood Culture PCR  Staphylococcus aureus (06-13-25 @ 16:27)  Organism: Staphylococcus aureus (06-13-25 @ 16:27)      -  Clindamycin: S <=0.25      -  Oxacillin: S <=0.25 Oxacillin predicts susceptibility for dicloxacillin, methicillin, and nafcillin      -  Gentamicin: S <=4 Should not be used as monotherapy      -  Vancomycin: S 1      -  Tetracycline: S <=4      Method Type: WILL      -  Rifampin: S <=1 Should not be used as monotherapy      -  Erythromycin: S <=0.25      -  Trimethoprim/Sulfamethoxazole: S <=0.5/9.5  Organism: Blood Culture PCR (06-13-25 @ 16:27)      Method Type: PCR      -  Methicillin SENSITIVE Staphylococcus aureus (MSSA): Detec Any isolate of Staphylococcus aureus from a blood culture is NOT considered a contaminant.    Culture - Blood (collected 06-10-25 @ 09:40)  Source: Blood Blood-Peripheral  Final Report (06-15-25 @ 13:00):    No growth at 5 days        Urine Cx: ?  Blood Cx: ?    Imaging

## 2025-06-16 NOTE — PROGRESS NOTE ADULT - SUBJECTIVE AND OBJECTIVE BOX
CC: F/U for Bacteremia    Saw/spoke to patient. Unchanged. No new events.    Allergies  No Known Allergies    ANTIMICROBIALS:  ceFAZolin   IVPB 2000 every 8 hours    PE:    Vital Signs Last 24 Hrs  T(C): 36.5 (16 Jun 2025 05:18), Max: 36.8 (15 [place] 2025 21:54)  T(F): 97.7 (16 Jun 2025 05:18), Max: 98.2 (15 [place] 2025 21:54)  HR: 81 (16 Jun 2025 05:18) (81 - 98)  BP: 115/71 (16 Jun 2025 05:18) (115/71 - 127/84)  RR: 18 (16 Jun 2025 05:18) (18 - 18)  SpO2: 100% (16 Jun 2025 05:18) (100% - 100%)    Gen: AOx3, NAD, non-toxic  CV: Nontachycardic  Resp: Breathing comfortably, RA  Abd: Soft, nontender  IV/Skin: No thrombophlebitis    LABS:                        9.1    13.33 )-----------( 463      ( 16 Jun 2025 06:00 )             28.1     06-16    135  |  99  |  25[H]  ----------------------------<  123[H]  4.5   |  23  |  0.97    Ca    9.1      16 Jun 2025 06:00    TPro  6.9  /  Alb  3.1[L]  /  TBili  1.8[H]  /  DBili  x   /  AST  17  /  ALT  8   /  AlkPhos  57  06-16    Urinalysis Basic - ( 16 Jun 2025 06:00 )    Color: x / Appearance: x / SG: x / pH: x  Gluc: 123 mg/dL / Ketone: x  / Bili: x / Urobili: x   Blood: x / Protein: x / Nitrite: x   Leuk Esterase: x / RBC: x / WBC x   Sq Epi: x / Non Sq Epi: x / Bacteria: x    MICROBIOLOGY:    Blood Blood-Peripheral  06-14-25   No growth at 48 Hours  --  --    Blood Blood  06-14-25   No growth at 48 Hours  --  --    Clean Catch  06-13-25   No growth  --  --    Blood Blood-Peripheral  06-10-25   Growth in anaerobic bottle: Staphylococcus aureus  Direct identification is available within approximately 3-5  hours either by Blood Panel Multiplexed PCR or Direct  MALDI-TOF. Details: [email or web address]  --  Blood Culture PCR  Staphylococcus aureus    Blood Blood-Peripheral  06-10-25   No growth at 5 days  --  --    Clean Catch Clean Catch (Midstream)  06-09-25   >100,000 CFU/ml Staphylococcus aureus  --  Staphylococcus aureus    RADIOLOGY:    6/12    IMPRESSION:  *  Urinary bladder wall thickening and perivesical fat infiltration,   concerning for cystitis.  *  Several low-density peripherally enhancing lesions in the prostate and   at the junction of the prostate and right seminal vesicle, suspicious for   abscesses, the largest measuring 3.0 cm.  *  Cystic lesion in the right hemiscrotum measuring 2.6 cm. A scrotal   ultrasound can be performed for further evaluation.

## 2025-06-17 LAB
ANION GAP SERPL CALC-SCNC: 11 MMOL/L — SIGNIFICANT CHANGE UP (ref 7–14)
BUN SERPL-MCNC: 26 MG/DL — HIGH (ref 7–23)
CALCIUM SERPL-MCNC: 8.9 MG/DL — SIGNIFICANT CHANGE UP (ref 8.4–10.5)
CHLORIDE SERPL-SCNC: 102 MMOL/L — SIGNIFICANT CHANGE UP (ref 98–107)
CO2 SERPL-SCNC: 23 MMOL/L — SIGNIFICANT CHANGE UP (ref 22–31)
CREAT SERPL-MCNC: 0.87 MG/DL — SIGNIFICANT CHANGE UP (ref 0.5–1.3)
EGFR: 101 ML/MIN/1.73M2 — SIGNIFICANT CHANGE UP
EGFR: 101 ML/MIN/1.73M2 — SIGNIFICANT CHANGE UP
GLUCOSE BLDC GLUCOMTR-MCNC: 107 MG/DL — HIGH (ref 70–99)
GLUCOSE BLDC GLUCOMTR-MCNC: 153 MG/DL — HIGH (ref 70–99)
GLUCOSE BLDC GLUCOMTR-MCNC: 181 MG/DL — HIGH (ref 70–99)
GLUCOSE BLDC GLUCOMTR-MCNC: 183 MG/DL — HIGH (ref 70–99)
GLUCOSE BLDC GLUCOMTR-MCNC: 207 MG/DL — HIGH (ref 70–99)
GLUCOSE SERPL-MCNC: 140 MG/DL — HIGH (ref 70–99)
HCT VFR BLD CALC: 27.9 % — LOW (ref 39–50)
HGB BLD-MCNC: 9 G/DL — LOW (ref 13–17)
MAGNESIUM SERPL-MCNC: 2.2 MG/DL — SIGNIFICANT CHANGE UP (ref 1.6–2.6)
MCHC RBC-ENTMCNC: 28.7 PG — SIGNIFICANT CHANGE UP (ref 27–34)
MCHC RBC-ENTMCNC: 32.3 G/DL — SIGNIFICANT CHANGE UP (ref 32–36)
MCV RBC AUTO: 88.9 FL — SIGNIFICANT CHANGE UP (ref 80–100)
NRBC # BLD AUTO: 0 K/UL — SIGNIFICANT CHANGE UP (ref 0–0)
NRBC # FLD: 0 K/UL — SIGNIFICANT CHANGE UP (ref 0–0)
NRBC BLD AUTO-RTO: 0 /100 WBCS — SIGNIFICANT CHANGE UP (ref 0–0)
PHOSPHATE SERPL-MCNC: 3.2 MG/DL — SIGNIFICANT CHANGE UP (ref 2.5–4.5)
PLATELET # BLD AUTO: 489 K/UL — HIGH (ref 150–400)
POTASSIUM SERPL-MCNC: 4 MMOL/L — SIGNIFICANT CHANGE UP (ref 3.5–5.3)
POTASSIUM SERPL-SCNC: 4 MMOL/L — SIGNIFICANT CHANGE UP (ref 3.5–5.3)
RBC # BLD: 3.14 M/UL — LOW (ref 4.2–5.8)
RBC # FLD: 13.2 % — SIGNIFICANT CHANGE UP (ref 10.3–14.5)
SODIUM SERPL-SCNC: 136 MMOL/L — SIGNIFICANT CHANGE UP (ref 135–145)
WBC # BLD: 12.4 K/UL — HIGH (ref 3.8–10.5)
WBC # FLD AUTO: 12.4 K/UL — HIGH (ref 3.8–10.5)

## 2025-06-17 PROCEDURE — 99232 SBSQ HOSP IP/OBS MODERATE 35: CPT

## 2025-06-17 PROCEDURE — 99233 SBSQ HOSP IP/OBS HIGH 50: CPT

## 2025-06-17 RX ORDER — CEFAZOLIN SODIUM IN 0.9 % NACL 3 G/100 ML
2 INTRAVENOUS SOLUTION, PIGGYBACK (ML) INTRAVENOUS
Qty: 144 | Refills: 0
Start: 2025-06-17 | End: 2025-07-10

## 2025-06-17 RX ORDER — INSULIN GLARGINE-YFGN 100 [IU]/ML
19 INJECTION, SOLUTION SUBCUTANEOUS
Refills: 0 | Status: DISCONTINUED | OUTPATIENT
Start: 2025-06-17 | End: 2025-06-18

## 2025-06-17 RX ADMIN — INSULIN GLARGINE-YFGN 19 UNIT(S): 100 INJECTION, SOLUTION SUBCUTANEOUS at 19:57

## 2025-06-17 RX ADMIN — INSULIN LISPRO 1: 100 INJECTION, SOLUTION INTRAVENOUS; SUBCUTANEOUS at 17:57

## 2025-06-17 RX ADMIN — INSULIN LISPRO 7 UNIT(S): 100 INJECTION, SOLUTION INTRAVENOUS; SUBCUTANEOUS at 12:40

## 2025-06-17 RX ADMIN — INSULIN LISPRO 7 UNIT(S): 100 INJECTION, SOLUTION INTRAVENOUS; SUBCUTANEOUS at 17:58

## 2025-06-17 RX ADMIN — FINASTERIDE 5 MILLIGRAM(S): 1 TABLET, FILM COATED ORAL at 12:02

## 2025-06-17 RX ADMIN — Medication 100 MILLIGRAM(S): at 22:22

## 2025-06-17 RX ADMIN — Medication 1 APPLICATION(S): at 12:02

## 2025-06-17 RX ADMIN — Medication 100 MILLIGRAM(S): at 05:59

## 2025-06-17 RX ADMIN — INSULIN LISPRO 7 UNIT(S): 100 INJECTION, SOLUTION INTRAVENOUS; SUBCUTANEOUS at 08:50

## 2025-06-17 RX ADMIN — INSULIN LISPRO 2: 100 INJECTION, SOLUTION INTRAVENOUS; SUBCUTANEOUS at 12:40

## 2025-06-17 RX ADMIN — Medication 100 MILLIGRAM(S): at 13:19

## 2025-06-17 RX ADMIN — Medication 10 MILLIGRAM(S): at 06:00

## 2025-06-17 RX ADMIN — TAMSULOSIN HYDROCHLORIDE 0.4 MILLIGRAM(S): 0.4 CAPSULE ORAL at 22:22

## 2025-06-17 RX ADMIN — INSULIN LISPRO 1: 100 INJECTION, SOLUTION INTRAVENOUS; SUBCUTANEOUS at 08:50

## 2025-06-17 NOTE — PROGRESS NOTE ADULT - SUBJECTIVE AND OBJECTIVE BOX
Subjective  Denies fevers, chills, nausea, vomiting, CP. Patient had catheter removed overnight and voided 300cc with PVR of 340cc but says he is having less dysuria and feels like he can empty better.     Objective    Vital signs  T(F): , Max: 99 (06-16-25 @ 17:47)  HR: 85 (06-17-25 @ 05:54)  BP: 135/90 (06-17-25 @ 05:54)  SpO2: 100% (06-17-25 @ 05:54)  Wt(kg): --    Output     OUT:    Indwelling Catheter - Urethral (mL): 3200 mL  Total OUT: 3200 mL    Total NET: -3200 mL      OUT:    Indwelling Catheter - Urethral (mL): 2900 mL    Voided (mL): 300 mL  Total OUT: 3200 mL    Total NET: -3200 mL          Gen: NAD  Abd: SNN    Labs      06-16 @ 06:00    WBC 13.33 / Hct 28.1  / SCr 0.97     06-15 @ 09:50    WBC 11.74 / Hct 33.3  / SCr 0.93       Urine Cx: ?  Blood Cx: ?    Imaging

## 2025-06-17 NOTE — PROGRESS NOTE ADULT - ASSESSMENT
54 y/o M w/ PMHx of DM2, HTN,  admitted to [facility] on 6/9 for difficulty urinating, w/ some dysuria, hematuria  afebrile, noted to have WBC ot 17.6  positive U/A. UCx and 1/4 BCx with MSSA  Per patient has had dysuria since admission, then had darnell placed during admission  CT urinary bladder thickening/cystitis; lesion of prostate, abscesses  USG possible epididymitis/oorchitis  With no other source of MSSA, could this be a rare MSSA UTI/prostate abscess? (still very unusual)  TTE reassuring  Overall, MSSA Bacteremia, urinary retention  - Cefazolin 2g q 8, plan for 4 weeks from negative BCX  - Okay to place midline/PICC when most recent BCXs negative x 48 hours and no new signs infection  - Check CBC, BUN, Cr, LFTs weekly, send to [email or web address]  - Trend WBC to normal  - Defer any role for drainage to Urology  - Hold on NASIR, suspecting  source    Jonathan Farfan MD  Contact on TEAMS messaging from 9am - 5pm  From 5pm-9am, on weekends, or if no response call [phone]    Antibiotic decision making based on local antibiogram and available recent culture results

## 2025-06-17 NOTE — PROGRESS NOTE ADULT - SUBJECTIVE AND OBJECTIVE BOX
Chief Complaint: Type 2 DM     History: Pt seen at bedside. Pt tolerating oral diet. Pt denies nausea and vomiting/any signs of hypoglycemia. Pt reports an adequate appetite.     MEDICATIONS  (STANDING):  ceFAZolin   IVPB 2000 milliGRAM(s) IV Intermittent every 8 hours  chlorhexidine 2% Cloths 1 Application(s) Topical daily  dextrose 5%. 1000 milliLiter(s) (100 mL/Hr) IV Continuous <Continuous>  dextrose 5%. 1000 milliLiter(s) (50 mL/Hr) IV Continuous <Continuous>  dextrose 50% Injectable 25 Gram(s) IV Push once  dextrose 50% Injectable 12.5 Gram(s) IV Push once  dextrose 50% Injectable 25 Gram(s) IV Push once  enalapril 10 milliGRAM(s) Oral daily  finasteride 5 milliGRAM(s) Oral daily  glucagon  Injectable 1 milliGRAM(s) IntraMuscular once  insulin glargine Injectable (LANTUS) 19 Unit(s) SubCutaneous <User Schedule>  insulin lispro (ADMELOG) corrective regimen sliding scale   SubCutaneous three times a day before meals  insulin lispro (ADMELOG) corrective regimen sliding scale   SubCutaneous at bedtime  insulin lispro Injectable (ADMELOG) 7 Unit(s) SubCutaneous three times a day before meals  tamsulosin 0.4 milliGRAM(s) Oral at bedtime    MEDICATIONS  (PRN):  acetaminophen     Tablet .. 650 milliGRAM(s) Oral every 6 hours PRN Temp greater or equal to 38C (100.4F), Mild Pain (1 - 3)  dextrose Oral Gel 15 Gram(s) Oral once PRN Blood Glucose LESS THAN 70 milliGRAM(s)/deciliter      Allergies: No Known Allergies    Review of Systems:  Respiratory: No SOB, no cough  GI: No nausea, vomiting, abdominal pain  Endocrine: no polyuria, polydipsia    PHYSICAL EXAM:  VITALS: T(C): 36.7 (06-17-25 @ 05:54)  T(F): 98.1 (06-17-25 @ 05:54), Max: 99 (06-16-25 @ 17:47)  HR: 85 (06-17-25 @ 05:54) (82 - 93)  BP: 135/90 (06-17-25 @ 05:54) (124/74 - 136/81)  RR:  (17 - 18)  SpO2:  (99% - 100%)  Wt(kg): --  GENERAL: NAD, well-groomed, well-developed  RESPIRATORY: No labored breathing   GI: Soft, nontender, non distended  PSYCH: Alert and oriented x 3, normal affect, normal mood      CAPILLARY BLOOD GLUCOSE  POCT Blood Glucose.: 207 mg/dL (17 Jun 2025 12:29)  POCT Blood Glucose.: 153 mg/dL (17 Jun 2025 08:46)  POCT Blood Glucose.: 203 mg/dL (16 Jun 2025 21:19)  POCT Blood Glucose.: 172 mg/dL (16 Jun 2025 17:37)    A1C with Estimated Average Glucose (06.09.25 @ 03:29)    A1C with Estimated Average Glucose Result: 13.0   Estimated Average Glucose: 326      06-17    136  |  102  |  26[H]  ----------------------------<  140[H]  4.0   |  23  |  0.87    eGFR: 101    Ca    8.9      06-17  Mg     2.20     06-17  Phos  3.2     06-17    TPro  6.9  /  Alb  3.1[L]  /  TBili  1.8[H]  /  DBili  x   /  AST  17  /  ALT  8   /  AlkPhos  57  06-16    Diet, Regular:   Consistent Carbohydrate Evening Snack (CSTCHOSN)  DASH/TLC Sodium & Cholesterol Restricted (DASH) (06-09-25 @ 07:12) [Active]

## 2025-06-17 NOTE — PROGRESS NOTE ADULT - SUBJECTIVE AND OBJECTIVE BOX
[facility] Division of Hospital Medicine  Martha Ortiz DO  Available via MS Teams  Pager: [phone]    Patient is a 55y old  Male who presents with a chief complaint of Urinary retention, Sepsis due to UTI, Uncontrolled DM2 (17 Jun 2025 06:42)      SUBJECTIVE / OVERNIGHT EVENTS:    Pt seen and examined this morning. Pt states he feels well today. He had darnell removed overnight and is currently undergoing trial of void. He states he has been able to urinate without any issues.     ADDITIONAL REVIEW OF SYSTEMS:  No Fever, chills, chest pain, shortness of breath.     MEDICATIONS  (STANDING):  ceFAZolin   IVPB 2000 milliGRAM(s) IV Intermittent every 8 hours  chlorhexidine 2% Cloths 1 Application(s) Topical daily  dextrose 5%. 1000 milliLiter(s) (100 mL/Hr) IV Continuous <Continuous>  dextrose 5%. 1000 milliLiter(s) (50 mL/Hr) IV Continuous <Continuous>  dextrose 50% Injectable 25 Gram(s) IV Push once  dextrose 50% Injectable 12.5 Gram(s) IV Push once  dextrose 50% Injectable 25 Gram(s) IV Push once  enalapril 10 milliGRAM(s) Oral daily  finasteride 5 milliGRAM(s) Oral daily  glucagon  Injectable 1 milliGRAM(s) IntraMuscular once  insulin glargine Injectable (LANTUS) 19 Unit(s) SubCutaneous <User Schedule>  insulin lispro (ADMELOG) corrective regimen sliding scale   SubCutaneous three times a day before meals  insulin lispro (ADMELOG) corrective regimen sliding scale   SubCutaneous at bedtime  insulin lispro Injectable (ADMELOG) 7 Unit(s) SubCutaneous three times a day before meals  tamsulosin 0.4 milliGRAM(s) Oral at bedtime    MEDICATIONS  (PRN):  acetaminophen     Tablet .. 650 milliGRAM(s) Oral every 6 hours PRN Temp greater or equal to 38C (100.4F), Mild Pain (1 - 3)  dextrose Oral Gel 15 Gram(s) Oral once PRN Blood Glucose LESS THAN 70 milliGRAM(s)/deciliter      I&O's Summary    16 Jun 2025 07:01  -  17 Jun 2025 07:00  --------------------------------------------------------  IN: 500 mL / OUT: 3200 mL / NET: -2700 mL        PHYSICAL EXAM:  Vital Signs Last 24 Hrs  T(C): 36.7 (17 Jun 2025 05:54), Max: 37.2 (16 Jun 2025 17:47)  T(F): 98.1 (17 Jun 2025 05:54), Max: 99 (16 Jun 2025 17:47)  HR: 85 (17 Jun 2025 05:54) (82 - 93)  BP: 135/90 (17 Jun 2025 05:54) (124/74 - 136/81)  BP(mean): --  RR: 17 (17 Jun 2025 05:54) (17 - 18)  SpO2: 100% (17 Jun 2025 05:54) (99% - 100%)    Parameters below as of 17 Jun 2025 05:54  Patient On (Oxygen Delivery Method): room air      CONSTITUTIONAL: NAD  EYES: PERRLA  ENMT: Moist oral mucosa  RESPIRATORY: Normal respiratory effort; lungs are clear to auscultation bilaterally  CARDIOVASCULAR: Regular rate and rhythm, normal S1 and S2, no murmur/rub/gallop  ABDOMEN: Nontender to palpation, normoactive bowel sounds  PSYCH: A+O to person, place, and time  NEUROLOGY: CN 2-12 are intact and symmetric; no gross sensory deficits     LABS:                        9.0    12.40 )-----------( 489      ( 17 Jun 2025 05:54 )             27.9     06-17    136  |  102  |  26[H]  ----------------------------<  140[H]  4.0   |  23  |  0.87    Ca    8.9      17 Jun 2025 05:54  Phos  3.2     06-17  Mg     2.20     06-17    TPro  6.9  /  Alb  3.1[L]  /  TBili  1.8[H]  /  DBili  x   /  AST  17  /  ALT  8   /  AlkPhos  57  06-16          Urinalysis Basic - ( 17 Jun 2025 05:54 )    Color: x / Appearance: x / SG: x / pH: x  Gluc: 140 mg/dL / Ketone: x  / Bili: x / Urobili: x   Blood: x / Protein: x / Nitrite: x   Leuk Esterase: x / RBC: x / WBC x   Sq Epi: x / Non Sq Epi: x / Bacteria: x

## 2025-06-17 NOTE — CHART NOTE - NSCHARTNOTEFT_GEN_A_CORE
Patients WBC is stable today at 12 from 13. Agree with plan for no further drainage and continue antibiotic management, f/u ID recommendations regarding abx plan.     Regarding patients TOV, would continue to check PVR's after voids and if continue to be elevated above 150cc would recommend replacing catheter and plan to discharge patient with catheter. Continue flomax. Discussed with patient that catheter may need to be replaced and he is amenable. Patient should follow with Dr. Olmos in outpatient setting for reimaging after duration of antibiotic is complete.    [facility]  [address] [place] [address], [address] [address] [phone] Patients WBC is stable today at 12 from 13. Agree with plan for no further drainage and continue antibiotic management, f/u ID recommendations regarding abx plan.     Regarding patients TOV, would continue to check PVR's after voids and if continue to be elevated above 150cc would recommend replacing catheter and plan to discharge patient with catheter. Continue flomax. Discussed with patient that catheter may need to be replaced and he is amenable. Patient should follow with Dr. Olmos in outpatient setting for reimaging after duration of antibiotic is complete.    No further recommendations from urologic perspective, urology to sign off please reconsult as needed. For reconsults page [phone].     [facility]  [address] [place] [address], [place] [id number] [phone]

## 2025-06-17 NOTE — PROGRESS NOTE ADULT - SUBJECTIVE AND OBJECTIVE BOX
CC: F/U for Prostate Abscess    Saw/spoke to patient. NO fevers, no chills. No new complaints.    Allergies  No Known Allergies    ANTIMICROBIALS:  ceFAZolin   IVPB 2000 every 8 hours    PE:    Vital Signs Last 24 Hrs  T(C): 36.7 (17 Jun 2025 05:54), Max: 37.2 (16 Jun 2025 17:47)  T(F): 98.1 (17 Jun 2025 05:54), Max: 99 (16 Jun 2025 17:47)  HR: 85 (17 Jun 2025 05:54) (82 - 93)  BP: 135/90 (17 Jun 2025 05:54) (124/74 - 136/81)  RR: 17 (17 Jun 2025 05:54) (17 - 18)  SpO2: 100% (17 Jun 2025 05:54) (99% - 100%)    Gen: AOx3, NAD, non-toxic  CV: Nontachycardic  Resp: Breathing comfortably, RA  Abd: Soft, nontender  IV/Skin: No thrombophlebitis    LABS:                        9.0    12.40 )-----------( 489      ( 17 Jun 2025 05:54 )             27.9     06-17    136  |  102  |  26[H]  ----------------------------<  140[H]  4.0   |  23  |  0.87    Ca    8.9      17 Jun 2025 05:54  Phos  3.2     06-17  Mg     2.20     06-17    TPro  6.9  /  Alb  3.1[L]  /  TBili  1.8[H]  /  DBili  x   /  AST  17  /  ALT  8   /  AlkPhos  57  06-16    Urinalysis Basic - ( 17 Jun 2025 05:54 )    Color: x / Appearance: x / SG: x / pH: x  Gluc: 140 mg/dL / Ketone: x  / Bili: x / Urobili: x   Blood: x / Protein: x / Nitrite: x   Leuk Esterase: x / RBC: x / WBC x   Sq Epi: x / Non Sq Epi: x / Bacteria: x    MICROBIOLOGY:    Blood Blood-Peripheral  06-14-25   No growth at 72 Hours  --  --    Blood Blood  06-14-25   No growth at 72 Hours  --  --    Clean Catch  06-13-25   No growth  --  --    Blood Blood-Peripheral  06-10-25   Growth in anaerobic bottle: Staphylococcus aureus  Direct identification is available within approximately 3-5  hours either by Blood Panel Multiplexed PCR or Direct  MALDI-TOF. Details: [email or web address]  --  Blood Culture PCR  Staphylococcus aureus    Blood Blood-Peripheral  06-10-25   No growth at 5 days  --  --    Clean Catch Clean Catch (Midstream)  06-09-25   >100,000 CFU/ml Staphylococcus aureus  --  Staphylococcus aureus    RADIOLOGY:    6/13 US    IMPRESSION:    Mild hyperemia of the left testis and mild prominence of the left   epididymis with mild hypoechogenicity. Question left epididymoorchitis.  Unremarkable right testis.

## 2025-06-17 NOTE — PROGRESS NOTE ADULT - ASSESSMENT
55M with history of DM2 and HTN (not on medications currently for either) who presents to the hospital with complaints of difficulty urinating since last night. Said that for the past 1 week he has been having urinary frequency, urgency, and dysuria. Had some hematuria initially with these symptoms but that resolved soon after it had started. Was also having chills and diaphoresis but no fevers and therefore did not seek medical attention. Said that since last night he would feel the urge to urinate but would only dribble a little and have a sensation of incomplete voiding. This continued for a while and he started to have pelvic pain and discomfort and therefore came to the hospital. Here had a darnell placed with almost immediate relief in his symptoms. Currently states he has some mild abdominal pain and had 1 episode of NBNB emesis in the ED. Also reports having chronic peripheral neuropathy x years of his feet. Otherwise denies any acute complaints. Endocrine consulted for uncontrolled diabetes, A1c 13.    Endocrine history:   DM dx: diagnosed with type 2 diabetes 5 to 6 years ago, thinks his A1c back then was 10  Medications: metformin 500 mg twice daily – stopped 2 to 3 years ago. Patient states that metformin was not working and was not doing anything for his diabetes since he was still hyperglycemic at home.  Fingersticks/CGM: Does not use CGM.  When he was checking his fingerstick, it was usually 200-300s.  He stopped checking his fingerstick a long time ago.   Diet: Patient stated that he tries to control his diabetes with diet.  He eats a lot of vegetables and makes his own juice–papaya.  He eats rice, bread.  Endocrinologist: None  Ophthalmologist evaluation: Last eye exam was 3 to 4 years ago.  Patient currently unemployed.  He has no medical insurance.  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  Patient not in DKA.     Inpatient plan   - FS goal 100-180 mg/dl   - FS above goal   - Continue Lantus 19 units sq changed to 8 pm today ---> move 2 hours daily until reaches bedtime   - Increase Admelog to 8 units TID AC.  Hold Admelog if not eating/NPO.  - continue low Admelog correctional scale TID AC  - continue separate low Admelog correctional scale at HS  - FS TID AC & HS ---> q6 if NPO   - hypoglycemia protocol PRN   - consistent carbohydrate diet  - RD consult  - provider to RN - insulin pen teaching    Discharge plan  - Patient uninsured.  - Pt is enrolled in Transitions of care program and [facility]  - Will need to send all DM meds and supplies to [facility] meds to bed must be sent prior to 10am   - Please send Basaglar kwik pen sq qhs (dose TBD) and Humalog Kwik pen TID AC (dose TBD)   - Discharge on basal/bolus doses TBD + metformin 500 mg PO BID for 7 days then increase to 1000 mg PO BID if labs allow   - Please send prescription for Basaglar pen + Humalog pen  Coupon code:  LV: [id number]  Group: [id number]  Vein: [id number]  PCN: SSN  - please send prescription for glucometer and supplies - test strip, lancets, alcohol pads and insulin pen needles.   - Please send prescription for glucose tablets 4G (take 4 tablets) or 15G tablets for blood sugar less than 70 mG/dL, repeat fingerstick in 15 minutes. Call your provider for dose adjustment if needed.   - Please call your doctor if you fs is 70 or below and or 250 and above   - Review importance of medication adherence,  glucose monitoring, and following a consistent carb diet   - Review Hypoglycemia and intervention   - Please follow up with your pcp, podiatry, endocrinology, and opthalmology as an outpt   - Follow up at [facility] [place]: [address] [place] [address]; Ph # [phone]: emailed for a medicine and endocrine appointment     HTN  - outpatient BP goal <130/80   - continue enalapril   - Please obtain urine micro albumin cr ratio as an outpt   - management per primary team     HLD   - LDL goal <70   - Check lipid panel if not done recently   - Consider add Lipitor 10mg p.o. qhs if no contraindications   - management per primary team       D/w Barbra Toth  Nurse Practitioner  Division of Endocrinology & Diabetes  Available Microsoft Teams    If before 9AM or after 6PM, or on weekends/holidays, please call endocrine answering service for assistance ([phone]).For nonurgent matters email [email or web address] for assistance.  55M with history of DM2 and HTN (not on medications currently for either) who presents to the hospital with complaints of difficulty urinating since last night. Said that for the past 1 week he has been having urinary frequency, urgency, and dysuria. Had some hematuria initially with these symptoms but that resolved soon after it had started. Was also having chills and diaphoresis but no fevers and therefore did not seek medical attention. Said that since last night he would feel the urge to urinate but would only dribble a little and have a sensation of incomplete voiding. This continued for a while and he started to have pelvic pain and discomfort and therefore came to the hospital. Here had a darnell placed with almost immediate relief in his symptoms. Currently states he has some mild abdominal pain and had 1 episode of NBNB emesis in the ED. Also reports having chronic peripheral neuropathy x years of his feet. Otherwise denies any acute complaints. Endocrine consulted for uncontrolled diabetes, A1c 13.    Endocrine history:   DM dx: diagnosed with type 2 diabetes 5 to 6 years ago, thinks his A1c back then was 10  Medications: metformin 500 mg twice daily – stopped 2 to 3 years ago. Patient states that metformin was not working and was not doing anything for his diabetes since he was still hyperglycemic at home.  Fingersticks/CGM: Does not use CGM.  When he was checking his fingerstick, it was usually 200-300s.  He stopped checking his fingerstick a long time ago.   Diet: Patient stated that he tries to control his diabetes with diet.  He eats a lot of vegetables and makes his own juice–papaya.  He eats rice, bread.  Endocrinologist: None  Ophthalmologist evaluation: Last eye exam was 3 to 4 years ago.  Patient currently unemployed.  He has no medical insurance.  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  Patient not in DKA.     Inpatient plan   - FS goal 100-180 mg/dl   - FS above goal   - Continue Lantus 19 units sq changed to 8 pm today ---> move 2 hours daily until reaches bedtime   - Increase Admelog to 8 units TID AC.  Hold Admelog if not eating/NPO.  - continue low Admelog correctional scale TID AC  - continue separate low Admelog correctional scale at HS  - FS TID AC & HS ---> q6 if NPO   - hypoglycemia protocol PRN   - consistent carbohydrate diet  - RD consult  - provider to RN - insulin pen teaching    Discharge plan  - Patient uninsured.  - Pt is enrolled in Transitions of care program with plan to enroll in dispensary of [facility] program as an outpt   - Will need to send all DM meds and supplies to [facility] meds to bed must be sent prior to 10am   - Please send Basaglar kwik pen sq qhs (dose TBD) and Humalog Kwik pen TID AC (dose TBD)   - Discharge on basal/bolus doses TBD + metformin 500 mg PO BID for 7 days then increase to 1000 mg PO BID if labs allow   - Please send prescription for Basaglar pen + Humalog pen  Coupon code:  LV: [id number]  Group: [id number]  Vein: [id number]  PCN: SSN  - please send prescription for glucometer and supplies - test strip, lancets, alcohol pads and insulin pen needles.   - Please send prescription for glucose tablets 4G (take 4 tablets) or 15G tablets for blood sugar less than 70 mG/dL, repeat fingerstick in 15 minutes. Call your provider for dose adjustment if needed.   - Please call your doctor if you fs is 70 or below and or 250 and above   - Review importance of medication adherence,  glucose monitoring, and following a consistent carb diet   - Review Hypoglycemia and intervention   - Please follow up with your pcp, podiatry, endocrinology, and opthalmology as an outpt   - Follow up at [facility] [place]: [address] [place] [address]; Ph # [phone]: emailed for a medicine and endocrine appointment     HTN  - outpatient BP goal <130/80   - continue enalapril   - Please obtain urine micro albumin cr ratio as an outpt   - management per primary team     HLD   - LDL goal <70   - Check lipid panel if not done recently   - Consider add Lipitor 10mg p.o. qhs if no contraindications   - management per primary team       D/w Barbra Toth  Nurse Practitioner  Division of Endocrinology & Diabetes  Available Microsoft Teams    If before 9AM or after 6PM, or on weekends/holidays, please call endocrine answering service for assistance ([phone]).For nonurgent matters email [email or web address] for assistance.  55M with history of DM2 and HTN (not on medications currently for either) who presents to the hospital with complaints of difficulty urinating since last night. Said that for the past 1 week he has been having urinary frequency, urgency, and dysuria. Had some hematuria initially with these symptoms but that resolved soon after it had started. Was also having chills and diaphoresis but no fevers and therefore did not seek medical attention. Said that since last night he would feel the urge to urinate but would only dribble a little and have a sensation of incomplete voiding. This continued for a while and he started to have pelvic pain and discomfort and therefore came to the hospital. Here had a darnell placed with almost immediate relief in his symptoms. Currently states he has some mild abdominal pain and had 1 episode of NBNB emesis in the ED. Also reports having chronic peripheral neuropathy x years of his feet. Otherwise denies any acute complaints. Endocrine consulted for uncontrolled diabetes, A1c 13.    Endocrine history:   DM dx: diagnosed with type 2 diabetes 5 to 6 years ago, thinks his A1c back then was 10  Medications: metformin 500 mg twice daily – stopped 2 to 3 years ago. Patient states that metformin was not working and was not doing anything for his diabetes since he was still hyperglycemic at home.  Fingersticks/CGM: Does not use CGM.  When he was checking his fingerstick, it was usually 200-300s.  He stopped checking his fingerstick a long time ago.   Diet: Patient stated that he tries to control his diabetes with diet.  He eats a lot of vegetables and makes his own juice–papaya.  He eats rice, bread.  Endocrinologist: None  Ophthalmologist evaluation: Last eye exam was 3 to 4 years ago.  Patient currently unemployed.  He has no medical insurance.  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  Patient not in DKA.     Inpatient plan   - FS goal 100-180 mg/dl   - FS above goal   - Continue Lantus 19 units sq changed to 8 pm today ---> move 2 hours daily until reaches bedtime   - Increase Admelog to 8 units TID AC.  Hold Admelog if not eating/NPO.  - continue low Admelog correctional scale TID AC  - continue separate low Admelog correctional scale at HS  - FS TID AC & HS ---> q6 if NPO   - hypoglycemia protocol PRN   - consistent carbohydrate diet  - RD consult  - provider to RN - insulin pen teaching    Discharge plan  - Patient uninsured.  - Pt is enrolled in Transitions of care program with plan to enroll in dispensary of [facility] program as an outpt   - Please send Basaglar kwik pen sq qhs (dose TBD) and Humalog Kwik pen TID AC (dose TBD)   - Discharge on basal/bolus doses TBD + metformin 500 mg PO BID for 7 days then increase to 1000 mg PO BID if labs allow   - Please send prescription for Basaglar pen + Humalog pen  Coupon code:  LV: [id number]  Group: [id number]  Vein: [id number]  PCN: SSN  - please send prescription for glucometer and supplies - test strip, lancets, alcohol pads and insulin pen needles.   - Please send prescription for glucose tablets 4G (take 4 tablets) or 15G tablets for blood sugar less than 70 mG/dL, repeat fingerstick in 15 minutes. Call your provider for dose adjustment if needed.   - Please call your doctor if you fs is 70 or below and or 250 and above   - Review importance of medication adherence,  glucose monitoring, and following a consistent carb diet   - Review Hypoglycemia and intervention   - Please follow up with your pcp, podiatry, endocrinology, and opthalmology as an outpt   - Follow up at [facility] [place]: [address] [place] [address]; Ph # [phone]: emailed for a medicine and endocrine appointment   -  will provide indigent funding     HTN  - outpatient BP goal <130/80   - continue enalapril   - Please obtain urine micro albumin cr ratio as an outpt   - management per primary team     HLD   - LDL goal <70   - Check lipid panel if not done recently   - Consider add Lipitor 10mg p.o. qhs if no contraindications   - management per primary team       D/w Barbra Toth  Nurse Practitioner  Division of Endocrinology & Diabetes  Available Microsoft Teams    If before 9AM or after 6PM, or on weekends/holidays, please call endocrine answering service for assistance ([phone]).For nonurgent matters email [email or web address] for assistance.

## 2025-06-17 NOTE — CHART NOTE - NSCHARTNOTEFT_GEN_A_CORE
Patient Age: 55    Patient Gender: M    Procedure (including site/side if known): IR PICC    Diagnosis/Indication: Bacteremia    Interventional Radiology Attending Physician: N/A    Ordering Attending Physician: Dr. Ortiz    Pertinent medical history: 54y/o M, with PMHx of DM, HTN (not currently taking any meds for either) presenting with acute urinary retention x 3 hours admitted for pain, dysuria. Ho inserted in ED, -1L drained. Admitted with sepsis 2/2 UTI and uncontrolled DM2 with significant hyperglycemia    Pertinent Labs:                        9.0    12.40 )-----------( 489      ( 17 Jun 2025 05:54 )             27.9     06-17    136  |  102  |  26[H]  ----------------------------<  140[H]  4.0   |  23  |  0.87    Ca    8.9      17 Jun 2025 05:54  Phos  3.2     06-17  Mg     2.20     06-17    TPro  6.9  /  Alb  3.1[L]  /  TBili  1.8[H]  /  DBili  x   /  AST  17  /  ALT  8   /  AlkPhos  57  06-16          Patient and Family aware? Yes      Attending/Resident/NP/PA Barbra Montalvo ANP-C      Approval Obtained From:      Contact: [phone]               Date: 6/17/25                    time:10:22 am

## 2025-06-17 NOTE — PROGRESS NOTE ADULT - ASSESSMENT
56yo M presenting with primary urinary complaints found to have prostatic abscess on imaging. CT scan shows two large 3cm abscesses in the apex of the prostate and a smaller abscess near the base.     Given that patient is bacteremic and size of abscesses on imaging recommend drainage with IR.   Would avoid TUR prostate given apical location near the sphincter and higher risk for urinary incontinence. Also high rate of ejaculatory dysfunction.    Recommendations:  - Continue abx  - Repeat blood cultures  - WBC uptrending today, if continues to uptrend tomorrow would recommend reconsulting IR for drainage -> f/u WBC today  - Per IR, fluid collections too small for drain placement and recommended conservative management at this time, can re-image/re-consult if pt does not improve or clinically worsens  - Continue flomax and finasteride  - Can remove darnell catheter today and give patient TOV, if patient fails TOV, catheter should be replaced and patient should be discharged with darnell catheter. -> patient voided 300cc with PVR of 340cc. Would give patient more time but if PVR still elevated would recommend placement of darnell catheter and plan for opt TOV. Would not straight cath patient   - F/u ID regarding final abx plan, once patient has finished final antibiotic course should follow with up urology to ensure that prostatic abscess have resolved with reimaging  - IR deferring drainage of prostatic abscess at this time given size  - Discussed with Dr. Olmos      [facility]  [address] [place] [address], [address]  [place] [address] [phone]

## 2025-06-18 LAB
ANION GAP SERPL CALC-SCNC: 10 MMOL/L — SIGNIFICANT CHANGE UP (ref 7–14)
BUN SERPL-MCNC: 22 MG/DL — SIGNIFICANT CHANGE UP (ref 7–23)
CALCIUM SERPL-MCNC: 9.1 MG/DL — SIGNIFICANT CHANGE UP (ref 8.4–10.5)
CHLORIDE SERPL-SCNC: 102 MMOL/L — SIGNIFICANT CHANGE UP (ref 98–107)
CO2 SERPL-SCNC: 26 MMOL/L — SIGNIFICANT CHANGE UP (ref 22–31)
CREAT SERPL-MCNC: 0.88 MG/DL — SIGNIFICANT CHANGE UP (ref 0.5–1.3)
EGFR: 101 ML/MIN/1.73M2 — SIGNIFICANT CHANGE UP
EGFR: 101 ML/MIN/1.73M2 — SIGNIFICANT CHANGE UP
GLUCOSE BLDC GLUCOMTR-MCNC: 141 MG/DL — HIGH (ref 70–99)
GLUCOSE BLDC GLUCOMTR-MCNC: 193 MG/DL — HIGH (ref 70–99)
GLUCOSE BLDC GLUCOMTR-MCNC: 258 MG/DL — HIGH (ref 70–99)
GLUCOSE BLDC GLUCOMTR-MCNC: 74 MG/DL — SIGNIFICANT CHANGE UP (ref 70–99)
GLUCOSE SERPL-MCNC: 62 MG/DL — LOW (ref 70–99)
MAGNESIUM SERPL-MCNC: 2.3 MG/DL — SIGNIFICANT CHANGE UP (ref 1.6–2.6)
PHOSPHATE SERPL-MCNC: 3.9 MG/DL — SIGNIFICANT CHANGE UP (ref 2.5–4.5)
POTASSIUM SERPL-MCNC: 4 MMOL/L — SIGNIFICANT CHANGE UP (ref 3.5–5.3)
POTASSIUM SERPL-SCNC: 4 MMOL/L — SIGNIFICANT CHANGE UP (ref 3.5–5.3)
SODIUM SERPL-SCNC: 138 MMOL/L — SIGNIFICANT CHANGE UP (ref 135–145)

## 2025-06-18 PROCEDURE — 99232 SBSQ HOSP IP/OBS MODERATE 35: CPT

## 2025-06-18 RX ORDER — INSULIN GLARGINE-YFGN 100 [IU]/ML
15 INJECTION, SOLUTION SUBCUTANEOUS AT BEDTIME
Refills: 0 | Status: DISCONTINUED | OUTPATIENT
Start: 2025-06-18 | End: 2025-06-19

## 2025-06-18 RX ORDER — INSULIN LISPRO 100 U/ML
5 INJECTION, SOLUTION INTRAVENOUS; SUBCUTANEOUS
Refills: 0 | Status: DISCONTINUED | OUTPATIENT
Start: 2025-06-18 | End: 2025-06-19

## 2025-06-18 RX ADMIN — INSULIN LISPRO 7 UNIT(S): 100 INJECTION, SOLUTION INTRAVENOUS; SUBCUTANEOUS at 09:20

## 2025-06-18 RX ADMIN — Medication 1 APPLICATION(S): at 12:45

## 2025-06-18 RX ADMIN — INSULIN GLARGINE-YFGN 15 UNIT(S): 100 INJECTION, SOLUTION SUBCUTANEOUS at 22:00

## 2025-06-18 RX ADMIN — TAMSULOSIN HYDROCHLORIDE 0.4 MILLIGRAM(S): 0.4 CAPSULE ORAL at 22:02

## 2025-06-18 RX ADMIN — Medication 100 MILLIGRAM(S): at 06:22

## 2025-06-18 RX ADMIN — Medication 100 MILLIGRAM(S): at 22:01

## 2025-06-18 RX ADMIN — INSULIN LISPRO 3: 100 INJECTION, SOLUTION INTRAVENOUS; SUBCUTANEOUS at 17:40

## 2025-06-18 RX ADMIN — Medication 10 MILLIGRAM(S): at 06:20

## 2025-06-18 RX ADMIN — Medication 100 MILLIGRAM(S): at 15:55

## 2025-06-18 RX ADMIN — INSULIN LISPRO 7 UNIT(S): 100 INJECTION, SOLUTION INTRAVENOUS; SUBCUTANEOUS at 12:44

## 2025-06-18 RX ADMIN — INSULIN LISPRO 5 UNIT(S): 100 INJECTION, SOLUTION INTRAVENOUS; SUBCUTANEOUS at 17:40

## 2025-06-18 RX ADMIN — FINASTERIDE 5 MILLIGRAM(S): 1 TABLET, FILM COATED ORAL at 12:45

## 2025-06-18 NOTE — PROVIDER CONTACT NOTE (OTHER) - DATE AND TIME:
12-Jun-2025 05:07
12-Jun-2025 06:38
12-Jun-2025 15:21
13-Jun-2025 18:10
09-Jun-2025 07:20
11-Jun-2025 07:39
12-Jun-2025 22:38
12-Jun-2025 23:05
12-Jun-2025 15:42
17-Jun-2025 21:40

## 2025-06-18 NOTE — PROGRESS NOTE ADULT - SUBJECTIVE AND OBJECTIVE BOX
Chief Complaint: T2DM    Interval Events: Pt seen and examined at bedside earlier today.  Pt tolerating oral diet and denies nausea and vomiting/any signs of hypoglycemia. Pt reports an adequate appetite. Instructed patient to avoid eating in-between meals and to wait to consume his meal until the FS is obtained and the insulin is given. Pt express understanding.    MEDICATIONS  (STANDING):  ceFAZolin   IVPB 2000 milliGRAM(s) IV Intermittent every 8 hours  chlorhexidine 2% Cloths 1 Application(s) Topical daily  dextrose 5%. 1000 milliLiter(s) (100 mL/Hr) IV Continuous <Continuous>  dextrose 5%. 1000 milliLiter(s) (50 mL/Hr) IV Continuous <Continuous>  dextrose 50% Injectable 25 Gram(s) IV Push once  dextrose 50% Injectable 12.5 Gram(s) IV Push once  dextrose 50% Injectable 25 Gram(s) IV Push once  enalapril 10 milliGRAM(s) Oral daily  finasteride 5 milliGRAM(s) Oral daily  glucagon  Injectable 1 milliGRAM(s) IntraMuscular once  insulin glargine Injectable (LANTUS) 15 Unit(s) SubCutaneous at bedtime  insulin lispro (ADMELOG) corrective regimen sliding scale   SubCutaneous three times a day before meals  insulin lispro (ADMELOG) corrective regimen sliding scale   SubCutaneous at bedtime  insulin lispro Injectable (ADMELOG) 7 Unit(s) SubCutaneous three times a day before meals  tamsulosin 0.4 milliGRAM(s) Oral at bedtime    MEDICATIONS  (PRN):  acetaminophen     Tablet .. 650 milliGRAM(s) Oral every 6 hours PRN Temp greater or equal to 38C (100.4F), Mild Pain (1 - 3)  dextrose Oral Gel 15 Gram(s) Oral once PRN Blood Glucose LESS THAN 70 milliGRAM(s)/deciliter      Allergies    No Known Allergies    Intolerances      Review of Systems:  Eyes: No blurry vision  Cardiovascular: No chest pain, palpitations  Respiratory: No SOB, no cough  GI: No nausea, vomiting, abdominal pain  : No dysuria    VITALS: T(C): 36.5 (06-18-25 @ 15:28)  T(F): 97.7 (06-18-25 @ 15:28), Max: 97.9 (06-18-25 @ 05:55)  HR: 94 (06-18-25 @ 15:28) (85 - 94)  BP: 106/69 (06-18-25 @ 15:28) (106/69 - 136/95)  RR:  (17 - 18)  SpO2:  (100% - 100%)  Wt(kg): --      Physical Exam:   GENERAL: NAD, well-developed  EYES: No proptosis  HEENT:  Atraumatic, Normocephalic  RESPIRATORY: non labored breathing   GI: Non distended  PSYCH: Alert, normal affect, normal mood    CAPILLARY BLOOD GLUCOSE    POCT Blood Glucose.: 141 mg/dL (18 Jun 2025 12:29)  POCT Blood Glucose.: 74 mg/dL (18 Jun 2025 08:43)  POCT Blood Glucose.: 107 mg/dL (17 Jun 2025 22:34)  POCT Blood Glucose.: 181 mg/dL (17 Jun 2025 19:26)  POCT Blood Glucose.: 183 mg/dL (17 Jun 2025 17:53)      06-18    138  |  102  |  22  ----------------------------<  62[L]  4.0   |  26  |  0.88    eGFR: 101    Ca    9.1      06-18  Mg     2.30     06-18  Phos  3.9     06-18    TPro  6.9  /  Alb  3.1[L]  /  TBili  1.8[H]  /  DBili  x   /  AST  17  /  ALT  8   /  AlkPhos  57  06-16      A1C with Estimated Average Glucose Result: 13.0 % (06-09-25 @ 03:29)      Thyroid Function Tests:

## 2025-06-18 NOTE — PROGRESS NOTE ADULT - ASSESSMENT
56 y/o M w/ PMHx of DM2, HTN,  admitted to [facility] on 6/9 for difficulty urinating, w/ some dysuria, hematuria  afebrile, noted to have WBC ot 17.6  positive U/A. UCx and 1/4 BCx with MSSA  Per patient has had dysuria since admission, then had darnell placed during admission  CT urinary bladder thickening/cystitis; lesion of prostate, abscesses  USG possible epididymitis/oorchitis  With no other source of MSSA, could this be a rare MSSA UTI/prostate abscess? (still very unusual)  TTE reassuring  Overall, MSSA Bacteremia, urinary retention  - Cefazolin 2g q 8, plan for 4 weeks from negative BCX  - Okay to place midline/PICC when most recent BCXs negative x 48 hours and no new signs infection  - Check CBC, BUN, Cr, LFTs weekly, send to [email or web address]  - Trend WBC to normal  - Defer any role for drainage to Urology  - Hold on NASIR, suspecting  source    Jonathan Farfan MD  Contact on TEAMS messaging from 9am - 5pm  From 5pm-9am, on weekends, or if no response call [phone]    Antibiotic decision making based on local antibiogram and available recent culture results

## 2025-06-18 NOTE — PROVIDER CONTACT NOTE (OTHER) - REASON
High bp
Pt /95
Pt Bladder scan was 508
High bp
High bp
Bladder scan exceeds 500 (546 cc)
Hyperglycemia
Pt bladder scan 607 PVR
Pt's blood pressure is 150/105
PT blood sugar 81

## 2025-06-18 NOTE — PROGRESS NOTE ADULT - ASSESSMENT
55M with history of DM2 and HTN (not on medications currently for either) who presents to the hospital with complaints of difficulty urinating since last night. Said that for the past 1 week he has been having urinary frequency, urgency, and dysuria. Had some hematuria initially with these symptoms but that resolved soon after it had started. Was also having chills and diaphoresis but no fevers and therefore did not seek medical attention. Said that since last night he would feel the urge to urinate but would only dribble a little and have a sensation of incomplete voiding. This continued for a while and he started to have pelvic pain and discomfort and therefore came to the hospital. Here had a darnell placed with almost immediate relief in his symptoms. Currently states he has some mild abdominal pain and had 1 episode of NBNB emesis in the ED. Also reports having chronic peripheral neuropathy x years of his feet. Otherwise denies any acute complaints. Endocrine consulted for uncontrolled diabetes, A1c 13.    Endocrine history:   DM dx: diagnosed with type 2 diabetes 5 to 6 years ago, thinks his A1c back then was 10  Medications: metformin 500 mg twice daily – stopped 2 to 3 years ago. Patient states that metformin was not working and was not doing anything for his diabetes since he was still hyperglycemic at home.  Fingersticks/CGM: Does not use CGM.  When he was checking his fingerstick, it was usually 200-300s.  He stopped checking his fingerstick a long time ago.   Diet: Patient stated that he tries to control his diabetes with diet.  He eats a lot of vegetables and makes his own juice–papaya.  He eats rice, bread.  Endocrinologist: None  Ophthalmologist evaluation: Last eye exam was 3 to 4 years ago.  Patient currently unemployed.  He has no medical insurance.  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  Patient not in DKA.     Inpatient plan   - FS goal 100-180 mg/dl: hypoglycemia this am with symptoms  - Decrease Lantus to 15 units at bedtime. Do not hold if NPO.   - Decrease Admelog to 5 units TID AC.  Hold Admelog if not eating/NPO.  - continue low Admelog correctional scale TID AC  - continue separate low Admelog correctional scale at HS  - FS TID AC & HS ---> q6 if NPO   - hypoglycemia protocol PRN   - consistent carbohydrate diet  - RD consult  - provider to RN - insulin pen teaching    Discharge plan  - Patient uninsured.  - Pt is enrolled in Transitions of care program with plan to enroll in dispensary of [facility] program as an outpt   - Please send Basaglar kwik pen sq qhs (dose TBD) and Humalog Kwik pen TID AC (dose TBD)   - Discharge on basal/bolus doses TBD + metformin 500 mg PO BID for 7 days then increase to 1000 mg PO BID if labs allow   - Please send prescription for Basaglar pen + Humalog pen  Coupon code:  LV: [id number]  Group: [id number]  Vein: [id number]  PCN: SSN  - please send prescription for glucometer and supplies - test strip, lancets, alcohol pads and insulin pen needles.   - Please send prescription for glucose tablets 4G (take 4 tablets) or 15G tablets for blood sugar less than 70 mG/dL, repeat fingerstick in 15 minutes. Call your provider for dose adjustment if needed.   - Please call your doctor if you fs is 70 or below and or 250 and above   - Review importance of medication adherence,  glucose monitoring, and following a consistent carb diet   - Review Hypoglycemia and intervention   - Please follow up with your pcp, podiatry, endocrinology, and opthalmology as an outpt   - Follow up at [facility] [place]: [address] [place] [address]; Ph # [phone]: emailed for a medicine and endocrine appointment   -  will provide indigent funding     HTN  - outpatient BP goal <130/80   - continue enalapril   - Please obtain urine micro albumin cr ratio as an outpt   - management per primary team     HLD   - LDL goal <70   - Check lipid panel if not done recently   - Consider add Lipitor 10mg p.o. qhs if no contraindications   - management per primary team       D/w Barbra Toth  Nurse Practitioner  Division of Endocrinology & Diabetes  Available Microsoft Teams    If before 9AM or after 6PM, or on weekends/holidays, please call endocrine answering service for assistance ([phone]).For nonurgent matters email [email or web address] for assistance.  55M with history of DM2 and HTN (not on medications currently for either) who presents to the hospital with complaints of difficulty urinating since last night. Said that for the past 1 week he has been having urinary frequency, urgency, and dysuria. Had some hematuria initially with these symptoms but that resolved soon after it had started. Was also having chills and diaphoresis but no fevers and therefore did not seek medical attention. Said that since last night he would feel the urge to urinate but would only dribble a little and have a sensation of incomplete voiding. This continued for a while and he started to have pelvic pain and discomfort and therefore came to the hospital. Here had a darnell placed with almost immediate relief in his symptoms. Currently states he has some mild abdominal pain and had 1 episode of NBNB emesis in the ED. Also reports having chronic peripheral neuropathy x years of his feet. Otherwise denies any acute complaints. Endocrine consulted for uncontrolled diabetes, A1c 13.    Endocrine history:   DM dx: diagnosed with type 2 diabetes 5 to 6 years ago, thinks his A1c back then was 10  Medications: metformin 500 mg twice daily – stopped 2 to 3 years ago. Patient states that metformin was not working and was not doing anything for his diabetes since he was still hyperglycemic at home.  Fingersticks/CGM: Does not use CGM.  When he was checking his fingerstick, it was usually 200-300s.  He stopped checking his fingerstick a long time ago.   Diet: Patient stated that he tries to control his diabetes with diet.  He eats a lot of vegetables and makes his own juice–papaya.  He eats rice, bread.  Endocrinologist: None  Ophthalmologist evaluation: Last eye exam was 3 to 4 years ago.  Patient currently unemployed.  He has no medical insurance.  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  Patient not in DKA.     Inpatient plan   - FS goal 100-180 mg/dl: hypoglycemia this am with symptoms  - Decrease Lantus to 15 units at bedtime. Do not hold if NPO.   - Decrease Admelog to 5 units TID AC.  Hold Admelog if not eating/NPO.  - continue low Admelog correctional scale TID AC  - continue separate low Admelog correctional scale at HS  - FS TID AC & HS ---> q6 if NPO   - hypoglycemia protocol PRN   - consistent carbohydrate diet  - RD consult  - provider to RN - insulin pen teaching    Discharge plan  - Patient uninsured.  - Pt is enrolled in Transitions of care program with plan to enroll in dispens[facility] program as an outpt   - Please send Basaglar kwik pen sq qhs (dose TBD) and Humalog Kwik pen TID AC (dose TBD)   - Discharge on basal/bolus doses TBD + metformin 500 mg PO BID for 7 days then increase to 1000 mg PO BID if labs allow   - Please send prescription for Basaglar pen + Humalog pen  Coupon code:  LV: [id number]  Group: [id number]  Vein: [id number]  PCN: SSN  - please send prescription for glucometer and supplies - test strip, lancets, alcohol pads and insulin pen needles.   - Please send prescription for glucose tablets 4G (take 4 tablets) or 15G tablets for blood sugar less than 70 mG/dL, repeat fingerstick in 15 minutes. Call your provider for dose adjustment if needed.   - Please call your doctor if you fs is 70 or below and or 250 and above   - Review importance of medication adherence,  glucose monitoring, and following a consistent carb diet   - Review Hypoglycemia and intervention   - Please follow up with your pcp, podiatry, endocrinology, and opthalmology as an outpt   - Follow up at Endocrine [facility] at [facility] [place]: [address] [place] [address]; Ph # [phone]: emailed for a medicine and endocrine appointment   -  will provide indigent funding     HTN  - outpatient BP goal <130/80   - continue enalapril   - Please obtain urine micro albumin cr ratio as an outpt   - management per primary team     HLD   - LDL goal <70   - Check lipid panel if not done recently   - Consider add Lipitor 10mg p.o. qhs if no contraindications   - management per primary team     CHERYL Young-BC  Nurse Practitioner  Division of Endocrinology  Contact on TEAMS    If out of hospital/unavailable when paged, please note: patient will be cared for by another provider on the endocrine service.  For urgent concerns: call the endocrine answering service for assistance to reach covering provider ([phone]). For non-urgent matters: please email [email or web address] for assistance.

## 2025-06-18 NOTE — PROGRESS NOTE ADULT - SUBJECTIVE AND OBJECTIVE BOX
Ordering per Dr. Durbin   MRI brain with and without contrast for MS monitoring    [facility] Division of Hospital Medicine  Samuelnegritaobinna Ortiz DO  Available via MS Teams  Pager: [phone]    Patient is a 55y old  Male who presents with a chief complaint of Urinary retention, Sepsis due to UTI, Uncontrolled DM2 (17 Jun 2025 14:20)      SUBJECTIVE / OVERNIGHT EVENTS:    Pt seen and examined this morning. Pt resting comfortably in bed. He failed TOV yesterday evening and darnell was replaced.    ADDITIONAL REVIEW OF SYSTEMS:  No Fever, chills, chest pain, shortness of breath.     MEDICATIONS  (STANDING):  ceFAZolin   IVPB 2000 milliGRAM(s) IV Intermittent every 8 hours  chlorhexidine 2% Cloths 1 Application(s) Topical daily  dextrose 5%. 1000 milliLiter(s) (100 mL/Hr) IV Continuous <Continuous>  dextrose 5%. 1000 milliLiter(s) (50 mL/Hr) IV Continuous <Continuous>  dextrose 50% Injectable 25 Gram(s) IV Push once  dextrose 50% Injectable 12.5 Gram(s) IV Push once  dextrose 50% Injectable 25 Gram(s) IV Push once  enalapril 10 milliGRAM(s) Oral daily  finasteride 5 milliGRAM(s) Oral daily  glucagon  Injectable 1 milliGRAM(s) IntraMuscular once  insulin glargine Injectable (LANTUS) 15 Unit(s) SubCutaneous at bedtime  insulin lispro (ADMELOG) corrective regimen sliding scale   SubCutaneous three times a day before meals  insulin lispro (ADMELOG) corrective regimen sliding scale   SubCutaneous at bedtime  insulin lispro Injectable (ADMELOG) 7 Unit(s) SubCutaneous three times a day before meals  tamsulosin 0.4 milliGRAM(s) Oral at bedtime    MEDICATIONS  (PRN):  acetaminophen     Tablet .. 650 milliGRAM(s) Oral every 6 hours PRN Temp greater or equal to 38C (100.4F), Mild Pain (1 - 3)  dextrose Oral Gel 15 Gram(s) Oral once PRN Blood Glucose LESS THAN 70 milliGRAM(s)/deciliter      I&O's Summary    17 Jun 2025 07:01  -  18 Jun 2025 07:00  --------------------------------------------------------  IN: 0 mL / OUT: 2500 mL / NET: -2500 mL        PHYSICAL EXAM:  Vital Signs Last 24 Hrs  T(C): 36.4 (18 Jun 2025 11:01), Max: 36.7 (17 Jun 2025 15:17)  T(F): 97.5 (18 Jun 2025 11:01), Max: 98 (17 Jun 2025 15:17)  HR: 89 (18 Jun 2025 11:01) (84 - 91)  BP: 122/79 (18 Jun 2025 11:01) (122/79 - 136/95)  BP(mean): --  RR: 17 (18 Jun 2025 11:01) (17 - 18)  SpO2: 100% (18 Jun 2025 11:01) (100% - 100%)    Parameters below as of 18 Jun 2025 11:01  Patient On (Oxygen Delivery Method): room air      CONSTITUTIONAL: NAD  EYES: PERRLA  ENMT: Moist oral mucosa  RESPIRATORY: Normal respiratory effort; lungs are clear to auscultation bilaterally  CARDIOVASCULAR: Regular rate and rhythm, normal S1 and S2, no murmur/rub/gallop  ABDOMEN: Nontender to palpation, normoactive bowel sounds  PSYCH: A+O to person, place, and time  NEUROLOGY: CN 2-12 are intact and symmetric; no gross sensory deficits     LABS:                        8.9    10.07 )-----------( 467      ( 18 Jun 2025 05:45 )             27.7     06-18    138  |  102  |  22  ----------------------------<  62[L]  4.0   |  26  |  0.88    Ca    9.1      18 Jun 2025 05:45  Phos  3.9     06-18  Mg     2.30     06-18            Urinalysis Basic - ( 18 Jun 2025 05:45 )    Color: x / Appearance: x / SG: x / pH: x  Gluc: 62 mg/dL / Ketone: x  / Bili: x / Urobili: x   Blood: x / Protein: x / Nitrite: x   Leuk Esterase: x / RBC: x / WBC x   Sq Epi: x / Non Sq Epi: x / Bacteria: x

## 2025-06-18 NOTE — PROVIDER CONTACT NOTE (OTHER) - NAME OF MD/NP/PA/DO NOTIFIED:
DENVER Anderson
Jonathan Edge, ACP
Elisha Chandler
DENVER Edge
Jonathan Edge, ACP
Letha Chandler, ACP
Barbra Hayward
Gabo Vegas
Letha Chandler ACP
Letha Chandler ACP

## 2025-06-18 NOTE — PROVIDER CONTACT NOTE (OTHER) - ACTION/TREATMENT ORDERED:
ACP made aware, darnell order is in and urine UA+CX ordered
ACP to put in a one time dose for a smaller amount of insulin to combat his dinner meal.
ACP notified
ACP made aware via teams
ACP notified
Ho catheter ordered.
ACP made aware via teams
Give sliding scale and keep NPO until lunch
ACP notified
As per ACP, message acknowledged

## 2025-06-18 NOTE — PROGRESS NOTE ADULT - NUTRITIONAL ASSESSMENT
Diet, Regular:   Consistent Carbohydrate {Evening Snack} (CSTCHOSN)  DASH/TLC {Sodium & Cholesterol Restricted} (DASH) (06-09-25 @ 07:12) [Active]

## 2025-06-18 NOTE — PROGRESS NOTE ADULT - SUBJECTIVE AND OBJECTIVE BOX
CC: F/U for Prostatitis    Saw/spoke to patient. No fevers, no chills. No new complaints.    Allergies  No Known Allergies    ANTIMICROBIALS:  ceFAZolin   IVPB 2000 every 8 hours    PE:    Vital Signs Last 24 Hrs  T(C): 36.5 (18 Jun 2025 15:28), Max: 36.6 (17 Jun 2025 22:53)  T(F): 97.7 (18 Jun 2025 15:28), Max: 97.9 (18 Jun 2025 05:55)  HR: 94 (18 Jun 2025 15:28) (85 - 94)  BP: 106/69 (18 Jun 2025 15:28) (106/69 - 136/95)  RR: 18 (18 Jun 2025 15:28) (17 - 18)  SpO2: 100% (18 Jun 2025 15:28) (100% - 100%)    Gen: AOx3, NAD, non-toxic  CV: Nontachycardic  Resp: Breathing comfortably, RA  Abd: Soft, nontender  IV/Skin: No thrombophlebitis    LABS:                        8.9    10.07 )-----------( 467      ( 18 Jun 2025 05:45 )             27.7     06-18    138  |  102  |  22  ----------------------------<  62[L]  4.0   |  26  |  0.88    Ca    9.1      18 Jun 2025 05:45  Phos  3.9     06-18  Mg     2.30     06-18    Urinalysis Basic - ( 18 Jun 2025 05:45 )    Color: x / Appearance: x / SG: x / pH: x  Gluc: 62 mg/dL / Ketone: x  / Bili: x / Urobili: x   Blood: x / Protein: x / Nitrite: x   Leuk Esterase: x / RBC: x / WBC x   Sq Epi: x / Non Sq Epi: x / Bacteria: x    MICROBIOLOGY:    Blood Blood-Peripheral  06-14-25   No growth at 4 days  --  --    Blood Blood  06-14-25   No growth at 4 days  --  --    Clean Catch  06-13-25   No growth  --  --    Blood Blood-Peripheral  06-10-25   Growth in anaerobic bottle: Staphylococcus aureus  Direct identification is available within approximately 3-5  hours either by Blood Panel Multiplexed PCR or Direct  MALDI-TOF. Details: [email or web address]  --  Blood Culture PCR  Staphylococcus aureus    Blood Blood-Peripheral  06-10-25   No growth at 5 days  --  --    Clean Catch Clean Catch (Midstream)  06-09-25   >100,000 CFU/ml Staphylococcus aureus  --  Staphylococcus aureus    RADIOLOGY:    6/12    IMPRESSION:  *  Urinary bladder wall thickening and perivesical fat infiltration,   concerning for cystitis.  *  Several low-density peripherally enhancing lesions in the prostate and   at the junction of the prostate and right seminal vesicle, suspicious for   abscesses, the largest measuring 3.0 cm.  *  Cystic lesion in the right hemiscrotum measuring 2.6 cm. A scrotal   ultrasound can be performed for further evaluation.

## 2025-06-18 NOTE — PROVIDER CONTACT NOTE (OTHER) - ASSESSMENT
Pt is A&Ox4 in stable condition, denies chest pain, no acute distress noted.
Pt is A&Ox4 in stable condition, no acute distress noted.
Pt is alert calm and denies s/s of acute distress
No signs of acute distress or pain
Pt is asymptomatic, denies SOB/, changes in vision, blurriness, headaches, jaw pain, or pain in general
Pt is asymptomatic; denies SOB/, chest pain, blurriness in vision, changes in vision, jaw pain or shoulder pain
Pt is asymptomatic, denies SOB/, changes in vision, blurriness, headaches, jaw pain, or pain in general
Pt is asymptomatic, denies SOB/, changes in vision, blurriness, headaches, jaw pain, or pain in general
Pt trying to void; small amounts voided when attempts made.

## 2025-06-18 NOTE — PROVIDER CONTACT NOTE (OTHER) - SITUATION
During routine vitals, pt bp was found to be 156/99 and HR 99 and oxygen 97%
Bladder scan q8 at 2300 shows 546 cc
Pt Bladder scan was 508.
Pt bladder scan 607 PVR
Pt received in ESSU 1, blood sugar is 445, received 5 units admelog prior to arrival
follow up vitals are taking bp medication (still elevated) 170/98 and HR 97
Pt /95
Patient has order for 8 units admelog pre meal, blood sugar was 81
During routine vitals, pt bp was found to be 174/101 and HR 94
Pt's blood pressure is 150/105

## 2025-06-18 NOTE — PROVIDER CONTACT NOTE (OTHER) - RECOMMENDATIONS
Notify ACP
Make ACP aware
Make ACP aware, awaiting further orders
Make ACP aware, give standing bp medication and repeat 1 hr from medication administration
Notify ACP
Notify provider
Make ACP aware, inquire if darnell is needed

## 2025-06-18 NOTE — PROVIDER CONTACT NOTE (OTHER) - BACKGROUND
Pt admitted with urinary retention
Pt was admitted due to urination retention
Pt was admitted due to retention of urine
Pt was admitted due to urination retention
Pt admitted for UTI and urinary retention
Pt was admitted due to retention of urine
Pt was admitted due to retention of urine
Pt was admitted due to urinary retention
Pt admitted for retention of urine. Hx of hypertension and diabetes.

## 2025-06-19 ENCOUNTER — TRANSCRIPTION ENCOUNTER (OUTPATIENT)
Age: 55
End: 2025-06-19

## 2025-06-19 VITALS
SYSTOLIC BLOOD PRESSURE: 117 MMHG | TEMPERATURE: 98 F | RESPIRATION RATE: 18 BRPM | OXYGEN SATURATION: 100 % | DIASTOLIC BLOOD PRESSURE: 84 MMHG | HEART RATE: 93 BPM

## 2025-06-19 LAB
ANION GAP SERPL CALC-SCNC: 10 MMOL/L — SIGNIFICANT CHANGE UP (ref 7–14)
BASOPHILS # BLD AUTO: 0.08 K/UL — SIGNIFICANT CHANGE UP (ref 0–0.2)
BASOPHILS NFR BLD AUTO: 0.8 % — SIGNIFICANT CHANGE UP (ref 0–2)
BUN SERPL-MCNC: 21 MG/DL — SIGNIFICANT CHANGE UP (ref 7–23)
CALCIUM SERPL-MCNC: 9 MG/DL — SIGNIFICANT CHANGE UP (ref 8.4–10.5)
CHLORIDE SERPL-SCNC: 103 MMOL/L — SIGNIFICANT CHANGE UP (ref 98–107)
CO2 SERPL-SCNC: 26 MMOL/L — SIGNIFICANT CHANGE UP (ref 22–31)
CREAT SERPL-MCNC: 0.92 MG/DL — SIGNIFICANT CHANGE UP (ref 0.5–1.3)
CULTURE RESULTS: SIGNIFICANT CHANGE UP
CULTURE RESULTS: SIGNIFICANT CHANGE UP
EGFR: 98 ML/MIN/1.73M2 — SIGNIFICANT CHANGE UP
EGFR: 98 ML/MIN/1.73M2 — SIGNIFICANT CHANGE UP
EOSINOPHIL # BLD AUTO: 0.11 K/UL — SIGNIFICANT CHANGE UP (ref 0–0.5)
EOSINOPHIL NFR BLD AUTO: 1.1 % — SIGNIFICANT CHANGE UP (ref 0–6)
GLUCOSE BLDC GLUCOMTR-MCNC: 119 MG/DL — HIGH (ref 70–99)
GLUCOSE BLDC GLUCOMTR-MCNC: 167 MG/DL — HIGH (ref 70–99)
GLUCOSE SERPL-MCNC: 147 MG/DL — HIGH (ref 70–99)
HCT VFR BLD CALC: 27.5 % — LOW (ref 39–50)
HGB BLD-MCNC: 8.6 G/DL — LOW (ref 13–17)
IMM GRANULOCYTES # BLD AUTO: 0.07 K/UL — SIGNIFICANT CHANGE UP (ref 0–0.07)
IMM GRANULOCYTES NFR BLD AUTO: 0.7 % — SIGNIFICANT CHANGE UP (ref 0–0.9)
LYMPHOCYTES # BLD AUTO: 3.58 K/UL — HIGH (ref 1–3.3)
LYMPHOCYTES NFR BLD AUTO: 35.4 % — SIGNIFICANT CHANGE UP (ref 13–44)
MAGNESIUM SERPL-MCNC: 2.3 MG/DL — SIGNIFICANT CHANGE UP (ref 1.6–2.6)
MCHC RBC-ENTMCNC: 28.3 PG — SIGNIFICANT CHANGE UP (ref 27–34)
MCHC RBC-ENTMCNC: 31.3 G/DL — LOW (ref 32–36)
MCV RBC AUTO: 90.5 FL — SIGNIFICANT CHANGE UP (ref 80–100)
MONOCYTES # BLD AUTO: 0.64 K/UL — SIGNIFICANT CHANGE UP (ref 0–0.9)
MONOCYTES NFR BLD AUTO: 6.3 % — SIGNIFICANT CHANGE UP (ref 2–14)
NEUTROPHILS # BLD AUTO: 5.62 K/UL — SIGNIFICANT CHANGE UP (ref 1.8–7.4)
NEUTROPHILS NFR BLD AUTO: 55.7 % — SIGNIFICANT CHANGE UP (ref 43–77)
NRBC # BLD AUTO: 0 K/UL — SIGNIFICANT CHANGE UP (ref 0–0)
NRBC # FLD: 0 K/UL — SIGNIFICANT CHANGE UP (ref 0–0)
NRBC BLD AUTO-RTO: 0 /100 WBCS — SIGNIFICANT CHANGE UP (ref 0–0)
PHOSPHATE SERPL-MCNC: 3.4 MG/DL — SIGNIFICANT CHANGE UP (ref 2.5–4.5)
PLATELET # BLD AUTO: 462 K/UL — HIGH (ref 150–400)
PMV BLD: 10.5 FL — SIGNIFICANT CHANGE UP (ref 7–13)
POTASSIUM SERPL-MCNC: 4 MMOL/L — SIGNIFICANT CHANGE UP (ref 3.5–5.3)
POTASSIUM SERPL-SCNC: 4 MMOL/L — SIGNIFICANT CHANGE UP (ref 3.5–5.3)
RBC # BLD: 3.04 M/UL — LOW (ref 4.2–5.8)
RBC # FLD: 14.1 % — SIGNIFICANT CHANGE UP (ref 10.3–14.5)
SODIUM SERPL-SCNC: 139 MMOL/L — SIGNIFICANT CHANGE UP (ref 135–145)
SPECIMEN SOURCE: SIGNIFICANT CHANGE UP
SPECIMEN SOURCE: SIGNIFICANT CHANGE UP
WBC # BLD: 10.1 K/UL — SIGNIFICANT CHANGE UP (ref 3.8–10.5)
WBC # FLD AUTO: 10.1 K/UL — SIGNIFICANT CHANGE UP (ref 3.8–10.5)

## 2025-06-19 PROCEDURE — 99232 SBSQ HOSP IP/OBS MODERATE 35: CPT

## 2025-06-19 PROCEDURE — 99239 HOSP IP/OBS DSCHRG MGMT >30: CPT

## 2025-06-19 PROCEDURE — 36573 INSJ PICC RS&I 5 YR+: CPT

## 2025-06-19 RX ORDER — INSULIN GLARGINE-YFGN 100 [IU]/ML
15 INJECTION, SOLUTION SUBCUTANEOUS
Qty: 1 | Refills: 0
Start: 2025-06-19 | End: 2025-07-18

## 2025-06-19 RX ORDER — TAMSULOSIN HYDROCHLORIDE 0.4 MG/1
1 CAPSULE ORAL
Qty: 30 | Refills: 0
Start: 2025-06-19 | End: 2025-07-18

## 2025-06-19 RX ORDER — FINASTERIDE 1 MG/1
1 TABLET, FILM COATED ORAL
Qty: 30 | Refills: 0
Start: 2025-06-19 | End: 2025-07-18

## 2025-06-19 RX ORDER — CEFAZOLIN SODIUM IN 0.9 % NACL 3 G/100 ML
2000 INTRAVENOUS SOLUTION, PIGGYBACK (ML) INTRAVENOUS ONCE
Refills: 0 | Status: COMPLETED | OUTPATIENT
Start: 2025-06-19 | End: 2025-06-19

## 2025-06-19 RX ADMIN — Medication 1 APPLICATION(S): at 12:52

## 2025-06-19 RX ADMIN — FINASTERIDE 5 MILLIGRAM(S): 1 TABLET, FILM COATED ORAL at 12:51

## 2025-06-19 RX ADMIN — Medication 10 MILLIGRAM(S): at 06:05

## 2025-06-19 RX ADMIN — INSULIN LISPRO 5 UNIT(S): 100 INJECTION, SOLUTION INTRAVENOUS; SUBCUTANEOUS at 08:59

## 2025-06-19 RX ADMIN — INSULIN LISPRO 1: 100 INJECTION, SOLUTION INTRAVENOUS; SUBCUTANEOUS at 12:51

## 2025-06-19 RX ADMIN — Medication 100 MILLIGRAM(S): at 13:35

## 2025-06-19 RX ADMIN — INSULIN LISPRO 5 UNIT(S): 100 INJECTION, SOLUTION INTRAVENOUS; SUBCUTANEOUS at 12:51

## 2025-06-19 RX ADMIN — Medication 100 MILLIGRAM(S): at 06:00

## 2025-06-19 NOTE — PROGRESS NOTE ADULT - PROBLEM SELECTOR PROBLEM 3
Uncontrolled type 2 diabetes mellitus with hyperglycemia

## 2025-06-19 NOTE — PROGRESS NOTE ADULT - PROBLEM SELECTOR PLAN 3
- Patient states he used to take metformin and an additional daily injection for his DM but stopped taking it around 2022 preferring instead to take herbal and natural supplements  - Here hyperglycemic to 685, A1C 13  -> ISAIAH, FS qAC, CC diet  -> Endocrine consulted: recs appreciated - insulin adjusted daily    Discharge on basal/bolus insulin + metformin 500 mg PO BID for 7 days then increase to 1000 mg PO BID  - pt unable to afford, team attempting to get funding for current medications and pt instructed to follow up with resident clinic for dispensary of home program.
- Patient states he used to take metformin and an additional daily injection for his DM but stopped taking it around 2022 preferring instead to take herbal and natural supplements  - Here hyperglycemic to 685, A1C 13  -> KANDI COLON qAC, CC diet  -> Endocrine consulted: ben appreciated - insulin adjusted daily    Discharge on basal/bolus insulin + metformin 500 mg PO BID for 7 days then increase to 1000 mg PO BID.
- Patient states he used to take metformin and an additional daily injection for his DM but stopped taking it around 2022 preferring instead to take herbal and natural supplements  - Here hyperglycemic to 685, A1C 13  -> ISAIAH, FS qAC, CC diet  -> Endocrine consulted: recs appreciated - insulin adjusted daily    Discharge on basal/bolus insulin + metformin 500 mg PO BID for 7 days then increase to 1000 mg PO BID  - pt unable to afford, team attempting to get funding for current medications and pt instructed to follow up with resident clinic for dispensary of home program.
- Patient states he used to take metformin and an additional daily injection for his DM but stopped taking it around 2022 preferring instead to take herbal and natural supplements  - Here hyperglycemic to 685, A1C 13  - s/p 5U agmelog and 12U lantus    -> Will c/w lantus 12U qAM, admelog 5U qAC TID  -> ISAIAH, FS qAC, CC diet  -> Endocrine consulted: recs appreciated
Yes

## 2025-06-19 NOTE — DISCHARGE NOTE NURSING/CASE MANAGEMENT/SOCIAL WORK - NSDCCRNAME_GEN_ALL_CORE_FT
[facility] ([facility]) - RN to visit today 6/19 around 7pm

## 2025-06-19 NOTE — PROGRESS NOTE ADULT - REASON FOR ADMISSION
Urinary retention, Sepsis due to UTI, Uncontrolled DM2

## 2025-06-19 NOTE — PROCEDURE NOTE - PROCEDURE FINDINGS AND DETAILS
Successful insertion of 4Fr single lumen PICC via the left brachial vein.  Tip in SVC. PICC length: 44cm  Ok to use PICC

## 2025-06-19 NOTE — PROGRESS NOTE ADULT - PROVIDER SPECIALTY LIST ADULT
Endocrinology
Endocrinology
Infectious Disease
Urology
Endocrinology
Internal Medicine
Urology
Endocrinology
Infectious Disease
Infectious Disease
Urology
Hospitalist
Urology
Endocrinology
Endocrinology
Hospitalist
Hospitalist
Infectious Disease
Hospitalist
Internal Medicine
Hospitalist

## 2025-06-19 NOTE — PROGRESS NOTE ADULT - PROBLEM SELECTOR PLAN 2
- Likely in setting of UTI, now s/p darnell catheter  - TOV started over night, darnell replaced, as failed repeat bladder scan  - consider repeat TOV after treatment of scrotal abscess.
- Likely in setting of UTI, now s/p darnell catheter    -> Would c/w darnell, TOV prior to discharge, if fails then would replace darnell and discharge with outpatient urology f/u
- Likely in setting of UTI, now s/p darnell catheter  - TOV failed x1 early in admission, pending repeat tov today
- Likely in setting of UTI, now s/p darnell catheter  - TOV started over night, darnell replaced, as failed repeat bladder scan  - consider repeat TOV after treatment of scrotal abscess.
- Likely in setting of UTI, now s/p darnell catheter  - TOV started over night, darnell replaced, as failed repeat bladder scan  - consider repeat TOV after treatment of scrotal abscess.
- Likely in setting of UTI, now s/p darnell catheter  - TOV started over night, pending results  - discussed with pt if he fails will likely go home with darnell and need to follow up with urology outpatient
- Likely in setting of UTI, now s/p darnell catheter  - TOV failed x1 early in admission, and failed TOV again on 6/17
- Likely in setting of UTI, now s/p darnell catheter  - TOV failed x1 early in admission, and failed TOV again on 6/17  - pt to go home with darnell will need urology outpatient.
- Likely in setting of UTI, now s/p darnell catheter  - TOV started over night, passed TOV
- Likely in setting of UTI, now s/p darnell catheter  - TOV today
- Likely in setting of UTI, now s/p darnell catheter  - TOV failed x1  - plan for repeat TOV tonight.

## 2025-06-19 NOTE — PROGRESS NOTE ADULT - ASSESSMENT
55M with history of DM2 and HTN (not on medications currently for either) who presents to the hospital with complaints of difficulty urinating since last night. Said that for the past 1 week he has been having urinary frequency, urgency, and dysuria. Had some hematuria initially with these symptoms but that resolved soon after it had started. Was also having chills and diaphoresis but no fevers and therefore did not seek medical attention. Said that since last night he would feel the urge to urinate but would only dribble a little and have a sensation of incomplete voiding. This continued for a while and he started to have pelvic pain and discomfort and therefore came to the hospital. Here had a darnell placed with almost immediate relief in his symptoms. Currently states he has some mild abdominal pain and had 1 episode of NBNB emesis in the ED. Also reports having chronic peripheral neuropathy x years of his feet. Otherwise denies any acute complaints. Endocrine consulted for uncontrolled diabetes, A1c 13.    Endocrine history:   DM dx: diagnosed with type 2 diabetes 5 to 6 years ago, thinks his A1c back then was 10  Medications: metformin 500 mg twice daily – stopped 2 to 3 years ago. Patient states that metformin was not working and was not doing anything for his diabetes since he was still hyperglycemic at home.  Fingersticks/CGM: Does not use CGM.  When he was checking his fingerstick, it was usually 200-300s.  He stopped checking his fingerstick a long time ago.   Diet: Patient stated that he tries to control his diabetes with diet.  He eats a lot of vegetables and makes his own juice–papaya.  He eats rice, bread.  Endocrinologist: None  Ophthalmologist evaluation: Last eye exam was 3 to 4 years ago.  Patient currently unemployed.  He has no medical insurance.  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  Patient not in DKA.     Inpatient plan   - FS goal 100-180 mg/dl: at goal   - Continue Lantus 15 units at bedtime. Do not hold if NPO.   - Continue Admelog 5 units TID AC.  Hold Admelog if not eating/NPO.  - continue low Admelog correctional scale TID AC  - continue separate low Admelog correctional scale at HS  - FS TID AC & HS ---> q6 if NPO   - hypoglycemia protocol PRN   - consistent carbohydrate diet  - RD consult  - provider to RN - insulin pen teaching    Discharge plan  - Pt expressing concern regarding insulin 4 times daily. Agreeable to once daily basal insulin on discharge.   - Patient uninsured.  - Pt is enrolled in Transitions of care program with plan to enroll in dispensary of hopr program as an outpt   - Please send Basaglar kwik pen 15 units sq qhs  - Metformin 500 mg PO BID for 7 days then increase to 1000 mg PO BID    Coupon code:  LV: [id number]  Group: [id number]  Vein: [id number]  PCN: [id number]  - please send prescription for glucometer and supplies - test strip, lancets, alcohol pads and insulin pen needles.   - Please send prescription for glucose tablets 4G (take 4 tablets) or 15G tablets for blood sugar less than 70 mG/dL, repeat fingerstick in 15 minutes. Call your provider for dose adjustment if needed.   - Please call your doctor if you fs is 70 or below and or 250 and above   - Review importance of medication adherence,  glucose monitoring, and following a consistent carb diet   - Review Hypoglycemia and intervention   - Please follow up with your pcp, podiatry, endocrinology, and opthalmology as an outpt   - Follow up at [facility]: [address] [place] [address]; Ph # [phone]: emailed for a medicine and endocrine appointment   -  will provide indigent funding     HTN  - outpatient BP goal <130/80   - continue enalapril   - Please obtain urine micro albumin cr ratio as an outpt   - management per primary team     HLD   - LDL goal <70   - Check lipid panel if not done recently   - Consider add Lipitor 10mg p.o. qhs if no contraindications   - management per primary team     CHERYL Young-BC  Nurse Practitioner  Division of Endocrinology  Contact on TEAMS    If out of hospital/unavailable when paged, please note: patient will be cared for by another provider on the endocrine service.  For urgent concerns: call the endocrine answering service for assistance to reach covering provider ([phone]). For non-urgent matters: please email [email or web address] for assistance.

## 2025-06-19 NOTE — PROGRESS NOTE ADULT - PROBLEM SELECTOR PLAN 4
- Not on medications  - BP elevated, initially 176/100 in setting of urinary retention, after placement of darnell still elevated to 161/93    -> Stared enalapril 5mg on admission  - downtrending initially however bp mildly elevated still, will increase to 10 mg on 6/11 and ctm.
- Not on medications  - BP elevated, initially 176/100 in setting of urinary retention, after placement of darnell still elevated to 161/93    -> Will start enalapril 5mg daily, uptitrate as needed  - downtrending after starting, continue to monitor
- Not on medications  - BP elevated, initially 176/100 in setting of urinary retention, after placement of darnell still elevated to 161/93    -> Stared enalapril 5mg on admission  - downtrending initially however bp mildly elevated still, will increase to 10 mg on 6/11 and ctm.
- Not on medications  - BP elevated, initially 176/100 in setting of urinary retention, after placement of darnell still elevated to 161/93    -> Stared enalapril 5mg on admission  - downtrending initially however bp mildly elevated still, will increase to 10 mg on 6/11 and ctm.
- Not on medications  - BP elevated, initially 176/100 in setting of urinary retention, after placement of darnell still elevated to 161/93    -> Will start enalapril 5mg daily, uptitrate as needed  - downtrending after starting, continue to monitor
- Not on medications  - BP elevated, initially 176/100 in setting of urinary retention, after placement of darnell still elevated to 161/93    -> Stared enalapril 5mg on admission  - downtrending initially however bp mildly elevated still, will increase to 10 mg on 6/11 and ctm.

## 2025-06-19 NOTE — PROGRESS NOTE ADULT - PROBLEM SELECTOR PLAN 1
- Meets sepsis criteria with leukocytosis and tachycardia, pt also reports chills and diaphoresis at home though no fevers  - Lactate wnl  - s/p CTX 1g in ED    -> continue ceftriaxone 1g, likely 5 days of abx  -> UCx in lab  -> BCX
# Sepsis 2/2 staph aureus UTI  # MSSA bacteremia  # Scrotal abscess  - Meets sepsis criteria with leukocytosis and tachycardia, pt also reports chills and diaphoresis at home though no fevers  - Lactate wnl  - BCX gram positive bacteremia, ucx + staph aureus  -> continued on ceftriaxone 1g (6/9-6/12)  - started on cefazolin 2g q8 once mssa resulted  - TTE without any signs of endocarditis, will hold off on reva for now  - ID consulted, recs appreciated, f/u abx recs for d/c  - CT a/p showing possible scrotal abscess, obtain u/s  - scrotal u/s showing: Mild hyperemia of the left testis and mild prominence of the left epididymis with mild hypoechogenicity. Question left epididymoorchitis.  - urology consulted, recs appreciated, started on flomax and finasteride, IR consulted, at this time abscess too small to drain, cw treatment with abx for now, wbc continues to downtrend so d/c planning  - 6/14 bcx: no growth to date 48 hours.  - Pt will need PICC line/midline placement for IV cefazolin until 7/11. PICC line pending to be placed on 6/19.
# Sepsis 2/2 staph aureus UTI  # MSSA bacteremia  # Scrotal abscess  - Meets sepsis criteria with leukocytosis and tachycardia, pt also reports chills and diaphoresis at home though no fevers  - Lactate wnl  - BCX gram positive bacteremia, ucx + staph aureus  -> continued on ceftriaxone 1g (6/9-6/12)  - started on cefazolin 2g q8 once mssa resulted  - TTE without any signs of endocarditis, will hold off on reva for now  - ID consulted, recs appreciated, obtain ct scan  - CT a/p showing possible scrotal abscess, obtain u/s  - scrotal u/s showing: Mild hyperemia of the left testis and mild prominence of the left epididymis with mild hypoechogenicity. Question left epididymoorchitis.  - urology consulted, recs appreciated, started on flomax and finasteride. Recommend IR consult for abscess drainage,
# Sepsis 2/2 staph aureus UTI  # MSSA bacteremia    - Meets sepsis criteria with leukocytosis and tachycardia, pt also reports chills and diaphoresis at home though no fevers  - Lactate wnl  - BCX gram positive bacteremia, ucx + staph aureus  -> continued on ceftriaxone 1g (6/9-6/12  - started on cefazolin 2g q8 once mssa resulted  - f/u TTE  - ID consulted, pending recs
# Sepsis 2/2 staph aureus UTI  # MSSA bacteremia  # Scrotal abscess  - Meets sepsis criteria with leukocytosis and tachycardia, pt also reports chills and diaphoresis at home though no fevers  - Lactate wnl  - BCX gram positive bacteremia, ucx + staph aureus  -> continued on ceftriaxone 1g (6/9-6/12)  - started on cefazolin 2g q8 once mssa resulted  - TTE without any signs of endocarditis, will hold off on reva for now  - ID consulted, recs appreciated, f/u abx recs for d/c  - CT a/p showing possible scrotal abscess, obtain u/s  - scrotal u/s showing: Mild hyperemia of the left testis and mild prominence of the left epididymis with mild hypoechogenicity. Question left epididymoorchitis.  - urology consulted, recs appreciated, started on flomax and finasteride, IR consulted, at this time abscess too small to drain, cw treatment with abx for now, will reconsult IR tomorrow if wbc continuing to uptrend.   - 6/14 bcx: no growth to date 48 hours.
# Sepsis 2/2 staph aureus UTI  # MSSA bacteremia  # Scrotal abscess  - Meets sepsis criteria with leukocytosis and tachycardia, pt also reports chills and diaphoresis at home though no fevers  - Lactate wnl  - BCX gram positive bacteremia, ucx + staph aureus  -> continued on ceftriaxone 1g (6/9-6/12)  - started on cefazolin 2g q8 once mssa resulted  - TTE without any signs of endocarditis, will hold off on reva for now  - ID consulted, recs appreciated, f/u abx recs for d/c  - CT a/p showing possible scrotal abscess, obtain u/s  - scrotal u/s showing: Mild hyperemia of the left testis and mild prominence of the left epididymis with mild hypoechogenicity. Question left epididymoorchitis.  - urology consulted, recs appreciated, started on flomax and finasteride, IR consulted, at this time abscess too small to drain, cw treatment with abx for now, wbc continues to downtrend so d/c planning  - 6/14 bcx: no growth to date 48 hours.  - Pt will need PICC line/midline placement for IV cefazolin until 7/11.
# Sepsis 2/2 staph aureus UTI  # MSSA bacteremia  # Scrotal abscess  - Meets sepsis criteria with leukocytosis and tachycardia, pt also reports chills and diaphoresis at home though no fevers  - Lactate wnl  - BCX gram positive bacteremia, ucx + staph aureus  -> continued on ceftriaxone 1g (6/9-6/12)  - started on cefazolin 2g q8 once mssa resulted  - TTE without any signs of endocarditis, will hold off on reva for now  - ID consulted, recs appreciated, obtain ct scan  - CT a/p showing possible scrotal abscess, obtain u/s  - scrotal u/s showing: Mild hyperemia of the left testis and mild prominence of the left epididymis with mild hypoechogenicity. Question left epididymoorchitis.  - urology consulted, recs appreciated, started on flomax and finasteride.    IR consult appreciated, say abscess too small to drain, cw treatment with abx for now, readdress with IR if clinical sxs worsen
- Meets sepsis criteria with leukocytosis and tachycardia, pt also reports chills and diaphoresis at home though no fevers  - Lactate wnl  - s/p CTX 1g in ED    -> continue ceftriaxone 1g, likely 5 days of abx  -> UCx in lab, called lab will likely take 24-48 hours from prelim result  -> BCX ngtd
# Sepsis 2/2 staph aureus UTI  # MSSA bacteremia  # Scrotal abscess  - Meets sepsis criteria with leukocytosis and tachycardia, pt also reports chills and diaphoresis at home though no fevers  - Lactate wnl  - BCX gram positive bacteremia, ucx + staph aureus  -> continued on ceftriaxone 1g (6/9-6/12)  - started on cefazolin 2g q8 once mssa resulted  - TTE without any signs of endocarditis, will hold off on reva for now  - ID consulted, recs appreciated, f/u abx recs for d/c  - CT a/p showing possible scrotal abscess, obtain u/s  - scrotal u/s showing: Mild hyperemia of the left testis and mild prominence of the left epididymis with mild hypoechogenicity. Question left epididymoorchitis.  - urology consulted, recs appreciated, started on flomax and finasteride, IR consulted, at this time abscess too small to drain, cw treatment with abx for now, wbc continues to downtrend so d/c planning  - 6/14 bcx: no growth to date 48 hours.  - Pt will need PICC line/midline placement for IV cefazolin until 7/11. PICC line pending to be placed on 6/19.
- Meets sepsis criteria with leukocytosis and tachycardia, pt also reports chills and diaphoresis at home though no fevers  - Lactate wnl  - s/p CTX 1g in ED    -> continue ceftriaxone 1g, likely 5 days of abx  -> UCx in lab  -> Check BCx x2 given patient meets sepsis criteria
# Sepsis 2/2 staph aureus UTI  # MSSA bacteremia  # Scrotal abscess  - Meets sepsis criteria with leukocytosis and tachycardia, pt also reports chills and diaphoresis at home though no fevers  - Lactate wnl  - BCX gram positive bacteremia, ucx + staph aureus  -> continued on ceftriaxone 1g (6/9-6/12)  - started on cefazolin 2g q8 once mssa resulted  - TTE without any signs of endocarditis, will hold off on tte for now  - ID consulted, recs appreciated, obtain ct scan  - CT a/p showing possible scrotal abscess, obtain u/s  - scrotal u/s showing: Mild hyperemia of the left testis and mild prominence of the left epididymis with mild hypoechogenicity. Question left epididymoorchitis.  - urology consulted, pending recs

## 2025-06-19 NOTE — PROGRESS NOTE ADULT - SUBJECTIVE AND OBJECTIVE BOX
[facility] Division of Hospital Medicine  Samuelnegritaobinna Ortiz DO  Available via MS Teams  Pager: [phone]    Patient is a 55y old  Male who presents with a chief complaint of Urinary retention, Sepsis due to UTI, Uncontrolled DM2 (18 Jun 2025 15:33)      SUBJECTIVE / OVERNIGHT EVENTS:    Pt seen and examined this morning. Pt resting comfortably in bed and denies any acute overnight events.    ADDITIONAL REVIEW OF SYSTEMS:  No Fever, chills, chest pain, shortness of breath.     MEDICATIONS  (STANDING):  chlorhexidine 2% Cloths 1 Application(s) Topical daily  dextrose 5%. 1000 milliLiter(s) (100 mL/Hr) IV Continuous <Continuous>  dextrose 5%. 1000 milliLiter(s) (50 mL/Hr) IV Continuous <Continuous>  dextrose 50% Injectable 25 Gram(s) IV Push once  dextrose 50% Injectable 12.5 Gram(s) IV Push once  dextrose 50% Injectable 25 Gram(s) IV Push once  enalapril 10 milliGRAM(s) Oral daily  finasteride 5 milliGRAM(s) Oral daily  glucagon  Injectable 1 milliGRAM(s) IntraMuscular once  insulin glargine Injectable (LANTUS) 15 Unit(s) SubCutaneous at bedtime  insulin lispro (ADMELOG) corrective regimen sliding scale   SubCutaneous three times a day before meals  insulin lispro (ADMELOG) corrective regimen sliding scale   SubCutaneous at bedtime  insulin lispro Injectable (ADMELOG) 5 Unit(s) SubCutaneous three times a day before meals  tamsulosin 0.4 milliGRAM(s) Oral at bedtime    MEDICATIONS  (PRN):  acetaminophen     Tablet .. 650 milliGRAM(s) Oral every 6 hours PRN Temp greater or equal to 38C (100.4F), Mild Pain (1 - 3)  dextrose Oral Gel 15 Gram(s) Oral once PRN Blood Glucose LESS THAN 70 milliGRAM(s)/deciliter      I&O's Summary    18 Jun 2025 07:01  -  19 Jun 2025 07:00  --------------------------------------------------------  IN: 370 mL / OUT: 1750 mL / NET: -1380 mL        PHYSICAL EXAM:  Vital Signs Last 24 Hrs  T(C): 36.3 (19 Jun 2025 05:54), Max: 36.5 (18 Jun 2025 15:28)  T(F): 97.4 (19 Jun 2025 05:54), Max: 97.7 (18 Jun 2025 15:28)  HR: 87 (19 Jun 2025 05:54) (87 - 95)  BP: 123/79 (19 Jun 2025 05:54) (106/69 - 139/90)  BP(mean): --  RR: 18 (19 Jun 2025 05:54) (17 - 18)  SpO2: 100% (19 Jun 2025 05:54) (100% - 100%)    Parameters below as of 19 Jun 2025 05:54  Patient On (Oxygen Delivery Method): room air      CONSTITUTIONAL: NAD  EYES: PERRLA  ENMT: Moist oral mucosa  RESPIRATORY: Normal respiratory effort; lungs are clear to auscultation bilaterally  CARDIOVASCULAR: Regular rate and rhythm, normal S1 and S2, no murmur/rub/gallop  ABDOMEN: Nontender to palpation, normoactive bowel sounds  PSYCH: A+O to person, place, and time  NEUROLOGY: CN 2-12 are intact and symmetric; no gross sensory deficits     LABS:                        8.6    10.10 )-----------( 462      ( 19 Jun 2025 06:07 )             27.5     06-19    139  |  103  |  21  ----------------------------<  147[H]  4.0   |  26  |  0.92    Ca    9.0      19 Jun 2025 06:07  Phos  3.4     06-19  Mg     2.30     06-19            Urinalysis Basic - ( 19 Jun 2025 06:07 )    Color: x / Appearance: x / SG: x / pH: x  Gluc: 147 mg/dL / Ketone: x  / Bili: x / Urobili: x   Blood: x / Protein: x / Nitrite: x   Leuk Esterase: x / RBC: x / WBC x   Sq Epi: x / Non Sq Epi: x / Bacteria: x

## 2025-06-19 NOTE — PROGRESS NOTE ADULT - PROBLEM SELECTOR PROBLEM 1
Sepsis due to urinary tract infection

## 2025-06-19 NOTE — PROGRESS NOTE ADULT - TIME BILLING
Reviewing the chart, interpreting lab data, discussing case with fellow, interview and examination of patient, and documentation.
review of laboratory data, radiology results, consultants' recommendations, documentation in [place], discussion with patient/ACP and interdisciplinary staff (such as , social workers, etc). Interventions were performed as documented above.
Preparing to see the patient including review of tests and other providers' notes, confirming history with patient/family member, performing medical examination and evaluation, counseling and educating the patient/family/caregiver, ordering medications, tests and procedures, communicating with other health care professionals, documenting clinical information in the EMR, independently interpreting results and communicating results to the patient/family/caregiver, care coordination
review of laboratory data, radiology results, consultants' recommendations, documentation in [place], discussion with patient/ACP and interdisciplinary staff (such as , social workers, etc). Interventions were performed as documented above.
review of laboratory data, radiology results, consultants' recommendations, documentation in [place], discussion with patient/ACP and interdisciplinary staff (such as , social workers, etc). Interventions were performed as documented above.
Preparing to see the patient including review of tests and other providers' notes, confirming history with patient/family member, performing medical examination and evaluation, counseling and educating the patient/family/caregiver, ordering medications, tests and procedures, communicating with other health care professionals, documenting clinical information in the EMR, independently interpreting results and communicating results to the patient/family/caregiver, care coordination
Preparing to see the patient including review of tests and other providers' notes, confirming history with patient/family member, performing medical examination and evaluation, counseling and educating the patient/family/caregiver, ordering medications, tests and procedures, communicating with other health care professionals, documenting clinical information in the EMR, independently interpreting results and communicating results to the patient/family/caregiver, care coordination
review of laboratory data, radiology results, consultants' recommendations, documentation in [place], discussion with patient/ACP and interdisciplinary staff (such as , social workers, etc). Interventions were performed as documented above.
Preparing to see the patient including review of tests and other providers' notes, confirming history with patient/family member, performing medical examination and evaluation, counseling and educating the patient/family/caregiver, ordering medications, tests and procedures, communicating with other health care professionals, documenting clinical information in the EMR, independently interpreting results and communicating results to the patient/family/caregiver, care coordination
Preparing to see the patient including review of tests and other providers' notes, confirming history with patient/family member, performing medical examination and evaluation, counseling and educating the patient/family/caregiver, ordering medications, tests and procedures, communicating with other health care professionals, documenting clinical information in the EMR, independently interpreting results and communicating results to the patient/family/caregiver, care coordination

## 2025-06-19 NOTE — PRE-OP CHECKLIST - HEIGHT IN CM
Attending MD Alonso: 65F with PMH/PSH including DM, HLD, HTN presents to the ED with L chest pain.  Reports started two days ago at rest, reports sharp pain in area under L breast.  Reports sudden onset, reports lasts about 5 min, resolves spontaneously.  Reports associated with shortness of breath, weakness, reports sometimes has diaphoresis with symptoms.  Denies LE edema.  Denies positional, pleuritic.  Denies rash.  Reports had stress test with Dr. García in his office about 2-3 months ago.  Denies taking anything for pain over the last two days.  Denies recent travel, sick contacts.  Denies abdominal pain, nausea, vomiting, diarrhea, urinary complaints.     Attending MD Alonso: This patient was seen and orders were placed in the waiting room as per our department's QDoc model.  Patient was to be sent to main ED for full medical evaluation and receiving team was to follow up on any labs, analgesia, clinical imaging ordered.  Any reassessment and disposition decisions were to be made by receiving team as clinically indicated, all decisions regarding the progression of care to be made at their discretion.  I did not perform a comprehensive history and physical on this patient.
170.2

## 2025-06-19 NOTE — DISCHARGE NOTE NURSING/CASE MANAGEMENT/SOCIAL WORK - NSDCFUADDAPPT_GEN_ALL_CORE_FT
APPTS ARE READY TO BE MADE: [x] YES    Best Family or Patient Contact (if needed):    Additional Information about above appointments (if needed):    1: Follow up at [facility]: [address] [place] [address];  # [phone]   2: Dr. Maia Pierre  3: ID    Other comments or requests:     Patient still admitted, was outreached but did not answer. A voicemail was left for the patient to return our call. X3

## 2025-06-19 NOTE — PROGRESS NOTE ADULT - SUBJECTIVE AND OBJECTIVE BOX
Chief Complaint: T2DM    Interval Events: Pt seen and examined at bedside earlier today.  Pt expressing concern regarding insulin 4 times daily. Agreeable to once daily basal insulin on discharge.       MEDICATIONS  (STANDING):  chlorhexidine 2% Cloths 1 Application(s) Topical daily  dextrose 5%. 1000 milliLiter(s) (100 mL/Hr) IV Continuous <Continuous>  dextrose 5%. 1000 milliLiter(s) (50 mL/Hr) IV Continuous <Continuous>  dextrose 50% Injectable 25 Gram(s) IV Push once  dextrose 50% Injectable 12.5 Gram(s) IV Push once  dextrose 50% Injectable 25 Gram(s) IV Push once  enalapril 10 milliGRAM(s) Oral daily  finasteride 5 milliGRAM(s) Oral daily  glucagon  Injectable 1 milliGRAM(s) IntraMuscular once  insulin glargine Injectable (LANTUS) 15 Unit(s) SubCutaneous at bedtime  insulin lispro (ADMELOG) corrective regimen sliding scale   SubCutaneous three times a day before meals  insulin lispro (ADMELOG) corrective regimen sliding scale   SubCutaneous at bedtime  insulin lispro Injectable (ADMELOG) 5 Unit(s) SubCutaneous three times a day before meals  tamsulosin 0.4 milliGRAM(s) Oral at bedtime    MEDICATIONS  (PRN):  acetaminophen     Tablet .. 650 milliGRAM(s) Oral every 6 hours PRN Temp greater or equal to 38C (100.4F), Mild Pain (1 - 3)  dextrose Oral Gel 15 Gram(s) Oral once PRN Blood Glucose LESS THAN 70 milliGRAM(s)/deciliter      Allergies    No Known Allergies    Intolerances      Review of Systems:  Eyes: No blurry vision  Cardiovascular: No chest pain, palpitations  Respiratory: No SOB, no cough  GI: No nausea, vomiting, abdominal pain  : No dysuria    VITALS: T(C): 36.7 (06-19-25 @ 12:26)  T(F): 98.1 (06-19-25 @ 12:26), Max: 98.6 (06-19-25 @ 11:15)  HR: 93 (06-19-25 @ 12:26) (87 - 99)  BP: 133/78 (06-19-25 @ 12:26) (106/69 - 139/90)  RR:  (16 - 18)  SpO2:  (97% - 100%)  Wt(kg): --      Physical Exam:   GENERAL: NAD, well-developed  EYES: No proptosis  HEENT:  Atraumatic, Normocephalic  RESPIRATORY: non labored breathing   GI: Non distended  PSYCH: Alert, normal affect, normal mood    CAPILLARY BLOOD GLUCOSE    POCT Blood Glucose.: 167 mg/dL (19 Jun 2025 12:45)  POCT Blood Glucose.: 119 mg/dL (19 Jun 2025 08:55)  POCT Blood Glucose.: 193 mg/dL (18 Jun 2025 22:10)  POCT Blood Glucose.: 258 mg/dL (18 Jun 2025 17:35)      06-19    139  |  103  |  21  ----------------------------<  147[H]  4.0   |  26  |  0.92    eGFR: 98    Ca    9.0      06-19  Mg     2.30     06-19  Phos  3.4     06-19        A1C with Estimated Average Glucose Result: 13.0 % (06-09-25 @ 03:29)      Thyroid Function Tests:

## 2025-06-19 NOTE — DISCHARGE NOTE NURSING/CASE MANAGEMENT/SOCIAL WORK - FINANCIAL ASSISTANCE
[facility] provides services at a reduced cost to those who are determined to be eligible through [facility]’s financial assistance program. Information regarding [facility]’s financial assistance program can be found by going to [email or web address] or by calling [phone].

## 2025-06-19 NOTE — DISCHARGE NOTE NURSING/CASE MANAGEMENT/SOCIAL WORK - PATIENT PORTAL LINK FT
You can access the FollowMyHealth Patient Portal offered by [facility] by registering at the following website: [email or web address]. By joining [facility]’s FollowMyHealth portal, you will also be able to view your health information using other applications (apps) compatible with our system.

## 2025-06-20 PROBLEM — E11.9 TYPE 2 DIABETES MELLITUS WITHOUT COMPLICATIONS: Chronic | Status: ACTIVE | Noted: 2025-06-09

## 2025-06-20 PROBLEM — I10 ESSENTIAL (PRIMARY) HYPERTENSION: Chronic | Status: ACTIVE | Noted: 2025-06-09

## 2025-06-20 LAB — GLUCOSE BLDC GLUCOMTR-MCNC: 119 MG/DL — HIGH (ref 70–99)

## 2025-06-24 ENCOUNTER — APPOINTMENT (OUTPATIENT)
Age: 55
End: 2025-06-24
Payer: MEDICAID

## 2025-06-24 PROBLEM — R33.9 URINARY RETENTION: Status: ACTIVE | Noted: 2025-06-24

## 2025-06-24 PROBLEM — I10 HYPERTENSION: Status: ACTIVE | Noted: 2025-06-24

## 2025-06-24 PROBLEM — R78.81 BACTEREMIA: Status: ACTIVE | Noted: 2025-06-24

## 2025-06-24 PROCEDURE — 99203 OFFICE O/P NEW LOW 30 MIN: CPT | Mod: 95

## 2025-06-24 NOTE — CHART NOTE - NSCHARTNOTEFT_GEN_A_CORE
Post-Discharge Medication Review: Completed	  	  Patient's preferred pharmacy was updated in [facility]: [facility] 	  	  Patient contacted to offer medication counseling post-discharge. Medication reconciliation completed. Per patient, medications include:	  	  1.	ceFAZolin 2 g intravenous injection 2 gram(s) intravenous every 8 hours Until 7/11/25  2.	enalapril 10 mg oral tablet 1 tab(s) orally once a day  3.	finasteride 5 mg oral tablet 1 tab(s) orally once a day  4.	Lantus Solostar Pen 100 units/mL subcutaneous solution 15 unit(s) subcutaneous once a day (at bedtime)  5.	metFORMIN 500 mg oral tablet 1 tab(s) orally 2 times a day Take 1 tablet twice daily for 7 days, then take 2 tablets twice daily thereafter.  6.	tamsulosin 0.4 mg oral capsule 1 cap(s) orally once a day (at bedtime)   	  	  Medication name, indication, administration, side effect, and monitoring reviewed for new medications during post discharge counseling visit with patient. Patient demonstrated understanding. Counseling offered for all medications.	  	  	  The patient was prescribed and dispensed both insulin glargine and insulin lispro at discharge. However, per endocrine discharge recommendations and instructions, the intended regimen is insulin glargine in combination with oral metformin. During post-discharge counseling, the patient shared that he believed both insulin pens were insulin glargine and was unaware that one was insulin lispro. Upon review, he recognized that they were two different insulins and confirmed that he has only been using insulin glargine. The patient was counseled to continue insulin glargine and metformin as directed. Inpatient team informed. 	  	  	  	  	  	  	  Elisabet Ames, PharmD	  Clinical Pharmacy Specialist, Pharmacy Telehealth Team	  Can be reached via [employer] Teams or [phone]

## 2025-06-24 NOTE — CHART NOTE - NSCHARTNOTESELECT_GEN_ALL_CORE
Endocrinology/Event Note
Pre-IR Note/Event Note
endocrinology/Event Note
urology/Off Service Note
Endocrine follow up/Event Note
Post-Discharge Note

## 2025-06-25 ENCOUNTER — NON-APPOINTMENT (OUTPATIENT)
Age: 55
End: 2025-06-25

## 2025-06-30 ENCOUNTER — APPOINTMENT (OUTPATIENT)
Dept: INTERNAL MEDICINE | Facility: CLINIC | Age: 55
End: 2025-06-30
Payer: MEDICAID

## 2025-06-30 ENCOUNTER — OUTPATIENT (OUTPATIENT)
Dept: OUTPATIENT SERVICES | Facility: HOSPITAL | Age: 55
LOS: 1 days | End: 2025-06-30

## 2025-06-30 VITALS
WEIGHT: 185.2 LBS | HEART RATE: 103 BPM | DIASTOLIC BLOOD PRESSURE: 80 MMHG | OXYGEN SATURATION: 95 % | HEIGHT: 66.34 IN | BODY MASS INDEX: 29.41 KG/M2 | SYSTOLIC BLOOD PRESSURE: 124 MMHG

## 2025-06-30 PROBLEM — Z87.898 HISTORY OF URINARY RETENTION: Status: RESOLVED | Noted: 2025-06-30 | Resolved: 2025-06-30

## 2025-06-30 PROCEDURE — 99396 PREV VISIT EST AGE 40-64: CPT

## 2025-06-30 RX ORDER — FINASTERIDE 5 MG/1
5 TABLET, FILM COATED ORAL
Refills: 0 | Status: ACTIVE | COMMUNITY

## 2025-06-30 RX ORDER — INSULIN GLARGINE 100 [IU]/ML
100 INJECTION, SOLUTION SUBCUTANEOUS
Refills: 0 | Status: ACTIVE | COMMUNITY

## 2025-06-30 RX ORDER — PNEUMOCOCCAL 21-VALENT CONJUGATE VACCINE 65; 4; 4; 4; 4; 4; 4; 4; 4; 4; 4; 4; 4; 4; 4; 4; 4; 4; 4; 4; 4; 4 UG/.5ML; UG/.5ML; UG/.5ML; UG/.5ML; UG/.5ML; UG/.5ML; UG/.5ML; UG/.5ML; UG/.5ML; UG/.5ML; UG/.5ML; UG/.5ML; UG/.5ML; UG/.5ML; UG/.5ML; UG/.5ML; UG/.5ML; UG/.5ML; UG/.5ML; UG/.5ML; UG/.5ML; UG/.5ML
0.5 INJECTION, SOLUTION INTRAMUSCULAR
Qty: 1 | Refills: 0 | Status: ACTIVE | COMMUNITY
Start: 2025-06-30 | End: 1900-01-01

## 2025-06-30 RX ORDER — TAMSULOSIN HYDROCHLORIDE 0.4 MG/1
0.4 CAPSULE ORAL
Refills: 0 | Status: ACTIVE | COMMUNITY

## 2025-06-30 RX ORDER — ENALAPRIL MALEATE 10 MG/1
10 TABLET ORAL
Refills: 0 | Status: ACTIVE | COMMUNITY

## 2025-06-30 RX ORDER — ROSUVASTATIN CALCIUM 20 MG/1
20 TABLET, FILM COATED ORAL DAILY
Qty: 30 | Refills: 2 | Status: ACTIVE | COMMUNITY
Start: 2025-06-30 | End: 1900-01-01

## 2025-06-30 RX ORDER — METFORMIN HYDROCHLORIDE 1000 MG/1
1000 TABLET, COATED ORAL TWICE DAILY
Qty: 60 | Refills: 0 | Status: ACTIVE | COMMUNITY

## 2025-07-01 DIAGNOSIS — I10 ESSENTIAL (PRIMARY) HYPERTENSION: ICD-10-CM

## 2025-07-01 DIAGNOSIS — Z00.00 ENCOUNTER FOR GENERAL ADULT MEDICAL EXAMINATION WITHOUT ABNORMAL FINDINGS: ICD-10-CM

## 2025-07-01 DIAGNOSIS — Z87.898 PERSONAL HISTORY OF OTHER SPECIFIED CONDITIONS: ICD-10-CM

## 2025-07-01 DIAGNOSIS — Z86.39 PERSONAL HISTORY OF OTHER ENDOCRINE, NUTRITIONAL AND METABOLIC DISEASE: ICD-10-CM

## 2025-07-03 ENCOUNTER — APPOINTMENT (OUTPATIENT)
Dept: INFECTIOUS DISEASE | Facility: CLINIC | Age: 55
End: 2025-07-03

## 2025-07-11 ENCOUNTER — NON-APPOINTMENT (OUTPATIENT)
Age: 55
End: 2025-07-11

## 2025-07-14 ENCOUNTER — APPOINTMENT (OUTPATIENT)
Dept: UROLOGY | Facility: HOSPITAL | Age: 55
End: 2025-07-14

## 2025-07-14 ENCOUNTER — OUTPATIENT (OUTPATIENT)
Dept: OUTPATIENT SERVICES | Facility: HOSPITAL | Age: 55
LOS: 1 days | End: 2025-07-14
Payer: MEDICAID

## 2025-07-14 VITALS
TEMPERATURE: 97.8 F | WEIGHT: 185 LBS | HEART RATE: 94 BPM | BODY MASS INDEX: 29.73 KG/M2 | OXYGEN SATURATION: 98 % | RESPIRATION RATE: 16 BRPM | HEIGHT: 66 IN | DIASTOLIC BLOOD PRESSURE: 96 MMHG | SYSTOLIC BLOOD PRESSURE: 142 MMHG

## 2025-07-14 DIAGNOSIS — R30.0 DYSURIA: ICD-10-CM

## 2025-07-14 DIAGNOSIS — N40.1 BENIGN PROSTATIC HYPERPLASIA WITH LOWER URINARY TRACT SYMPTOMS: ICD-10-CM

## 2025-07-14 DIAGNOSIS — N41.2 ABSCESS OF PROSTATE: ICD-10-CM

## 2025-07-14 PROBLEM — N40.0 BPH (BENIGN PROSTATIC HYPERPLASIA): Status: ACTIVE | Noted: 2025-07-14

## 2025-07-14 PROCEDURE — G0463: CPT

## 2025-07-14 RX ORDER — FINASTERIDE 5 MG/1
5 TABLET, FILM COATED ORAL DAILY
Qty: 120 | Refills: 0 | Status: DISCONTINUED | COMMUNITY
Start: 2025-07-14 | End: 2025-07-14

## 2025-07-14 RX ORDER — FINASTERIDE 5 MG/1
5 TABLET, FILM COATED ORAL DAILY
Qty: 120 | Refills: 2 | Status: ACTIVE | COMMUNITY
Start: 2025-07-14 | End: 1900-01-01

## 2025-07-14 RX ORDER — TAMSULOSIN HYDROCHLORIDE 0.4 MG/1
0.4 CAPSULE ORAL
Qty: 120 | Refills: 6 | Status: DISCONTINUED | COMMUNITY
Start: 2025-07-14 | End: 2025-07-14

## 2025-07-14 RX ORDER — TAMSULOSIN HYDROCHLORIDE 0.4 MG/1
0.4 CAPSULE ORAL
Qty: 120 | Refills: 6 | Status: ACTIVE | COMMUNITY
Start: 2025-07-14 | End: 1900-01-01

## 2025-07-16 ENCOUNTER — APPOINTMENT (OUTPATIENT)
Dept: ULTRASOUND IMAGING | Facility: HOSPITAL | Age: 55
End: 2025-07-16

## 2025-07-16 PROCEDURE — 76872 US TRANSRECTAL: CPT

## 2025-07-16 PROCEDURE — G0463: CPT

## 2025-07-16 PROCEDURE — 76872 US TRANSRECTAL: CPT | Mod: 26

## 2025-07-21 ENCOUNTER — NON-APPOINTMENT (OUTPATIENT)
Age: 55
End: 2025-07-21

## 2025-08-06 ENCOUNTER — OUTPATIENT (OUTPATIENT)
Dept: OUTPATIENT SERVICES | Facility: HOSPITAL | Age: 55
LOS: 1 days | End: 2025-08-06

## 2025-08-06 ENCOUNTER — APPOINTMENT (OUTPATIENT)
Dept: INTERNAL MEDICINE | Facility: CLINIC | Age: 55
End: 2025-08-06
Payer: MEDICAID

## 2025-08-06 VITALS
WEIGHT: 188 LBS | HEART RATE: 101 BPM | OXYGEN SATURATION: 98 % | RESPIRATION RATE: 16 BRPM | DIASTOLIC BLOOD PRESSURE: 79 MMHG | BODY MASS INDEX: 30.22 KG/M2 | SYSTOLIC BLOOD PRESSURE: 120 MMHG | TEMPERATURE: 97.6 F | HEIGHT: 66 IN

## 2025-08-06 DIAGNOSIS — E10.49 TYPE 1 DIABETES MELLITUS WITH OTHER DIABETIC NEUROLOGICAL COMPLICATION: ICD-10-CM

## 2025-08-06 DIAGNOSIS — D64.9 ANEMIA, UNSPECIFIED: ICD-10-CM

## 2025-08-06 DIAGNOSIS — Z86.39 PERSONAL HISTORY OF OTHER ENDOCRINE, NUTRITIONAL AND METABOLIC DISEASE: ICD-10-CM

## 2025-08-06 PROCEDURE — 99213 OFFICE O/P EST LOW 20 MIN: CPT | Mod: GC

## 2025-08-06 PROCEDURE — G2211 COMPLEX E/M VISIT ADD ON: CPT | Mod: NC,GC

## 2025-08-06 RX ORDER — SEMAGLUTIDE 0.68 MG/ML
2 INJECTION, SOLUTION SUBCUTANEOUS WEEKLY
Qty: 1 | Refills: 0 | Status: ACTIVE | COMMUNITY
Start: 2025-08-06 | End: 1900-01-01

## 2025-08-07 LAB
ALBUMIN SERPL ELPH-MCNC: 4 G/DL
ALBUMIN, RANDOM URINE: 4.1 MG/DL
ALP BLD-CCNC: 74 U/L
ALT SERPL-CCNC: 20 U/L
ANION GAP SERPL CALC-SCNC: 10 MMOL/L
AST SERPL-CCNC: 15 U/L
BASOPHILS # BLD AUTO: 0.05 K/UL
BASOPHILS NFR BLD AUTO: 0.8 %
BILIRUB SERPL-MCNC: 0.3 MG/DL
BUN SERPL-MCNC: 17 MG/DL
CALCIUM SERPL-MCNC: 9 MG/DL
CHLORIDE SERPL-SCNC: 96 MMOL/L
CHOLEST SERPL-MCNC: 169 MG/DL
CO2 SERPL-SCNC: 26 MMOL/L
CREAT SERPL-MCNC: 0.99 MG/DL
CREAT SPEC-SCNC: 183 MG/DL
EGFRCR SERPLBLD CKD-EPI 2021: 89 ML/MIN/1.73M2
EOSINOPHIL # BLD AUTO: 0.08 K/UL
EOSINOPHIL NFR BLD AUTO: 1.2 %
ESTIMATED AVERAGE GLUCOSE: 189 MG/DL
FERRITIN SERPL-MCNC: 44 NG/ML
FOLATE SERPL-MCNC: 18.6 NG/ML
GLUCOSE SERPL-MCNC: 324 MG/DL
HAPTOGLOB SERPL-MCNC: 128 MG/DL
HBA1C MFR BLD HPLC: 8.2 %
HCT VFR BLD CALC: 40.4 %
HDLC SERPL-MCNC: 42 MG/DL
HGB BLD-MCNC: 13.1 G/DL
IMM GRANULOCYTES NFR BLD AUTO: 0.2 %
IRON SATN MFR SERPL: 11 %
IRON SERPL-MCNC: 35 UG/DL
LDLC SERPL-MCNC: 111 MG/DL
LYMPHOCYTES # BLD AUTO: 3.19 K/UL
LYMPHOCYTES NFR BLD AUTO: 48.1 %
MAGNESIUM SERPL-MCNC: 2.2 MG/DL
MAN DIFF?: NORMAL
MCHC RBC-ENTMCNC: 27.8 PG
MCHC RBC-ENTMCNC: 32.4 G/DL
MCV RBC AUTO: 85.6 FL
MICROALBUMIN/CREAT 24H UR-RTO: 22 MG/G
MONOCYTES # BLD AUTO: 0.34 K/UL
MONOCYTES NFR BLD AUTO: 5.1 %
NEUTROPHILS # BLD AUTO: 2.96 K/UL
NEUTROPHILS NFR BLD AUTO: 44.6 %
NONHDLC SERPL-MCNC: 128 MG/DL
PHOSPHATE SERPL-MCNC: 3.3 MG/DL
PLATELET # BLD AUTO: 162 K/UL
POTASSIUM SERPL-SCNC: 4.6 MMOL/L
PROT SERPL-MCNC: 7.6 G/DL
RBC # BLD: 4.63 M/UL
RBC # BLD: 4.72 M/UL
RBC # FLD: 12.6 %
RETICS # AUTO: 1.4 %
RETICS AGGREG/RBC NFR: 64.8 K/UL
SODIUM SERPL-SCNC: 133 MMOL/L
TIBC SERPL-MCNC: 320 UG/DL
TRIGL SERPL-MCNC: 87 MG/DL
UIBC SERPL-MCNC: 285 UG/DL
VIT B12 SERPL-MCNC: 518 PG/ML
WBC # FLD AUTO: 6.63 K/UL

## 2025-08-11 ENCOUNTER — OUTPATIENT (OUTPATIENT)
Dept: OUTPATIENT SERVICES | Facility: HOSPITAL | Age: 55
LOS: 1 days | End: 2025-08-11
Payer: MEDICAID

## 2025-08-11 ENCOUNTER — APPOINTMENT (OUTPATIENT)
Dept: UROLOGY | Facility: HOSPITAL | Age: 55
End: 2025-08-11

## 2025-08-11 VITALS
RESPIRATION RATE: 16 BRPM | SYSTOLIC BLOOD PRESSURE: 117 MMHG | OXYGEN SATURATION: 100 % | WEIGHT: 188 LBS | HEART RATE: 97 BPM | DIASTOLIC BLOOD PRESSURE: 81 MMHG | HEIGHT: 66 IN | TEMPERATURE: 36.5 F | BODY MASS INDEX: 30.22 KG/M2

## 2025-08-11 DIAGNOSIS — N41.2 ABSCESS OF PROSTATE: ICD-10-CM

## 2025-08-11 DIAGNOSIS — R30.0 DYSURIA: ICD-10-CM

## 2025-08-11 LAB
APPEARANCE UR: CLEAR — SIGNIFICANT CHANGE UP
BACTERIA # UR AUTO: ABNORMAL /HPF
BILIRUB UR-MCNC: NEGATIVE — SIGNIFICANT CHANGE UP
CAST: 3 /LPF — SIGNIFICANT CHANGE UP (ref 0–4)
COLOR SPEC: YELLOW — SIGNIFICANT CHANGE UP
DIFF PNL FLD: NEGATIVE — SIGNIFICANT CHANGE UP
GLUCOSE UR QL: >=1000 MG/DL
KETONES UR QL: NEGATIVE MG/DL — SIGNIFICANT CHANGE UP
LEUKOCYTE ESTERASE UR-ACNC: NEGATIVE — SIGNIFICANT CHANGE UP
NITRITE UR-MCNC: NEGATIVE — SIGNIFICANT CHANGE UP
PH UR: 5 — SIGNIFICANT CHANGE UP (ref 5–8)
PROT UR-MCNC: NEGATIVE MG/DL — SIGNIFICANT CHANGE UP
PSA FREE FLD-MCNC: 0.11 NG/ML — SIGNIFICANT CHANGE UP
PSA FREE FLD-MCNC: 13 % — SIGNIFICANT CHANGE UP
PSA SERPL-MCNC: 0.85 NG/ML — SIGNIFICANT CHANGE UP (ref 0–4)
RBC CASTS # UR COMP ASSIST: 0 /HPF — SIGNIFICANT CHANGE UP (ref 0–4)
SP GR SPEC: 1.03 — SIGNIFICANT CHANGE UP (ref 1–1.03)
SQUAMOUS # UR AUTO: 0 /HPF — SIGNIFICANT CHANGE UP (ref 0–5)
UROBILINOGEN FLD QL: 0.2 MG/DL — SIGNIFICANT CHANGE UP (ref 0.2–1)
WBC UR QL: 1 /HPF — SIGNIFICANT CHANGE UP (ref 0–5)

## 2025-08-11 PROCEDURE — 87077 CULTURE AEROBIC IDENTIFY: CPT

## 2025-08-11 PROCEDURE — 81001 URINALYSIS AUTO W/SCOPE: CPT

## 2025-08-11 PROCEDURE — 84153 ASSAY OF PSA TOTAL: CPT

## 2025-08-11 PROCEDURE — 84154 ASSAY OF PSA FREE: CPT

## 2025-08-11 PROCEDURE — G0463: CPT

## 2025-08-11 PROCEDURE — 87086 URINE CULTURE/COLONY COUNT: CPT

## 2025-08-11 PROCEDURE — 87186 SC STD MICRODIL/AGAR DIL: CPT

## 2025-08-12 PROBLEM — E10.49 CONTROLLED TYPE 1 DIABETES MELLITUS WITH OTHER NEUROLOGIC COMPLICATION: Status: RESOLVED | Noted: 2021-06-11 | Resolved: 2025-08-12

## 2025-08-21 ENCOUNTER — OUTPATIENT (OUTPATIENT)
Dept: OUTPATIENT SERVICES | Facility: HOSPITAL | Age: 55
LOS: 1 days | End: 2025-08-21

## 2025-08-21 ENCOUNTER — APPOINTMENT (OUTPATIENT)
Dept: GASTROENTEROLOGY | Facility: CLINIC | Age: 55
End: 2025-08-21
Payer: MEDICAID

## 2025-08-21 VITALS
SYSTOLIC BLOOD PRESSURE: 127 MMHG | OXYGEN SATURATION: 95 % | WEIGHT: 185 LBS | BODY MASS INDEX: 29.03 KG/M2 | HEART RATE: 105 BPM | DIASTOLIC BLOOD PRESSURE: 81 MMHG | HEIGHT: 67 IN

## 2025-08-21 DIAGNOSIS — R15.9 FULL INCONTINENCE OF FECES: ICD-10-CM

## 2025-08-21 DIAGNOSIS — R19.7 DIARRHEA, UNSPECIFIED: ICD-10-CM

## 2025-08-21 DIAGNOSIS — Z78.9 OTHER SPECIFIED HEALTH STATUS: ICD-10-CM

## 2025-08-21 PROCEDURE — G2211 COMPLEX E/M VISIT ADD ON: CPT | Mod: NC

## 2025-08-21 PROCEDURE — 99203 OFFICE O/P NEW LOW 30 MIN: CPT

## 2025-09-11 ENCOUNTER — APPOINTMENT (OUTPATIENT)
Dept: INTERNAL MEDICINE | Facility: CLINIC | Age: 55
End: 2025-09-11
Payer: MEDICAID

## 2025-09-11 VITALS
HEIGHT: 67 IN | HEART RATE: 107 BPM | RESPIRATION RATE: 17 BRPM | DIASTOLIC BLOOD PRESSURE: 114 MMHG | OXYGEN SATURATION: 98 % | BODY MASS INDEX: 28.41 KG/M2 | SYSTOLIC BLOOD PRESSURE: 160 MMHG | WEIGHT: 181 LBS

## 2025-09-11 VITALS — DIASTOLIC BLOOD PRESSURE: 103 MMHG | SYSTOLIC BLOOD PRESSURE: 151 MMHG

## 2025-09-11 VITALS — SYSTOLIC BLOOD PRESSURE: 146 MMHG | DIASTOLIC BLOOD PRESSURE: 104 MMHG

## 2025-09-11 DIAGNOSIS — I10 ESSENTIAL (PRIMARY) HYPERTENSION: ICD-10-CM

## 2025-09-11 DIAGNOSIS — Z86.39 PERSONAL HISTORY OF OTHER ENDOCRINE, NUTRITIONAL AND METABOLIC DISEASE: ICD-10-CM

## 2025-09-11 PROCEDURE — 99214 OFFICE O/P EST MOD 30 MIN: CPT | Mod: GC
